# Patient Record
Sex: FEMALE | Race: WHITE | NOT HISPANIC OR LATINO | Employment: UNEMPLOYED | URBAN - METROPOLITAN AREA
[De-identification: names, ages, dates, MRNs, and addresses within clinical notes are randomized per-mention and may not be internally consistent; named-entity substitution may affect disease eponyms.]

---

## 2017-12-04 ENCOUNTER — HISTORICAL (OUTPATIENT)
Dept: LAB | Facility: HOSPITAL | Age: 57
End: 2017-12-04

## 2017-12-04 LAB
ABS NEUT (OLG): 3.54
ALBUMIN SERPL-MCNC: 4.3 GM/DL (ref 3.4–5)
ALBUMIN/GLOB SERPL: 1.2 RATIO (ref 1.1–2)
ALP SERPL-CCNC: 79 UNIT/L (ref 46–116)
ALT SERPL-CCNC: 231 UNIT/L (ref 12–78)
AST SERPL-CCNC: 152 UNIT/L (ref 10–37)
BASOPHILS # BLD AUTO: 0.03 X10(3)/MCL
BASOPHILS NFR BLD AUTO: 0.5 %
BILIRUB SERPL-MCNC: 1.3 MG/DL (ref 0.2–1)
BILIRUBIN DIRECT+TOT PNL SERPL-MCNC: 0.38 MG/DL (ref 0–0.2)
BILIRUBIN DIRECT+TOT PNL SERPL-MCNC: 0.9 MG/DL
BUN SERPL-MCNC: 5 MG/DL (ref 7–18)
CALCIUM SERPL-MCNC: 9.5 MG/DL (ref 8.5–10.1)
CHLORIDE SERPL-SCNC: 94 MMOL/L (ref 98–107)
CHOLEST SERPL-MCNC: 226 MG/DL (ref 50–200)
CHOLEST/HDLC SERPL: 1 {RATIO} (ref 0–5)
CO2 SERPL-SCNC: 30.1 MMOL/L (ref 21–32)
CREAT SERPL-MCNC: 0.62 MG/DL (ref 0.55–1.02)
EOSINOPHIL # BLD AUTO: 0.11 X10(3)/MCL
EOSINOPHIL NFR BLD AUTO: 1.8 %
ERYTHROCYTE [DISTWIDTH] IN BLOOD BY AUTOMATED COUNT: 12 %
GLOBULIN SER-MCNC: 3.5 GM/DL (ref 2.4–3.5)
GLUCOSE SERPL-MCNC: 91 MG/DL (ref 74–106)
HCT VFR BLD AUTO: 42.7 % (ref 34–46)
HDLC SERPL-MCNC: 174 MG/DL (ref 35–60)
HGB BLD-MCNC: 15 GM/DL (ref 11.3–15.4)
IMM GRANULOCYTES # BLD AUTO: 0.01 10*3/UL (ref 0–0.1)
IMM GRANULOCYTES NFR BLD AUTO: 0.2 % (ref 0–1)
LDLC SERPL CALC-MCNC: 41.8 MG/DL (ref 50–140)
LYMPHOCYTES # BLD AUTO: 1.59 X10(3)/MCL
LYMPHOCYTES NFR BLD AUTO: 26.1 %
MCH RBC QN AUTO: 32.1 PG (ref 27–33)
MCHC RBC AUTO-ENTMCNC: 35.1 GM/DL (ref 32–35)
MCV RBC AUTO: 91.4 FL (ref 81–97)
MONOCYTES # BLD AUTO: 0.82 X10(3)/MCL
MONOCYTES NFR BLD AUTO: 13.4 %
NEUTROPHILS # BLD AUTO: 3.54 X10(3)/MCL
NEUTROPHILS NFR BLD AUTO: 58 %
PLATELET # BLD AUTO: 252 X10(3)/MCL (ref 151–368)
PMV BLD AUTO: 8 FL
POTASSIUM SERPL-SCNC: 3 MMOL/L (ref 3.5–5.1)
PROT SERPL-MCNC: 7.8 GM/DL (ref 6.4–8.2)
RBC # BLD AUTO: 4.67 X10(6)/MCL (ref 3.9–5)
SODIUM SERPL-SCNC: 134 MMOL/L (ref 136–145)
TRIGL SERPL-MCNC: 51 MG/DL (ref 30–150)
VLDLC SERPL CALC-MCNC: 10 MG/DL
WBC # SPEC AUTO: 6.1 X10(3)/MCL (ref 3.4–9.2)

## 2017-12-18 ENCOUNTER — HISTORICAL (OUTPATIENT)
Dept: LAB | Facility: HOSPITAL | Age: 57
End: 2017-12-18

## 2017-12-18 LAB
ALBUMIN SERPL-MCNC: 4.1 GM/DL (ref 3.4–5)
ALP SERPL-CCNC: 66 UNIT/L (ref 46–116)
ALT SERPL-CCNC: 91 UNIT/L (ref 12–78)
AST SERPL-CCNC: 59 UNIT/L (ref 10–37)
BILIRUB SERPL-MCNC: 0.8 MG/DL (ref 0.2–1)
BILIRUBIN DIRECT+TOT PNL SERPL-MCNC: 0.25 MG/DL (ref 0–0.2)
BILIRUBIN DIRECT+TOT PNL SERPL-MCNC: 0.56 MG/DL
POTASSIUM SERPL-SCNC: 3.3 MMOL/L (ref 3.5–5.1)
PROT SERPL-MCNC: 7.4 GM/DL (ref 6.4–8.2)
SODIUM SERPL-SCNC: 136 MMOL/L (ref 136–145)

## 2017-12-20 ENCOUNTER — HISTORICAL (OUTPATIENT)
Dept: RADIOLOGY | Facility: HOSPITAL | Age: 57
End: 2017-12-20

## 2018-01-29 ENCOUNTER — HISTORICAL (OUTPATIENT)
Dept: RADIOLOGY | Facility: HOSPITAL | Age: 58
End: 2018-01-29

## 2018-08-20 ENCOUNTER — HISTORICAL (OUTPATIENT)
Dept: RESPIRATORY THERAPY | Facility: HOSPITAL | Age: 58
End: 2018-08-20

## 2018-08-24 ENCOUNTER — HISTORICAL (OUTPATIENT)
Dept: RADIOLOGY | Facility: HOSPITAL | Age: 58
End: 2018-08-24

## 2018-09-28 ENCOUNTER — HISTORICAL (OUTPATIENT)
Dept: RADIOLOGY | Facility: HOSPITAL | Age: 58
End: 2018-09-28

## 2018-09-28 LAB
BUN SERPL-MCNC: 6 MG/DL (ref 7–18)
CALCIUM SERPL-MCNC: 9.2 MG/DL (ref 8.5–10.1)
CHLORIDE SERPL-SCNC: 93 MMOL/L (ref 98–107)
CO2 SERPL-SCNC: 27.8 MMOL/L (ref 21–32)
CREAT SERPL-MCNC: 0.64 MG/DL (ref 0.55–1.02)
CREAT/UREA NIT SERPL: 9
GLUCOSE SERPL-MCNC: 93 MG/DL (ref 74–106)
POTASSIUM SERPL-SCNC: 3.8 MMOL/L (ref 3.5–5.1)
SODIUM SERPL-SCNC: 130 MMOL/L (ref 136–145)

## 2018-10-05 ENCOUNTER — HISTORICAL (OUTPATIENT)
Dept: RADIOLOGY | Facility: HOSPITAL | Age: 58
End: 2018-10-05

## 2019-10-02 ENCOUNTER — HISTORICAL (OUTPATIENT)
Dept: RADIOLOGY | Facility: HOSPITAL | Age: 59
End: 2019-10-02

## 2019-11-06 ENCOUNTER — HISTORICAL (OUTPATIENT)
Dept: RADIOLOGY | Facility: HOSPITAL | Age: 59
End: 2019-11-06

## 2019-11-07 ENCOUNTER — HISTORICAL (OUTPATIENT)
Dept: LAB | Facility: HOSPITAL | Age: 59
End: 2019-11-07

## 2019-11-07 LAB
ABS NEUT (OLG): 2.29
ALBUMIN SERPL-MCNC: 4 GM/DL (ref 3.4–5)
ALBUMIN/GLOB SERPL: 1.4 RATIO (ref 1.1–2)
ALP SERPL-CCNC: 65 UNIT/L (ref 46–116)
ALT SERPL-CCNC: 110 UNIT/L (ref 12–78)
AST SERPL-CCNC: 77 UNIT/L (ref 10–37)
BASOPHILS # BLD AUTO: 0.04 X10(3)/MCL
BASOPHILS NFR BLD AUTO: 0.8 %
BILIRUB SERPL-MCNC: 0.8 MG/DL (ref 0.2–1)
BILIRUBIN DIRECT+TOT PNL SERPL-MCNC: 0.27 MG/DL (ref 0–0.2)
BILIRUBIN DIRECT+TOT PNL SERPL-MCNC: 0.53 MG/DL
BUN SERPL-MCNC: 5 MG/DL (ref 7–18)
CALCIUM SERPL-MCNC: 9.4 MG/DL (ref 8.5–10.1)
CHLORIDE SERPL-SCNC: 95 MMOL/L (ref 98–107)
CHOLEST SERPL-MCNC: 193 MG/DL (ref 50–200)
CHOLEST/HDLC SERPL: 1 {RATIO} (ref 0–5)
CO2 SERPL-SCNC: 31.9 MMOL/L (ref 21–32)
CREAT SERPL-MCNC: 0.54 MG/DL (ref 0.55–1.02)
EOSINOPHIL # BLD AUTO: 0.26 X10(3)/MCL
EOSINOPHIL NFR BLD AUTO: 4.9 %
ERYTHROCYTE [DISTWIDTH] IN BLOOD BY AUTOMATED COUNT: 12 %
GLOBULIN SER-MCNC: 2.9 GM/DL (ref 2.4–3.5)
GLUCOSE SERPL-MCNC: 88 MG/DL (ref 74–106)
HCT VFR BLD AUTO: 41.7 % (ref 34–46)
HDLC SERPL-MCNC: 139 MG/DL (ref 35–60)
HGB BLD-MCNC: 14.4 GM/DL (ref 11.3–15.4)
IMM GRANULOCYTES # BLD AUTO: 0.01 10*3/UL (ref 0–0.1)
IMM GRANULOCYTES NFR BLD AUTO: 0.2 % (ref 0–1)
LDLC SERPL CALC-MCNC: 46 MG/DL (ref 50–140)
LYMPHOCYTES # BLD AUTO: 2.12 X10(3)/MCL
LYMPHOCYTES NFR BLD AUTO: 39.8 %
MCH RBC QN AUTO: 32 PG (ref 27–33)
MCHC RBC AUTO-ENTMCNC: 34.5 GM/DL (ref 32–35)
MCV RBC AUTO: 92.7 FL (ref 81–97)
MONOCYTES # BLD AUTO: 0.6 X10(3)/MCL
MONOCYTES NFR BLD AUTO: 11.3 %
NEUTROPHILS # BLD AUTO: 2.29 X10(3)/MCL
NEUTROPHILS NFR BLD AUTO: 43 %
PLATELET # BLD AUTO: 221 X10(3)/MCL (ref 140–450)
PMV BLD AUTO: 9 FL
POTASSIUM SERPL-SCNC: 3.3 MMOL/L (ref 3.5–5.1)
PROT SERPL-MCNC: 6.9 GM/DL (ref 6.4–8.2)
RBC # BLD AUTO: 4.5 X10(6)/MCL (ref 3.9–5)
SODIUM SERPL-SCNC: 135 MMOL/L (ref 136–145)
TRIGL SERPL-MCNC: 38 MG/DL (ref 30–150)
TSH SERPL-ACNC: 1.94 MIU/ML (ref 0.35–3.75)
VLDLC SERPL CALC-MCNC: 8 MG/DL
WBC # SPEC AUTO: 5.32 X10(3)/MCL (ref 3.4–9.2)

## 2020-02-12 ENCOUNTER — HISTORICAL (OUTPATIENT)
Dept: RADIOLOGY | Facility: HOSPITAL | Age: 60
End: 2020-02-12

## 2020-11-02 ENCOUNTER — HISTORICAL (OUTPATIENT)
Dept: RADIOLOGY | Facility: HOSPITAL | Age: 60
End: 2020-11-02

## 2020-11-04 ENCOUNTER — HISTORICAL (OUTPATIENT)
Dept: RADIOLOGY | Facility: HOSPITAL | Age: 60
End: 2020-11-04

## 2021-10-01 ENCOUNTER — HISTORICAL (OUTPATIENT)
Dept: LAB | Facility: HOSPITAL | Age: 61
End: 2021-10-01

## 2021-10-01 LAB
BILIRUB SERPL-MCNC: NEGATIVE MG/DL
BLOOD URINE, POC: NORMAL
CLARITY, POC UA: CLEAR
COLOR, POC UA: NORMAL
DIGOXIN SERPL-MCNC: <0.19 NG/ML (ref 0.8–2)
GLUCOSE UR QL STRIP: NEGATIVE
KETONES UR QL STRIP: NEGATIVE
LEUKOCYTE EST, POC UA: NORMAL
NITRITE, POC UA: NEGATIVE
PH, POC UA: 6
PROTEIN, POC: NEGATIVE
SPECIFIC GRAVITY, POC UA: 1.01
UROBILINOGEN, POC UA: NORMAL

## 2021-10-03 LAB — FINAL CULTURE: NO GROWTH

## 2021-10-06 ENCOUNTER — HISTORICAL (OUTPATIENT)
Dept: RADIOLOGY | Facility: HOSPITAL | Age: 61
End: 2021-10-06

## 2022-04-10 ENCOUNTER — HISTORICAL (OUTPATIENT)
Dept: ADMINISTRATIVE | Facility: HOSPITAL | Age: 62
End: 2022-04-10

## 2022-04-30 VITALS
BODY MASS INDEX: 19.54 KG/M2 | DIASTOLIC BLOOD PRESSURE: 78 MMHG | WEIGHT: 110.25 LBS | SYSTOLIC BLOOD PRESSURE: 118 MMHG | HEIGHT: 63 IN

## 2022-05-13 ENCOUNTER — LAB VISIT (OUTPATIENT)
Dept: LAB | Facility: HOSPITAL | Age: 62
End: 2022-05-13
Attending: FAMILY MEDICINE
Payer: MEDICAID

## 2022-05-13 DIAGNOSIS — Z00.00 WELLNESS EXAMINATION: ICD-10-CM

## 2022-05-13 DIAGNOSIS — Z13.6 SCREENING FOR CARDIOVASCULAR CONDITION: ICD-10-CM

## 2022-05-13 DIAGNOSIS — Z13.6 SCREENING FOR CARDIOVASCULAR CONDITION: Primary | ICD-10-CM

## 2022-05-13 LAB
ALBUMIN SERPL-MCNC: 4.2 GM/DL (ref 3.4–4.8)
ALBUMIN/GLOB SERPL: 1.5 RATIO (ref 1.1–2)
ALP SERPL-CCNC: 81 UNIT/L (ref 40–150)
ALT SERPL-CCNC: 36 UNIT/L (ref 0–55)
AST SERPL-CCNC: 45 UNIT/L (ref 5–34)
BASOPHILS # BLD AUTO: 0.04 X10(3)/MCL (ref 0–0.2)
BASOPHILS NFR BLD AUTO: 0.8 %
BILIRUBIN DIRECT+TOT PNL SERPL-MCNC: 0.4 MG/DL
BUN SERPL-MCNC: 6 MG/DL (ref 9.8–20.1)
CALCIUM SERPL-MCNC: 9.2 MG/DL (ref 8.4–10.2)
CHLORIDE SERPL-SCNC: 100 MMOL/L (ref 98–107)
CHOLEST SERPL-MCNC: 170 MG/DL
CHOLEST/HDLC SERPL: 2 {RATIO} (ref 0–5)
CO2 SERPL-SCNC: 30 MMOL/L (ref 23–31)
CREAT SERPL-MCNC: 0.62 MG/DL (ref 0.55–1.02)
EOSINOPHIL # BLD AUTO: 0.2 X10(3)/MCL (ref 0–0.9)
EOSINOPHIL NFR BLD AUTO: 3.9 %
ERYTHROCYTE [DISTWIDTH] IN BLOOD BY AUTOMATED COUNT: 12.4 % (ref 11.5–17)
GLOBULIN SER-MCNC: 2.8 GM/DL (ref 2.4–3.5)
GLUCOSE SERPL-MCNC: 94 MG/DL (ref 82–115)
HCT VFR BLD AUTO: 41.9 % (ref 37–47)
HDLC SERPL-MCNC: 104 MG/DL (ref 35–60)
HGB BLD-MCNC: 13.4 GM/DL (ref 12–16)
IMM GRANULOCYTES # BLD AUTO: 0 X10(3)/MCL (ref 0–0.02)
IMM GRANULOCYTES NFR BLD AUTO: 0 % (ref 0–0.43)
LDLC SERPL CALC-MCNC: 56 MG/DL (ref 50–140)
LYMPHOCYTES # BLD AUTO: 1.75 X10(3)/MCL (ref 0.6–4.6)
LYMPHOCYTES NFR BLD AUTO: 34.3 %
MCH RBC QN AUTO: 29.9 PG (ref 27–31)
MCHC RBC AUTO-ENTMCNC: 32 MG/DL (ref 33–36)
MCV RBC AUTO: 93.5 FL (ref 80–94)
MONOCYTES # BLD AUTO: 0.64 X10(3)/MCL (ref 0.1–1.3)
MONOCYTES NFR BLD AUTO: 12.5 %
NEUTROPHILS # BLD AUTO: 2.5 X10(3)/MCL (ref 2.1–9.2)
NEUTROPHILS NFR BLD AUTO: 48.5 %
NRBC BLD AUTO-RTO: 0 %
PLATELET # BLD AUTO: 244 X10(3)/MCL (ref 130–400)
PMV BLD AUTO: 9.3 FL (ref 9.4–12.4)
POTASSIUM SERPL-SCNC: 4.2 MMOL/L (ref 3.5–5.1)
PROT SERPL-MCNC: 7 GM/DL (ref 5.8–7.6)
RBC # BLD AUTO: 4.48 X10(6)/MCL (ref 4.2–5.4)
SODIUM SERPL-SCNC: 139 MMOL/L (ref 136–145)
TRIGL SERPL-MCNC: 51 MG/DL (ref 37–140)
VLDLC SERPL CALC-MCNC: 10 MG/DL
WBC # SPEC AUTO: 5.1 X10(3)/MCL (ref 4.5–11.5)

## 2022-05-13 PROCEDURE — 85025 COMPLETE CBC W/AUTO DIFF WBC: CPT

## 2022-05-13 PROCEDURE — 80061 LIPID PANEL: CPT

## 2022-05-13 PROCEDURE — 80053 COMPREHEN METABOLIC PANEL: CPT

## 2022-05-13 PROCEDURE — 36415 COLL VENOUS BLD VENIPUNCTURE: CPT

## 2022-05-17 ENCOUNTER — OFFICE VISIT (OUTPATIENT)
Dept: FAMILY MEDICINE | Facility: CLINIC | Age: 62
End: 2022-05-17
Payer: MEDICAID

## 2022-05-17 VITALS
BODY MASS INDEX: 19.54 KG/M2 | HEART RATE: 78 BPM | HEIGHT: 63 IN | OXYGEN SATURATION: 98 % | DIASTOLIC BLOOD PRESSURE: 78 MMHG | WEIGHT: 110.25 LBS | TEMPERATURE: 97 F | RESPIRATION RATE: 18 BRPM | SYSTOLIC BLOOD PRESSURE: 128 MMHG

## 2022-05-17 DIAGNOSIS — Z72.0 TOBACCO USER: ICD-10-CM

## 2022-05-17 DIAGNOSIS — Z00.00 WELLNESS EXAMINATION: Primary | ICD-10-CM

## 2022-05-17 PROBLEM — J44.9 CHRONIC OBSTRUCTIVE PULMONARY DISEASE: Status: ACTIVE | Noted: 2022-05-17

## 2022-05-17 PROBLEM — M81.0 OSTEOPOROSIS: Status: ACTIVE | Noted: 2022-05-17

## 2022-05-17 PROBLEM — I10 HYPERTENSION: Status: ACTIVE | Noted: 2022-05-17

## 2022-05-17 PROBLEM — K21.9 GASTROESOPHAGEAL REFLUX DISEASE: Status: ACTIVE | Noted: 2022-05-17

## 2022-05-17 PROBLEM — F41.9 ANXIETY: Status: ACTIVE | Noted: 2022-05-17

## 2022-05-17 PROBLEM — E78.5 HYPERLIPIDEMIA: Status: ACTIVE | Noted: 2022-05-17

## 2022-05-17 PROCEDURE — 1160F RVW MEDS BY RX/DR IN RCRD: CPT | Mod: CPTII,,, | Performed by: FAMILY MEDICINE

## 2022-05-17 PROCEDURE — 1159F MED LIST DOCD IN RCRD: CPT | Mod: CPTII,,, | Performed by: FAMILY MEDICINE

## 2022-05-17 PROCEDURE — 3008F BODY MASS INDEX DOCD: CPT | Mod: CPTII,,, | Performed by: FAMILY MEDICINE

## 2022-05-17 PROCEDURE — 3074F SYST BP LT 130 MM HG: CPT | Mod: CPTII,,, | Performed by: FAMILY MEDICINE

## 2022-05-17 PROCEDURE — 3078F DIAST BP <80 MM HG: CPT | Mod: CPTII,,, | Performed by: FAMILY MEDICINE

## 2022-05-17 PROCEDURE — 99396 PR PREVENTIVE VISIT,EST,40-64: ICD-10-PCS | Mod: ,,, | Performed by: FAMILY MEDICINE

## 2022-05-17 PROCEDURE — 1159F PR MEDICATION LIST DOCUMENTED IN MEDICAL RECORD: ICD-10-PCS | Mod: CPTII,,, | Performed by: FAMILY MEDICINE

## 2022-05-17 PROCEDURE — 4010F ACE/ARB THERAPY RXD/TAKEN: CPT | Mod: CPTII,,, | Performed by: FAMILY MEDICINE

## 2022-05-17 PROCEDURE — 3008F PR BODY MASS INDEX (BMI) DOCUMENTED: ICD-10-PCS | Mod: CPTII,,, | Performed by: FAMILY MEDICINE

## 2022-05-17 PROCEDURE — 99396 PREV VISIT EST AGE 40-64: CPT | Mod: ,,, | Performed by: FAMILY MEDICINE

## 2022-05-17 PROCEDURE — 4010F PR ACE/ARB THEARPY RXD/TAKEN: ICD-10-PCS | Mod: CPTII,,, | Performed by: FAMILY MEDICINE

## 2022-05-17 PROCEDURE — 3074F PR MOST RECENT SYSTOLIC BLOOD PRESSURE < 130 MM HG: ICD-10-PCS | Mod: CPTII,,, | Performed by: FAMILY MEDICINE

## 2022-05-17 PROCEDURE — 3078F PR MOST RECENT DIASTOLIC BLOOD PRESSURE < 80 MM HG: ICD-10-PCS | Mod: CPTII,,, | Performed by: FAMILY MEDICINE

## 2022-05-17 PROCEDURE — 1160F PR REVIEW ALL MEDS BY PRESCRIBER/CLIN PHARMACIST DOCUMENTED: ICD-10-PCS | Mod: CPTII,,, | Performed by: FAMILY MEDICINE

## 2022-05-17 RX ORDER — TRAZODONE HYDROCHLORIDE 50 MG/1
TABLET ORAL
COMMUNITY
Start: 2022-01-10 | End: 2022-09-06 | Stop reason: ALTCHOICE

## 2022-05-17 RX ORDER — ASPIRIN 81 MG/1
81 TABLET ORAL EVERY MORNING
Status: ON HOLD | COMMUNITY
End: 2024-01-25 | Stop reason: HOSPADM

## 2022-05-17 RX ORDER — ENALAPRIL MALEATE 5 MG/1
2.5 TABLET ORAL
COMMUNITY
Start: 2021-09-22 | End: 2022-07-28

## 2022-05-17 RX ORDER — HYDROCHLOROTHIAZIDE 25 MG/1
25 TABLET ORAL
Status: ON HOLD | COMMUNITY
End: 2023-07-16 | Stop reason: HOSPADM

## 2022-05-17 RX ORDER — FLUTICASONE PROPIONATE AND SALMETEROL 250; 50 UG/1; UG/1
1 POWDER RESPIRATORY (INHALATION)
Status: ON HOLD | COMMUNITY
End: 2023-07-14

## 2022-05-17 RX ORDER — DIGOXIN 125 MCG
TABLET ORAL
COMMUNITY
Start: 2021-12-29 | End: 2023-06-08

## 2022-05-17 RX ORDER — SIMVASTATIN 20 MG/1
TABLET, FILM COATED ORAL
COMMUNITY
Start: 2021-10-21 | End: 2022-07-13

## 2022-05-17 RX ORDER — IPRATROPIUM BROMIDE AND ALBUTEROL SULFATE 2.5; .5 MG/3ML; MG/3ML
3 SOLUTION RESPIRATORY (INHALATION) EVERY 6 HOURS PRN
COMMUNITY
Start: 2021-11-15 | End: 2023-08-31 | Stop reason: SDUPTHER

## 2022-05-17 RX ORDER — POTASSIUM CHLORIDE 750 MG/1
TABLET, EXTENDED RELEASE ORAL
COMMUNITY
Start: 2021-12-10 | End: 2022-08-12

## 2022-05-17 NOTE — PROGRESS NOTES
"Subjective:       Patient ID: Antonina Jaimes is a 61 y.o. female.    Chief Complaint: wellness      Wellness    Hypertension  - tolerating medication  - no headaches  - patient without any complaints    Hyperlipidemia  - tolerating medication  - no myalgia  - watching diet    Review of Systems   Constitutional: Negative.    HENT: Negative.    Respiratory: Negative.    Cardiovascular: Negative.    Gastrointestinal: Negative.    Genitourinary: Negative.    Musculoskeletal: Negative.    Integumentary:  Negative.   Neurological: Negative.    Hematological: Negative.    Psychiatric/Behavioral: Negative.            Objective:      /78 (BP Location: Left arm, Patient Position: Sitting, BP Method: Large (Manual))   Pulse 78   Temp 97.2 °F (36.2 °C)   Resp 18   Ht 5' 3" (1.6 m)   Wt 50 kg (110 lb 3.7 oz)   SpO2 98%   BMI 19.53 kg/m²    Physical Exam  Constitutional:       General: She is not in acute distress.     Appearance: Normal appearance.   Cardiovascular:      Rate and Rhythm: Normal rate and regular rhythm.   Pulmonary:      Effort: Pulmonary effort is normal.      Breath sounds: Normal breath sounds.   Abdominal:      General: Abdomen is flat. Bowel sounds are normal.      Palpations: Abdomen is soft.   Musculoskeletal:         General: Normal range of motion.   Neurological:      General: No focal deficit present.      Mental Status: She is alert and oriented to person, place, and time.   Psychiatric:         Mood and Affect: Mood normal.         Behavior: Behavior normal.         Thought Content: Thought content normal.         Judgment: Judgment normal.           Recent Results (from the past 504 hour(s))   Comprehensive Metabolic Panel    Collection Time: 05/13/22  9:58 AM   Result Value Ref Range    Sodium Level 139 136 - 145 mmol/L    Potassium Level 4.2 3.5 - 5.1 mmol/L    Chloride 100 98 - 107 mmol/L    Carbon Dioxide 30 23 - 31 mmol/L    Glucose Level 94 82 - 115 mg/dL    Blood Urea Nitrogen " 6.0 (L) 9.8 - 20.1 mg/dL    Creatinine 0.62 0.55 - 1.02 mg/dL    Calcium Level Total 9.2 8.4 - 10.2 mg/dL    Protein Total 7.0 5.8 - 7.6 gm/dL    Albumin Level 4.2 3.4 - 4.8 gm/dL    Globulin 2.8 2.4 - 3.5 gm/dL    Albumin/Globulin Ratio 1.5 1.1 - 2.0 ratio    Bilirubin Total 0.4 <=1.5 mg/dL    Alkaline Phosphatase 81 40 - 150 unit/L    Alanine Aminotransferase 36 0 - 55 unit/L    Aspartate Aminotransferase 45 (H) 5 - 34 unit/L   Lipid Panel    Collection Time: 05/13/22  9:58 AM   Result Value Ref Range    Cholesterol Total 170 <=200 mg/dL    HDL Cholesterol 104 (H) 35 - 60 mg/dL    Triglyceride 51 37 - 140 mg/dL    Cholesterol/HDL Ratio 2 0 - 5    Very Low Density Lipoprotein 10     LDL Cholesterol 56.00 50.00 - 140.00 mg/dL   CBC with Differential    Collection Time: 05/13/22  9:58 AM   Result Value Ref Range    WBC 5.1 4.5 - 11.5 x10(3)/mcL    RBC 4.48 4.20 - 5.40 x10(6)/mcL    Hgb 13.4 12.0 - 16.0 gm/dL    Hct 41.9 37.0 - 47.0 %    MCV 93.5 80.0 - 94.0 fL    MCH 29.9 27.0 - 31.0 pg    MCHC 32.0 (L) 33.0 - 36.0 mg/dL    RDW 12.4 11.5 - 17.0 %    Platelet 244 130 - 400 x10(3)/mcL    MPV 9.3 (L) 9.4 - 12.4 fL    Neut % 48.5 %    Lymph % 34.3 %    Mono Auto 12.5 %    Eos Auto 3.9 %    Basophil Auto 0.8 %    Lymph # 1.75 0.6 - 4.6 x10(3)/mcL    Neut # 2.5 2.1 - 9.2 x10(3)/mcL    Abs Mono 0.64 0.1 - 1.3 x10(3)/mcL    Abs Eos 0.20 0 - 0.9 x10(3)/mcL    Abs Baso 0.04 0 - 0.2 x10(3)/mcL    IG# 0.00 0 - 0.0155 x10(3)/mcL    IG% 0.0 0 - 0.43 %    NRBC% 0.0 %       Assessment:       Problem List Items Addressed This Visit        Other    Tobacco user    Relevant Orders    Ambulatory referral/consult to Smoking Cessation Program      Other Visit Diagnoses     Wellness examination    -  Primary           Plan:     1. Wellness  Lab work discussed with patient  Continue current medication  Continue diet/exercise  Patient defers mammogram/colon cancer screening  Refer for tobacco cessation program  Return to clinic with any  concerns

## 2022-09-06 ENCOUNTER — HOSPITAL ENCOUNTER (EMERGENCY)
Facility: HOSPITAL | Age: 62
Discharge: HOME | End: 2022-09-06
Attending: EMERGENCY MEDICINE
Payer: MEDICAID

## 2022-09-06 VITALS
DIASTOLIC BLOOD PRESSURE: 91 MMHG | BODY MASS INDEX: 19.84 KG/M2 | HEART RATE: 92 BPM | HEIGHT: 63 IN | WEIGHT: 112 LBS | OXYGEN SATURATION: 97 % | RESPIRATION RATE: 16 BRPM | SYSTOLIC BLOOD PRESSURE: 169 MMHG | TEMPERATURE: 99 F

## 2022-09-06 DIAGNOSIS — M54.9 BACK PAIN: ICD-10-CM

## 2022-09-06 DIAGNOSIS — R07.9 CHEST PAIN: Primary | ICD-10-CM

## 2022-09-06 DIAGNOSIS — S22.000A COMPRESSION FRACTURE OF THORACIC VERTEBRA, UNSPECIFIED THORACIC VERTEBRAL LEVEL, INITIAL ENCOUNTER: ICD-10-CM

## 2022-09-06 DIAGNOSIS — G89.29 CHRONIC RIGHT-SIDED THORACIC BACK PAIN: ICD-10-CM

## 2022-09-06 DIAGNOSIS — M54.6 CHRONIC RIGHT-SIDED THORACIC BACK PAIN: ICD-10-CM

## 2022-09-06 DIAGNOSIS — R07.89 RIGHT-SIDED CHEST WALL PAIN: ICD-10-CM

## 2022-09-06 PROCEDURE — 99284 EMERGENCY DEPT VISIT MOD MDM: CPT | Mod: 25

## 2022-09-06 PROCEDURE — 96372 THER/PROPH/DIAG INJ SC/IM: CPT | Performed by: EMERGENCY MEDICINE

## 2022-09-06 PROCEDURE — 63600175 PHARM REV CODE 636 W HCPCS: Performed by: EMERGENCY MEDICINE

## 2022-09-06 RX ORDER — HYDROCODONE BITARTRATE AND ACETAMINOPHEN 5; 325 MG/1; MG/1
1 TABLET ORAL EVERY 6 HOURS PRN
Qty: 15 TABLET | Refills: 0 | Status: SHIPPED | OUTPATIENT
Start: 2022-09-06 | End: 2022-09-19

## 2022-09-06 RX ORDER — CYCLOBENZAPRINE HCL 10 MG
10 TABLET ORAL 3 TIMES DAILY PRN
Qty: 15 TABLET | Refills: 0 | Status: SHIPPED | OUTPATIENT
Start: 2022-09-06 | End: 2022-09-11

## 2022-09-06 RX ORDER — KETOROLAC TROMETHAMINE 30 MG/ML
30 INJECTION, SOLUTION INTRAMUSCULAR; INTRAVENOUS
Status: COMPLETED | OUTPATIENT
Start: 2022-09-06 | End: 2022-09-06

## 2022-09-06 RX ADMIN — KETOROLAC TROMETHAMINE 30 MG: 30 INJECTION, SOLUTION INTRAMUSCULAR; INTRAVENOUS at 04:09

## 2022-09-06 NOTE — ED PROVIDER NOTES
Encounter Date: 9/6/2022       History     Chief Complaint   Patient presents with    Back Pain    Rib Injury     Rib injury on left weeks ago with pain increasing and moving around middle back and bilateral lower ribs     Patient is a 61-year-old female presenting with complain chest wall pain to the right lateral chest area and to the thoracic back area on the right.  Patient denies any trauma or falls.  Patient does states she has a chronic cough from smoking.  Patient denies fever or chills.  No purulent sputum.  Patient denies shortness of breath.  She complains of increased pain with movement of her torso.  Patient states she had something similar a couple months ago on the the left lateral chest wall.  Patient also states she has had episodes of thoracic back pain in the past.  Patient denies any focal weakness of the extremities.  No paresthesias.    Review of patient's allergies indicates:  No Known Allergies  Past Medical History:   Diagnosis Date    COPD (chronic obstructive pulmonary disease)     GERD (gastroesophageal reflux disease)     Hyperlipidemia     Hypertension     Osteoporosis      Past Surgical History:   Procedure Laterality Date    ADENOIDECTOMY      HYSTERECTOMY      TONSILLECTOMY       History reviewed. No pertinent family history.  Social History     Tobacco Use    Smoking status: Every Day     Packs/day: 1.00     Types: Cigarettes    Smokeless tobacco: Never   Substance Use Topics    Alcohol use: Yes     Alcohol/week: 2.0 standard drinks     Types: 2 Cans of beer per week     Review of Systems   Constitutional: Negative.    Respiratory:  Positive for cough. Negative for shortness of breath and wheezing.    Cardiovascular:  Positive for chest pain. Negative for palpitations and leg swelling.   Gastrointestinal: Negative.    Genitourinary: Negative.    Musculoskeletal:  Positive for back pain and myalgias. Negative for gait problem.   Neurological: Negative.    Psychiatric/Behavioral:  Negative.       Physical Exam     Initial Vitals [09/06/22 1557]   BP Pulse Resp Temp SpO2   (!) 173/100 96 20 98.6 °F (37 °C) 97 %      MAP       --         Physical Exam    Nursing note and vitals reviewed.  Constitutional: She appears well-developed and well-nourished.   HENT:   Head: Normocephalic and atraumatic.   Neck: Neck supple.   Normal range of motion.  Cardiovascular:  Normal rate, regular rhythm and normal heart sounds.           Pulmonary/Chest: Breath sounds normal. No respiratory distress. She has no wheezes. She has no rhonchi. She has no rales. She exhibits tenderness.   Patient has reproducible chest tenderness to palpation to the right lateral chest wall and to the right thoracic back area.  No rash seen.  Patient has good bilateral breath sounds.   Abdominal: Abdomen is soft. There is no abdominal tenderness. There is no guarding.   Musculoskeletal:         General: Normal range of motion.      Cervical back: Normal range of motion and neck supple.      Comments: No tenderness to palpation over the thoracic spine.  No deformities or step-offs.  No rashes.     Neurological: She is alert and oriented to person, place, and time. She has normal strength.   Psychiatric: She has a normal mood and affect. Thought content normal.       ED Course   Procedures  Labs Reviewed - No data to display       Imaging Results              X-Ray Thoracic Spine AP And Lateral (Final result)  Result time 09/06/22 16:47:50      Final result by Sabrina Cuevas MD (09/06/22 16:47:50)                   Impression:      1. Multiple age-indeterminate compression deformities with mild loss of height.  2. Multilevel degenerative changes of the thoracic spine.      Electronically signed by: Sabrina Cuevas  Date:    09/06/2022  Time:    16:47               Narrative:    EXAMINATION:  XR THORACIC SPINE AP LATERAL    CLINICAL HISTORY:  Dorsalgia, unspecified    COMPARISON:  Chest x-ray dated  12/19/2019    FINDINGS:  Thoracic alignment is preserved.  There are multiple age indeterminate compression deformities in the thoracic spine with mild loss of height.  There are mild multilevel degenerative change with disc height loss and marginal osteophyte formation.  The soft tissues are unremarkable.                                       X-Ray Chest PA And Lateral (Final result)  Result time 09/06/22 16:47:46      Final result by Tyree Santillan MD (09/06/22 16:47:46)                   Impression:      No acute findings.      Electronically signed by: Tyree Santillan  Date:    09/06/2022  Time:    16:47               Narrative:    EXAMINATION:  XR CHEST PA AND LATERAL    CLINICAL HISTORY:  Chest pain, unspecified    COMPARISON:  6 November 2019    FINDINGS:  PA and lateral views of the chest were obtained. Heart is not significantly enlarged.  There is a 9 mm nodule medial right lung base which appears stable going back to 2019.  No new focal consolidation.  No pneumothorax.                                       Medications   ketorolac injection 30 mg (30 mg Intramuscular Given 9/6/22 1625)                          Clinical Impression:   Final diagnoses:  [R07.9] Chest pain (Primary)  [M54.9] Back pain  [R07.89] Right-sided chest wall pain  [M54.6, G89.29] Chronic right-sided thoracic back pain  [S22.000A] Compression fracture of thoracic vertebra, unspecified thoracic vertebral level, initial encounter      ED Disposition Condition    Discharge Stable          ED Prescriptions       Medication Sig Dispense Start Date End Date Auth. Provider    HYDROcodone-acetaminophen (NORCO) 5-325 mg per tablet Take 1 tablet by mouth every 6 (six) hours as needed for Pain. 15 tablet 9/6/2022 -- Williams Díaz MD    cyclobenzaprine (FLEXERIL) 10 MG tablet Take 1 tablet (10 mg total) by mouth 3 (three) times daily as needed for Muscle spasms. 15 tablet 9/6/2022 9/11/2022 Williams Díaz MD          Follow-up Information     None          Williams Díaz MD  09/06/22 9160

## 2022-09-19 ENCOUNTER — OFFICE VISIT (OUTPATIENT)
Dept: FAMILY MEDICINE | Facility: CLINIC | Age: 62
End: 2022-09-19
Payer: MEDICAID

## 2022-09-19 VITALS
DIASTOLIC BLOOD PRESSURE: 74 MMHG | HEART RATE: 97 BPM | OXYGEN SATURATION: 97 % | BODY MASS INDEX: 19.67 KG/M2 | HEIGHT: 63 IN | TEMPERATURE: 97 F | RESPIRATION RATE: 18 BRPM | SYSTOLIC BLOOD PRESSURE: 110 MMHG | WEIGHT: 111 LBS

## 2022-09-19 DIAGNOSIS — S22.000A COMPRESSION FRACTURE OF THORACIC VERTEBRA, UNSPECIFIED THORACIC VERTEBRAL LEVEL, INITIAL ENCOUNTER: Primary | ICD-10-CM

## 2022-09-19 PROCEDURE — 4010F PR ACE/ARB THEARPY RXD/TAKEN: ICD-10-PCS | Mod: CPTII,,, | Performed by: FAMILY MEDICINE

## 2022-09-19 PROCEDURE — 3008F PR BODY MASS INDEX (BMI) DOCUMENTED: ICD-10-PCS | Mod: CPTII,,, | Performed by: FAMILY MEDICINE

## 2022-09-19 PROCEDURE — 99214 PR OFFICE/OUTPT VISIT, EST, LEVL IV, 30-39 MIN: ICD-10-PCS | Mod: ,,, | Performed by: FAMILY MEDICINE

## 2022-09-19 PROCEDURE — 3074F SYST BP LT 130 MM HG: CPT | Mod: CPTII,,, | Performed by: FAMILY MEDICINE

## 2022-09-19 PROCEDURE — 1160F PR REVIEW ALL MEDS BY PRESCRIBER/CLIN PHARMACIST DOCUMENTED: ICD-10-PCS | Mod: CPTII,,, | Performed by: FAMILY MEDICINE

## 2022-09-19 PROCEDURE — 3074F PR MOST RECENT SYSTOLIC BLOOD PRESSURE < 130 MM HG: ICD-10-PCS | Mod: CPTII,,, | Performed by: FAMILY MEDICINE

## 2022-09-19 PROCEDURE — 99214 OFFICE O/P EST MOD 30 MIN: CPT | Mod: ,,, | Performed by: FAMILY MEDICINE

## 2022-09-19 PROCEDURE — 1160F RVW MEDS BY RX/DR IN RCRD: CPT | Mod: CPTII,,, | Performed by: FAMILY MEDICINE

## 2022-09-19 PROCEDURE — 3078F PR MOST RECENT DIASTOLIC BLOOD PRESSURE < 80 MM HG: ICD-10-PCS | Mod: CPTII,,, | Performed by: FAMILY MEDICINE

## 2022-09-19 PROCEDURE — 3008F BODY MASS INDEX DOCD: CPT | Mod: CPTII,,, | Performed by: FAMILY MEDICINE

## 2022-09-19 PROCEDURE — 3078F DIAST BP <80 MM HG: CPT | Mod: CPTII,,, | Performed by: FAMILY MEDICINE

## 2022-09-19 PROCEDURE — 1159F PR MEDICATION LIST DOCUMENTED IN MEDICAL RECORD: ICD-10-PCS | Mod: CPTII,,, | Performed by: FAMILY MEDICINE

## 2022-09-19 PROCEDURE — 4010F ACE/ARB THERAPY RXD/TAKEN: CPT | Mod: CPTII,,, | Performed by: FAMILY MEDICINE

## 2022-09-19 PROCEDURE — 1159F MED LIST DOCD IN RCRD: CPT | Mod: CPTII,,, | Performed by: FAMILY MEDICINE

## 2022-09-19 RX ORDER — NAPROXEN 500 MG/1
500 TABLET ORAL 2 TIMES DAILY
Qty: 30 TABLET | Refills: 0 | Status: SHIPPED | OUTPATIENT
Start: 2022-09-19 | End: 2022-10-06

## 2022-09-19 RX ORDER — CYCLOBENZAPRINE HCL 5 MG
5 TABLET ORAL 3 TIMES DAILY PRN
Qty: 30 TABLET | Refills: 0 | Status: SHIPPED | OUTPATIENT
Start: 2022-09-19 | End: 2022-09-29

## 2022-09-19 NOTE — PROGRESS NOTES
"Subjective:       Patient ID: Antonina Jaimes is a 61 y.o. female.    Chief Complaint: 4 month follow up      Back pain      Review of Systems   Constitutional: Negative.    Respiratory: Negative.     Cardiovascular: Negative.    Musculoskeletal:         Thoracic back pain: seen in ER on 9/8/2022, no recent trauma, unable to lay flat due to pain         Objective:      /74 (BP Location: Left arm, Patient Position: Sitting, BP Method: Large (Manual))   Pulse 97   Temp 97.4 °F (36.3 °C)   Resp 18   Ht 5' 3" (1.6 m)   Wt 50.3 kg (111 lb)   SpO2 97%   BMI 19.66 kg/m²    Physical Exam  Constitutional:       Appearance: Normal appearance.   Cardiovascular:      Rate and Rhythm: Normal rate and regular rhythm.      Heart sounds: Normal heart sounds.   Pulmonary:      Effort: Pulmonary effort is normal.      Breath sounds: Normal breath sounds.   Musculoskeletal:      Comments: TTP on midline thoracic spine   Neurological:      Mental Status: She is alert.   Psychiatric:         Mood and Affect: Mood normal.         Behavior: Behavior normal.         Thought Content: Thought content normal.         Judgment: Judgment normal.           Assessment:       Problem List Items Addressed This Visit    None  Visit Diagnoses       Compression fracture of thoracic vertebra, unspecified thoracic vertebral level, initial encounter    -  Primary    Relevant Medications    cyclobenzaprine (FLEXERIL) 5 MG tablet    naproxen (NAPROSYN) 500 MG tablet    Other Relevant Orders    Ambulatory referral/consult to Neurosurgery               Plan:   1. Compression fracture of thoracic vertebra, unspecified thoracic vertebral level, initial encounter  -     Ambulatory referral/consult to Neurosurgery  -     cyclobenzaprine (FLEXERIL) 5 MG tablet  -     naproxen (NAPROSYN) 500 MG tablet  X-ray results reviewed patient  Use above medication   Monitor  Return to clinic with any concerns      "

## 2022-09-21 ENCOUNTER — HISTORICAL (OUTPATIENT)
Dept: ADMINISTRATIVE | Facility: HOSPITAL | Age: 62
End: 2022-09-21
Payer: MEDICAID

## 2023-02-27 ENCOUNTER — HOSPITAL ENCOUNTER (EMERGENCY)
Facility: HOSPITAL | Age: 63
Discharge: HOME | End: 2023-02-27
Attending: STUDENT IN AN ORGANIZED HEALTH CARE EDUCATION/TRAINING PROGRAM
Payer: MEDICAID

## 2023-02-27 VITALS
TEMPERATURE: 98 F | DIASTOLIC BLOOD PRESSURE: 79 MMHG | RESPIRATION RATE: 18 BRPM | OXYGEN SATURATION: 99 % | HEART RATE: 78 BPM | SYSTOLIC BLOOD PRESSURE: 140 MMHG

## 2023-02-27 DIAGNOSIS — S93.402A SPRAIN OF LEFT ANKLE, UNSPECIFIED LIGAMENT, INITIAL ENCOUNTER: Primary | ICD-10-CM
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
## 2023-02-27 DIAGNOSIS — S83.92XA SPRAIN OF LEFT KNEE, UNSPECIFIED LIGAMENT, INITIAL ENCOUNTER: ICD-10-CM

## 2023-02-27 PROCEDURE — 99284 EMERGENCY DEPT VISIT MOD MDM: CPT | Mod: 25

## 2023-02-27 PROCEDURE — 96372 THER/PROPH/DIAG INJ SC/IM: CPT | Performed by: STUDENT IN AN ORGANIZED HEALTH CARE EDUCATION/TRAINING PROGRAM

## 2023-02-27 PROCEDURE — 63600175 PHARM REV CODE 636 W HCPCS: Performed by: STUDENT IN AN ORGANIZED HEALTH CARE EDUCATION/TRAINING PROGRAM

## 2023-02-27 RX ORDER — KETOROLAC TROMETHAMINE 30 MG/ML
30 INJECTION, SOLUTION INTRAMUSCULAR; INTRAVENOUS
Status: COMPLETED | OUTPATIENT
Start: 2023-02-27 | End: 2023-02-27

## 2023-02-27 RX ADMIN — KETOROLAC TROMETHAMINE 30 MG: 30 INJECTION, SOLUTION INTRAMUSCULAR; INTRAVENOUS at 09:02

## 2023-02-28 NOTE — ED NOTES
Wrapped left ankle and left knee, +cms before and after. Checked by Dr Sorto. Fit for crutches, crutch teaching and return demo. Pt high risk for falls. Explained to daughter and she agrees. Will assist pt at home.

## 2023-02-28 NOTE — ED PROVIDER NOTES
Encounter Date: 2/27/2023       History     Chief Complaint   Patient presents with    Fall    Ankle Pain    Knee Injury     Slipped and fell on floor ;twisted left ankle and fell on right knee. Right ankle swollen with abrasion, right knee not swllen; has small abrasion . No bleeding     63yo WF past medical history COPD, Hypertension, hyperlipidemia presented to ED after slip and fall. Patient states slipped on wet floor today around 2 pm. States twisted her left ankle and fell to the ground hitting her knee. She denies hitting her head, loss of consciousness.    Review of patient's allergies indicates:  Not on File  Past Medical History:   Diagnosis Date    COPD (chronic obstructive pulmonary disease)     GERD (gastroesophageal reflux disease)     Hyperlipidemia     Hypertension     Osteoporosis      Past Surgical History:   Procedure Laterality Date    ADENOIDECTOMY      HYSTERECTOMY      TONSILLECTOMY       No family history on file.  Social History     Tobacco Use    Smoking status: Every Day     Packs/day: 1.00     Types: Cigarettes    Smokeless tobacco: Never   Substance Use Topics    Alcohol use: Yes     Alcohol/week: 2.0 standard drinks     Types: 2 Cans of beer per week     Review of Systems   Constitutional:  Negative for chills and fever.   HENT:  Negative for congestion.    Eyes: Negative.    Respiratory:  Negative for shortness of breath.    Cardiovascular:  Negative for chest pain.   Gastrointestinal:  Negative for abdominal pain.   Genitourinary:  Negative for dysuria.   Musculoskeletal:         Left ankle and knee pain   Skin:  Positive for wound.   Neurological:  Negative for weakness and headaches.     Physical Exam     Initial Vitals [02/27/23 2025]   BP Pulse Resp Temp SpO2   (!) 165/89 89 16 98 °F (36.7 °C) --      MAP       --         Physical Exam    Vitals reviewed.  Constitutional: She appears well-developed and well-nourished.   HENT:   Head: Normocephalic and atraumatic.   Nose: Nose  normal.   Eyes: Conjunctivae are normal.   Neck: Neck supple.   Cardiovascular:  Normal rate and regular rhythm.           Pulmonary/Chest: Breath sounds normal. No respiratory distress.   Abdominal: Abdomen is soft. Bowel sounds are normal.   Musculoskeletal:      Cervical back: Neck supple.      Right knee: Normal.      Left knee: No swelling, deformity, erythema or crepitus. Tenderness present over the medial joint line. No ACL laxity or PCL laxity.Normal pulse.      Instability Tests: Anterior drawer test negative. Posterior drawer test negative. Anterior Lachman test negative.      Right ankle: Normal.      Left ankle: Swelling present. No deformity. Tenderness present. Decreased range of motion. Anterior drawer test negative.     Neurological: She is alert and oriented to person, place, and time.   Skin: Skin is warm and dry. Capillary refill takes less than 2 seconds.   Abrasion left dorsal foot, left shin   Psychiatric: She has a normal mood and affect. Her behavior is normal. Judgment and thought content normal.       ED Course   Procedures  Labs Reviewed - No data to display       Imaging Results              X-Ray Knee 3 View Left (Final result)  Result time 02/27/23 21:02:49      Final result by Jomar Sanabria MD (02/27/23 21:02:49)                   Impression:      No displaced fracture appreciated.      Electronically signed by: Jomar Sanabria  Date:    02/27/2023  Time:    21:02               Narrative:    EXAMINATION:  XR KNEE 3 VIEW LEFT    CLINICAL HISTORY:  fall;    TECHNIQUE:  AP, lateral, and Merchant views of the left knee were performed.    COMPARISON:  None    FINDINGS:  No displaced fracture.  No gross soft tissue abnormality.                                       X-Ray Ankle Complete Left (Final result)  Result time 02/27/23 21:03:14      Final result by Jomar Sanabria MD (02/27/23 21:03:14)                   Impression:      No acute osseous abnormality, fracture, or  dislocation.    Mild to moderate degenerative changes.      Electronically signed by: Jomar Sanabria  Date:    02/27/2023  Time:    21:03               Narrative:    EXAMINATION:  XR ANKLE COMPLETE 3 VIEW LEFT    CLINICAL HISTORY:  Unspecified fall, initial encounter    TECHNIQUE:  Radiographs of the left ankle with AP, lateral and oblique  views.    COMPARISON:  No prior imaging available for comparison    FINDINGS:  There is no acute fracture, subluxation or dislocation.    Degenerative changes with malleolar osteophytes.    Osseous structures show normal bone mineral density.    Soft tissues are unremarkable.    There are no radiopaque foreign bodies.                                       Medications   ketorolac injection 30 mg (30 mg Intramuscular Given 2/27/23 2126)     Medical Decision Making:   Initial Assessment:   61yo with slip and fall  Differential Diagnosis:   Ankle sprain, fracture, knee sprain  Clinical Tests:   Radiological Study: Ordered and Reviewed  ED Management:  Toradol given.  Discussed imaging results  Ace wrap placed.  Advised RICE therapy.                          Clinical Impression:   Final diagnoses:  [W19.XXXA] Fall  [S93.402A] Sprain of left ankle, unspecified ligament, initial encounter (Primary)  [S83.92XA] Sprain of left knee, unspecified ligament, initial encounter        ED Disposition Condition    Discharge Stable          ED Prescriptions    None       Follow-up Information       Follow up With Specialties Details Why Contact Info    [facility] Emergency Medicine  If symptoms worsen, As needed [address] [place] [id number] [phone]    PCP  Schedule an appointment as soon as possible for a visit in 1 week               Carlotta Sorto DO  02/27/23 5610

## 2023-05-17 DIAGNOSIS — Z00.00 WELLNESS EXAMINATION: Primary | ICD-10-CM

## 2023-05-17 DIAGNOSIS — Z11.4 ENCOUNTER FOR SCREENING FOR HIV: ICD-10-CM

## 2023-05-17 DIAGNOSIS — Z11.59 NEED FOR HEPATITIS C SCREENING TEST: ICD-10-CM

## 2023-05-17 DIAGNOSIS — Z13.6 SCREENING FOR ISCHEMIC HEART DISEASE: ICD-10-CM

## 2023-05-18 ENCOUNTER — HOSPITAL ENCOUNTER (EMERGENCY)
Facility: HOSPITAL | Age: 63
Discharge: HOME | End: 2023-05-18
Attending: STUDENT IN AN ORGANIZED HEALTH CARE EDUCATION/TRAINING PROGRAM
Payer: MEDICAID

## 2023-05-18 VITALS
OXYGEN SATURATION: 98 % | HEIGHT: 63 IN | BODY MASS INDEX: 23.74 KG/M2 | SYSTOLIC BLOOD PRESSURE: 136 MMHG | HEART RATE: 80 BPM | TEMPERATURE: 98 F | WEIGHT: 134 LBS | DIASTOLIC BLOOD PRESSURE: 70 MMHG | RESPIRATION RATE: 20 BRPM

## 2023-05-18 DIAGNOSIS — S00.03XA CONTUSION OF SCALP, INITIAL ENCOUNTER: Primary | ICD-10-CM
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
## 2023-05-18 DIAGNOSIS — F10.920 ALCOHOLIC INTOXICATION WITHOUT COMPLICATION: ICD-10-CM

## 2023-05-18 LAB
ALBUMIN SERPL-MCNC: 4.2 G/DL (ref 3.4–4.8)
ALBUMIN/GLOB SERPL: 1.5 RATIO (ref 1.1–2)
ALP SERPL-CCNC: 75 UNIT/L (ref 40–150)
ALT SERPL-CCNC: 17 UNIT/L (ref 0–55)
AMPHET UR QL SCN: NEGATIVE
APPEARANCE UR: CLEAR
AST SERPL-CCNC: 24 UNIT/L (ref 5–34)
BACTERIA #/AREA URNS AUTO: NORMAL /HPF
BARBITURATE SCN PRESENT UR: NEGATIVE
BASOPHILS # BLD AUTO: 0.05 X10(3)/MCL
BASOPHILS NFR BLD AUTO: 0.7 %
BENZODIAZ UR QL SCN: NEGATIVE
BILIRUB UR QL STRIP.AUTO: NEGATIVE MG/DL
BILIRUBIN DIRECT+TOT PNL SERPL-MCNC: 0.3 MG/DL
BUN SERPL-MCNC: 4 MG/DL (ref 9.8–20.1)
CALCIUM SERPL-MCNC: 9.3 MG/DL (ref 8.4–10.2)
CANNABINOIDS UR QL SCN: NEGATIVE
CHLORIDE SERPL-SCNC: 95 MMOL/L (ref 98–107)
CO2 SERPL-SCNC: 30 MMOL/L (ref 23–31)
COCAINE UR QL SCN: NEGATIVE
COLOR UR: YELLOW
CREAT SERPL-MCNC: 0.64 MG/DL (ref 0.55–1.02)
EOSINOPHIL # BLD AUTO: 0.23 X10(3)/MCL (ref 0–0.9)
EOSINOPHIL NFR BLD AUTO: 3.1 %
ERYTHROCYTE [DISTWIDTH] IN BLOOD BY AUTOMATED COUNT: 11.8 % (ref 11.5–17)
ETHANOL SERPL-MCNC: 319.4 MG/DL
FENTANYL UR QL SCN: NEGATIVE
GFR SERPLBLD CREATININE-BSD FMLA CKD-EPI: >60 MLS/MIN/1.73/M2
GLOBULIN SER-MCNC: 2.8 GM/DL (ref 2.4–3.5)
GLUCOSE SERPL-MCNC: 93 MG/DL (ref 82–115)
GLUCOSE UR QL STRIP.AUTO: NEGATIVE MG/DL
HCT VFR BLD AUTO: 43.1 % (ref 37–47)
HGB BLD-MCNC: 14.6 G/DL (ref 12–16)
IMM GRANULOCYTES # BLD AUTO: 0.01 X10(3)/MCL (ref 0–0.04)
IMM GRANULOCYTES NFR BLD AUTO: 0.1 %
KETONES UR QL STRIP.AUTO: NEGATIVE MG/DL
LEUKOCYTE ESTERASE UR QL STRIP.AUTO: NEGATIVE UNIT/L
LYMPHOCYTES # BLD AUTO: 3.09 X10(3)/MCL (ref 0.6–4.6)
LYMPHOCYTES NFR BLD AUTO: 41 %
MCH RBC QN AUTO: 30.4 PG (ref 27–31)
MCHC RBC AUTO-ENTMCNC: 33.9 G/DL (ref 33–36)
MCV RBC AUTO: 89.8 FL (ref 80–94)
MDMA UR QL SCN: NEGATIVE
MONOCYTES # BLD AUTO: 0.48 X10(3)/MCL (ref 0.1–1.3)
MONOCYTES NFR BLD AUTO: 6.4 %
NEUTROPHILS # BLD AUTO: 3.68 X10(3)/MCL (ref 2.1–9.2)
NEUTROPHILS NFR BLD AUTO: 48.7 %
NITRITE UR QL STRIP.AUTO: NEGATIVE
NRBC BLD AUTO-RTO: 0 %
OPIATES UR QL SCN: NEGATIVE
PCP UR QL: NEGATIVE
PH UR STRIP.AUTO: 6 [PH]
PH UR: 6 [PH] (ref 3–11)
PLATELET # BLD AUTO: 278 X10(3)/MCL (ref 130–400)
PMV BLD AUTO: 8.9 FL (ref 7.4–10.4)
POTASSIUM SERPL-SCNC: 3.7 MMOL/L (ref 3.5–5.1)
PROT SERPL-MCNC: 7 GM/DL (ref 5.8–7.6)
PROT UR QL STRIP.AUTO: NEGATIVE MG/DL
RBC # BLD AUTO: 4.8 X10(6)/MCL (ref 4.2–5.4)
RBC #/AREA URNS AUTO: NORMAL /HPF
RBC UR QL AUTO: ABNORMAL UNIT/L
SODIUM SERPL-SCNC: 134 MMOL/L (ref 136–145)
SP GR UR STRIP.AUTO: <=1.005
SQUAMOUS #/AREA URNS AUTO: NORMAL /HPF
TROPONIN I SERPL-MCNC: <0.01 NG/ML (ref 0–0.04)
UROBILINOGEN UR STRIP-ACNC: 0.2 MG/DL
WBC # SPEC AUTO: 7.54 X10(3)/MCL (ref 4.5–11.5)
WBC #/AREA URNS AUTO: NORMAL /HPF

## 2023-05-18 PROCEDURE — 82077 ASSAY SPEC XCP UR&BREATH IA: CPT | Performed by: STUDENT IN AN ORGANIZED HEALTH CARE EDUCATION/TRAINING PROGRAM

## 2023-05-18 PROCEDURE — 25000003 PHARM REV CODE 250: Performed by: STUDENT IN AN ORGANIZED HEALTH CARE EDUCATION/TRAINING PROGRAM

## 2023-05-18 PROCEDURE — 99285 EMERGENCY DEPT VISIT HI MDM: CPT | Mod: 25

## 2023-05-18 PROCEDURE — 84484 ASSAY OF TROPONIN QUANT: CPT | Performed by: STUDENT IN AN ORGANIZED HEALTH CARE EDUCATION/TRAINING PROGRAM

## 2023-05-18 PROCEDURE — 81001 URINALYSIS AUTO W/SCOPE: CPT | Mod: 59 | Performed by: STUDENT IN AN ORGANIZED HEALTH CARE EDUCATION/TRAINING PROGRAM

## 2023-05-18 PROCEDURE — 80307 DRUG TEST PRSMV CHEM ANLYZR: CPT | Performed by: STUDENT IN AN ORGANIZED HEALTH CARE EDUCATION/TRAINING PROGRAM

## 2023-05-18 PROCEDURE — 93005 ELECTROCARDIOGRAM TRACING: CPT

## 2023-05-18 PROCEDURE — 80053 COMPREHEN METABOLIC PANEL: CPT | Performed by: STUDENT IN AN ORGANIZED HEALTH CARE EDUCATION/TRAINING PROGRAM

## 2023-05-18 PROCEDURE — 85025 COMPLETE CBC W/AUTO DIFF WBC: CPT | Performed by: STUDENT IN AN ORGANIZED HEALTH CARE EDUCATION/TRAINING PROGRAM

## 2023-05-18 PROCEDURE — 96360 HYDRATION IV INFUSION INIT: CPT

## 2023-05-18 PROCEDURE — 63600175 PHARM REV CODE 636 W HCPCS: Performed by: STUDENT IN AN ORGANIZED HEALTH CARE EDUCATION/TRAINING PROGRAM

## 2023-05-18 RX ORDER — DIPHENHYDRAMINE HYDROCHLORIDE 50 MG/ML
12.5 INJECTION INTRAMUSCULAR; INTRAVENOUS
Status: DISCONTINUED | OUTPATIENT
Start: 2023-05-18 | End: 2023-05-18

## 2023-05-18 RX ORDER — METOCLOPRAMIDE HYDROCHLORIDE 5 MG/ML
10 INJECTION INTRAMUSCULAR; INTRAVENOUS
Status: DISCONTINUED | OUTPATIENT
Start: 2023-05-18 | End: 2023-05-18

## 2023-05-18 RX ADMIN — THIAMINE HYDROCHLORIDE: 100 INJECTION, SOLUTION INTRAMUSCULAR; INTRAVENOUS at 05:05

## 2023-05-18 NOTE — ED PROVIDER NOTES
Encounter Date: 5/18/2023       History     Chief Complaint   Patient presents with    Fall     Fall at home unsure what she hit her head on or if there was LOC.Pt lives by herself +ETOH of unknown amount slurring of words GCS 14     63yo female past medical history hypertension, COPD, GERD, osteoporosis brought in by EMS for fall. EMS reports she could not tell them about the fall, where she fell or how long she was down. Daughter had called ED earlier in night and said she had been drinking and fell, requested nursing to go check on her at home. Patient oriented to self, place, situation. States knows she was at home and got up and fell but unsure how, and unsure if she lost consciousness. States had a lot of beers last night. States her head hurts.     Review of patient's allergies indicates:  No Known Allergies  Past Medical History:   Diagnosis Date    COPD (chronic obstructive pulmonary disease)     GERD (gastroesophageal reflux disease)     Hyperlipidemia     Hypertension     Osteoporosis      Past Surgical History:   Procedure Laterality Date    ADENOIDECTOMY      HYSTERECTOMY      TONSILLECTOMY       No family history on file.  Social History     Tobacco Use    Smoking status: Every Day     Packs/day: 1.00     Types: Cigarettes    Smokeless tobacco: Never   Substance Use Topics    Alcohol use: Yes     Alcohol/week: 2.0 standard drinks     Types: 2 Cans of beer per week     Review of Systems   Constitutional:  Negative for fever.   HENT:  Negative for sore throat.    Respiratory:  Negative for shortness of breath.    Cardiovascular:  Negative for chest pain.   Gastrointestinal:  Negative for nausea.   Genitourinary:  Negative for dysuria.   Musculoskeletal:  Negative for back pain.        Fall   Skin:  Negative for rash.   Neurological:  Positive for headaches. Negative for weakness.   Hematological:  Does not bruise/bleed easily.     Physical Exam     Initial Vitals [05/18/23 0437]   BP Pulse Resp Temp SpO2    (!) 143/82 89 20 98.1 °F (36.7 °C) 99 %      MAP       --         Physical Exam    Vitals reviewed.  Constitutional: She appears well-developed and well-nourished.   HENT:   Head: Normocephalic.   Right Ear: External ear normal.   Left Ear: External ear normal.   Nose: Nose normal.   Mouth/Throat: Oropharynx is clear and moist.   Hematoma left frontal scalp   Eyes: Conjunctivae and EOM are normal. Pupils are equal, round, and reactive to light.   Neck: Neck supple.   Normal range of motion.  Cardiovascular:  Normal rate and regular rhythm.           Pulmonary/Chest: Breath sounds normal. No respiratory distress.   Abdominal: Abdomen is soft. Bowel sounds are normal. There is no abdominal tenderness.   Musculoskeletal:         General: Normal range of motion.      Cervical back: Normal range of motion and neck supple.     Neurological: She is alert and oriented to person, place, and time.   Slurred speech     Skin: Skin is warm and dry. Capillary refill takes less than 2 seconds.       ED Course   Procedures  Labs Reviewed   ALCOHOL,MEDICAL (ETHANOL) - Abnormal; Notable for the following components:       Result Value    Ethanol Level 319.4 (*)     All other components within normal limits   COMPREHENSIVE METABOLIC PANEL - Abnormal; Notable for the following components:    Sodium Level 134 (*)     Chloride 95 (*)     Blood Urea Nitrogen 4.0 (*)     All other components within normal limits   URINALYSIS, REFLEX TO URINE CULTURE - Abnormal; Notable for the following components:    Blood, UA Trace (*)     All other components within normal limits   TROPONIN I - Normal   CBC W/ AUTO DIFFERENTIAL    Narrative:     The following orders were created for panel order CBC auto differential.  Procedure                               Abnormality         Status                     ---------                               -----------         ------                     CBC with Differential[[id number]]                             Final result                 Please view results for these tests on the individual orders.   CBC WITH DIFFERENTIAL   DRUG SCREEN, URINE (BEAKER)   URINALYSIS, MICROSCOPIC     EKG Readings: (Independently Interpreted)   Initial Reading: No STEMI. Rhythm: Normal Sinus Rhythm. Heart Rate: 78. Ectopy: No Ectopy. Conduction: Normal. ST Segments: Normal ST Segments. T Waves: Normal. Clinical Impression: Normal Sinus Rhythm   ECG Results              EKG 12-lead (In process)  Result time 05/18/23 05:02:45      In process by Interface, Lab In [facility] (05/18/23 05:02:45)                   Narrative:    Test Reason : W19.XXXA,    Vent. Rate : 078 BPM     Atrial Rate : 078 BPM     P-R Int : 186 ms          QRS Dur : 078 ms      QT Int : 368 ms       P-R-T Axes : 067 031 076 degrees     QTc Int : 419 ms    Normal sinus rhythm  Septal infarct ,age undetermined  Abnormal ECG  No previous ECGs available    Referred By: [email or web address]   SELF           Confirmed By:                                   Imaging Results              CT Head Without Contrast (Preliminary result)  Result time 05/18/23 04:43:33      Preliminary result by Misbah Escalona Jr., MD (05/18/23 04:43:33)                   Narrative:    START OF REPORT:  TECHNIQUE: CT OF THE HEAD WAS PERFORMED WITHOUT INTRAVENOUS CONTRAST WITH AXIAL AS WELL AS CORONAL AND SAGITTAL IMAGES.    COMPARISON: NONE.    DOSAGE INFORMATION: AUTOMATED EXPOSURE CONTROL WAS UTILIZED.    CLINICAL HISTORY: FALL AT HOME UNSURE WHAT SHE HIT HER HEAD ON OR IF THERE WAS LOC.PT LIVES BY HERSELF +ETOH OF UNKNOWN AMOUNT SLURRING OF WORDS.    Findings:  Hemorrhage: No acute intracranial hemorrhage is seen.  CSF spaces: The ventricles, sulci and basal cisterns all appear mildly prominent consistent with global cerebral atrophy.  Brain parenchyma: There is preservation of the grey white junction throughout. Mild microvascular change is seen in portions of the periventricular and deep white matter  tracts.  Cerebellum: Unremarkable.  Sella and skull base: The sella appears to be within normal limits for age.  Herniation: None.  Intracranial calcifications: Incidental note is made of bilateral choroid plexus calcification. Incidental note is made of some pineal region calcification.  Calvarium: No acute linear or depressed skull fracture is seen.  Scalp: There is a small left frontal scalp hematoma without underlying calvarial fracture.    Maxillofacial Structures:  Paranasal sinuses: The visualized paranasal sinuses appear clear with no mucoperiosteal thickening or air fluid levels identified.  Orbits: The orbits appear unremarkable.  Zygomatic arches: The zygomatic arches are intact and unremarkable.  Temporal bones and mastoids: The temporal bones and mastoids appear unremarkable.  TMJ: The mandibular condyles appear normally placed with respect to the mandibular fossa.      Impression:  1. There is a small left frontal scalp hematoma without underlying calvarial fracture.  2. No acute intracranial traumatic injury identified. Details and other findings as noted above.                                         Medications   sodium chloride 0.9% 1,000 mL with mvi, (ADULT) no.4 with vit K 3,300 unit- 150 mcg/10 mL 10 mL, thiamine 100 mg, folic acid 1 mg infusion ( Intravenous New Bag 5/18/23 0545)     Medical Decision Making:   Initial Assessment:   63yo with fall  Differential Diagnosis:   Fall, hematoma, alcohol intoxication  Clinical Tests:   Lab Tests: Ordered and Reviewed  Radiological Study: Ordered and Reviewed  Medical Tests: Ordered and Reviewed  ED Management:  Banana bag given.  CT head with hematoma, ice pack placed                          Clinical Impression:   Final diagnoses:  [W19.XXXA] Fall  [S00.03XA] Contusion of scalp, initial encounter (Primary)  [F10.920] Alcoholic intoxication without complication        ED Disposition Condition    Discharge Stable          ED Prescriptions    None        Follow-up Information       Follow up With Specialties Details Why Contact Info    Donis Mcneil MD Family Medicine Schedule an appointment as soon as possible for a visit   [address]  [place] [address]  [phone]      [facility] Emergency Medicine  If symptoms worsen, As needed [address] [place] [id number]  [phone]             Carlotta Sorto,   05/18/23 0558

## 2023-05-18 NOTE — ED NOTES
Comfort measures provided, UA brought to lab. IV infusing well with no reddness ,swelling or discomfort

## 2023-05-18 NOTE — ED NOTES
Her daughter is leaving. She ask that we call her aunt to return patient home. Aron Jaimes [phone]

## 2023-05-18 NOTE — ED NOTES
Patient assisted  with bed side commode, she is able to stand from stretcher to BSC without losing her balance. IV continuous at this time. She voided 1000 cc clear urine, collected sample to lab.

## 2023-05-18 NOTE — ED NOTES
Pt awake and asked to go home.  Notified Dr. Hartman.  Stated ok to go home if she wants to. Called Mrs. Jaimes [phone] for ride.

## 2023-05-30 ENCOUNTER — PATIENT MESSAGE (OUTPATIENT)
Dept: ADMINISTRATIVE | Facility: HOSPITAL | Age: 63
End: 2023-05-30
Payer: MEDICAID

## 2023-05-30 DIAGNOSIS — R07.9 CHEST PAIN, UNSPECIFIED TYPE: ICD-10-CM

## 2023-05-30 RX ORDER — DIGOXIN 125 MCG
TABLET ORAL
Qty: 30 TABLET | Refills: 3 | Status: SHIPPED | OUTPATIENT
Start: 2023-05-30 | End: 2023-10-10

## 2023-06-08 ENCOUNTER — OFFICE VISIT (OUTPATIENT)
Dept: FAMILY MEDICINE | Facility: CLINIC | Age: 63
End: 2023-06-08
Payer: MEDICAID

## 2023-06-08 VITALS
OXYGEN SATURATION: 96 % | BODY MASS INDEX: 20.13 KG/M2 | TEMPERATURE: 97 F | HEIGHT: 63 IN | WEIGHT: 113.63 LBS | RESPIRATION RATE: 20 BRPM | SYSTOLIC BLOOD PRESSURE: 132 MMHG | HEART RATE: 78 BPM | DIASTOLIC BLOOD PRESSURE: 82 MMHG

## 2023-06-08 DIAGNOSIS — Z12.31 SCREENING MAMMOGRAM, ENCOUNTER FOR: ICD-10-CM

## 2023-06-08 DIAGNOSIS — Z12.11 COLON CANCER SCREENING: ICD-10-CM

## 2023-06-08 DIAGNOSIS — Z00.00 WELLNESS EXAMINATION: Primary | ICD-10-CM

## 2023-06-08 PROCEDURE — 3079F PR MOST RECENT DIASTOLIC BLOOD PRESSURE 80-89 MM HG: ICD-10-PCS | Mod: CPTII,,, | Performed by: FAMILY MEDICINE

## 2023-06-08 PROCEDURE — 1160F PR REVIEW ALL MEDS BY PRESCRIBER/CLIN PHARMACIST DOCUMENTED: ICD-10-PCS | Mod: CPTII,,, | Performed by: FAMILY MEDICINE

## 2023-06-08 PROCEDURE — 3079F DIAST BP 80-89 MM HG: CPT | Mod: CPTII,,, | Performed by: FAMILY MEDICINE

## 2023-06-08 PROCEDURE — 99396 PR PREVENTIVE VISIT,EST,40-64: ICD-10-PCS | Mod: ,,, | Performed by: FAMILY MEDICINE

## 2023-06-08 PROCEDURE — 4010F ACE/ARB THERAPY RXD/TAKEN: CPT | Mod: CPTII,,, | Performed by: FAMILY MEDICINE

## 2023-06-08 PROCEDURE — 3008F BODY MASS INDEX DOCD: CPT | Mod: CPTII,,, | Performed by: FAMILY MEDICINE

## 2023-06-08 PROCEDURE — 1159F MED LIST DOCD IN RCRD: CPT | Mod: CPTII,,, | Performed by: FAMILY MEDICINE

## 2023-06-08 PROCEDURE — 3075F PR MOST RECENT SYSTOLIC BLOOD PRESS GE 130-139MM HG: ICD-10-PCS | Mod: CPTII,,, | Performed by: FAMILY MEDICINE

## 2023-06-08 PROCEDURE — 1159F PR MEDICATION LIST DOCUMENTED IN MEDICAL RECORD: ICD-10-PCS | Mod: CPTII,,, | Performed by: FAMILY MEDICINE

## 2023-06-08 PROCEDURE — 3008F PR BODY MASS INDEX (BMI) DOCUMENTED: ICD-10-PCS | Mod: CPTII,,, | Performed by: FAMILY MEDICINE

## 2023-06-08 PROCEDURE — 99396 PREV VISIT EST AGE 40-64: CPT | Mod: ,,, | Performed by: FAMILY MEDICINE

## 2023-06-08 PROCEDURE — 3075F SYST BP GE 130 - 139MM HG: CPT | Mod: CPTII,,, | Performed by: FAMILY MEDICINE

## 2023-06-08 PROCEDURE — 1160F RVW MEDS BY RX/DR IN RCRD: CPT | Mod: CPTII,,, | Performed by: FAMILY MEDICINE

## 2023-06-08 PROCEDURE — 4010F PR ACE/ARB THEARPY RXD/TAKEN: ICD-10-PCS | Mod: CPTII,,, | Performed by: FAMILY MEDICINE

## 2023-06-08 NOTE — PROGRESS NOTES
"Subjective:       Patient ID: Antonina Jaimes is a 62 y.o. female.    Chief Complaint: wellness      Wellness      Review of Systems   Constitutional: Negative.    Respiratory: Negative.     Cardiovascular: Negative.    Gastrointestinal: Negative.    Psychiatric/Behavioral: Negative.           Objective:      /82 (BP Location: Left arm, Patient Position: Sitting, BP Method: Large (Manual))   Pulse 78   Temp 97.2 °F (36.2 °C)   Resp 20   Ht 5' 3" (1.6 m)   Wt 51.5 kg (113 lb 9.6 oz)   SpO2 96%   BMI 20.12 kg/m²    Physical Exam  Constitutional:       Appearance: Normal appearance.   Cardiovascular:      Rate and Rhythm: Normal rate and regular rhythm.   Pulmonary:      Effort: Pulmonary effort is normal.      Breath sounds: Normal breath sounds.   Abdominal:      General: Abdomen is flat. Bowel sounds are normal.      Palpations: Abdomen is soft.   Neurological:      Mental Status: She is alert.   Psychiatric:         Mood and Affect: Mood normal.         Behavior: Behavior normal.         Thought Content: Thought content normal.         Judgment: Judgment normal.             Assessment:       Problem List Items Addressed This Visit    None  Visit Diagnoses       Wellness examination    -  Primary    Relevant Orders    Mammo Digital Screening Bilat w/ Florentino    Colon cancer screening        Relevant Orders    Cologuard Screening (Multitarget Stool DNA)    Screening mammogram, encounter for        Relevant Orders    Mammo Digital Screening Bilat w/ Florentino               Plan:   1. Wellness examination  -     Mammo Digital Screening Bilat w/ Florentino; Future; Expected date: 06/08/2023  Lab work scheduled  Continue current medication  Continue diet/exercise  Return to clinic with any concerns    2. Colon cancer screening  -     Cologuard Screening (Multitarget Stool DNA); Future; Expected date: 06/08/2023  Schedule cologuard    3. Screening mammogram, encounter for  -     Mammo Digital Screening Bilat w/ Florentino; " Future; Expected date: 06/08/2023  Schedule mammogram

## 2023-07-02 LAB — NONINV COLON CA DNA+OCC BLD SCRN STL QL: POSITIVE

## 2023-07-03 NOTE — PROGRESS NOTES
POSITIVE cologuard. Will need referral for a diagnostic colonoscopy.  Please refer patient to Dr. Hurd

## 2023-07-05 ENCOUNTER — TELEPHONE (OUTPATIENT)
Dept: FAMILY MEDICINE | Facility: CLINIC | Age: 63
End: 2023-07-05
Payer: MEDICAID

## 2023-07-05 NOTE — TELEPHONE ENCOUNTER
----- Message from Donis Mcneil MD sent at 7/3/2023 10:47 AM CDT -----  POSITIVE cologuard. Will need referral for a diagnostic colonoscopy.  Please refer patient to Dr. Hurd

## 2023-07-06 ENCOUNTER — LAB VISIT (OUTPATIENT)
Dept: LAB | Facility: HOSPITAL | Age: 63
End: 2023-07-06
Attending: FAMILY MEDICINE
Payer: MEDICAID

## 2023-07-06 DIAGNOSIS — Z11.4 ENCOUNTER FOR SCREENING FOR HIV: ICD-10-CM

## 2023-07-06 DIAGNOSIS — Z11.59 NEED FOR HEPATITIS C SCREENING TEST: ICD-10-CM

## 2023-07-06 DIAGNOSIS — Z13.6 SCREENING FOR ISCHEMIC HEART DISEASE: ICD-10-CM

## 2023-07-06 DIAGNOSIS — Z00.00 WELLNESS EXAMINATION: ICD-10-CM

## 2023-07-06 LAB
ALBUMIN SERPL-MCNC: 4.4 G/DL (ref 3.4–4.8)
ALBUMIN/GLOB SERPL: 1.6 RATIO (ref 1.1–2)
ALP SERPL-CCNC: 60 UNIT/L (ref 40–150)
ALT SERPL-CCNC: 21 UNIT/L (ref 0–55)
AST SERPL-CCNC: 29 UNIT/L (ref 5–34)
BASOPHILS # BLD AUTO: 0.04 X10(3)/MCL
BASOPHILS NFR BLD AUTO: 0.5 %
BILIRUBIN DIRECT+TOT PNL SERPL-MCNC: 1.6 MG/DL
BUN SERPL-MCNC: 7 MG/DL (ref 9.8–20.1)
CALCIUM SERPL-MCNC: 10.2 MG/DL (ref 8.4–10.2)
CHLORIDE SERPL-SCNC: 97 MMOL/L (ref 98–107)
CHOLEST SERPL-MCNC: 186 MG/DL
CHOLEST/HDLC SERPL: 2 {RATIO} (ref 0–5)
CO2 SERPL-SCNC: 30 MMOL/L (ref 23–31)
CREAT SERPL-MCNC: 0.71 MG/DL (ref 0.55–1.02)
EOSINOPHIL # BLD AUTO: 0.27 X10(3)/MCL (ref 0–0.9)
EOSINOPHIL NFR BLD AUTO: 3.6 %
ERYTHROCYTE [DISTWIDTH] IN BLOOD BY AUTOMATED COUNT: 11.8 % (ref 11.5–17)
GFR SERPLBLD CREATININE-BSD FMLA CKD-EPI: >60 MLS/MIN/1.73/M2
GLOBULIN SER-MCNC: 2.8 GM/DL (ref 2.4–3.5)
GLUCOSE SERPL-MCNC: 86 MG/DL (ref 82–115)
HCT VFR BLD AUTO: 42.3 % (ref 37–47)
HCV AB SERPL QL IA: NONREACTIVE
HDLC SERPL-MCNC: 99 MG/DL (ref 35–60)
HGB BLD-MCNC: 14.6 G/DL (ref 12–16)
HIV 1+2 AB+HIV1 P24 AG SERPL QL IA: NONREACTIVE
IMM GRANULOCYTES # BLD AUTO: 0.02 X10(3)/MCL (ref 0–0.04)
IMM GRANULOCYTES NFR BLD AUTO: 0.3 %
LDLC SERPL CALC-MCNC: 72 MG/DL (ref 50–140)
LYMPHOCYTES # BLD AUTO: 2.86 X10(3)/MCL (ref 0.6–4.6)
LYMPHOCYTES NFR BLD AUTO: 38.3 %
MCH RBC QN AUTO: 30.5 PG (ref 27–31)
MCHC RBC AUTO-ENTMCNC: 34.5 G/DL (ref 33–36)
MCV RBC AUTO: 88.3 FL (ref 80–94)
MONOCYTES # BLD AUTO: 0.7 X10(3)/MCL (ref 0.1–1.3)
MONOCYTES NFR BLD AUTO: 9.4 %
NEUTROPHILS # BLD AUTO: 3.58 X10(3)/MCL (ref 2.1–9.2)
NEUTROPHILS NFR BLD AUTO: 47.9 %
NRBC BLD AUTO-RTO: 0 %
PLATELET # BLD AUTO: 263 X10(3)/MCL (ref 130–400)
PMV BLD AUTO: 8.8 FL (ref 7.4–10.4)
POTASSIUM SERPL-SCNC: 3.9 MMOL/L (ref 3.5–5.1)
PROT SERPL-MCNC: 7.2 GM/DL (ref 5.8–7.6)
RBC # BLD AUTO: 4.79 X10(6)/MCL (ref 4.2–5.4)
SODIUM SERPL-SCNC: 137 MMOL/L (ref 136–145)
TRIGL SERPL-MCNC: 73 MG/DL (ref 37–140)
VLDLC SERPL CALC-MCNC: 15 MG/DL
WBC # SPEC AUTO: 7.47 X10(3)/MCL (ref 4.5–11.5)

## 2023-07-06 PROCEDURE — 85025 COMPLETE CBC W/AUTO DIFF WBC: CPT

## 2023-07-06 PROCEDURE — 36415 COLL VENOUS BLD VENIPUNCTURE: CPT

## 2023-07-06 PROCEDURE — 80053 COMPREHEN METABOLIC PANEL: CPT

## 2023-07-06 PROCEDURE — 86803 HEPATITIS C AB TEST: CPT

## 2023-07-06 PROCEDURE — 87389 HIV-1 AG W/HIV-1&-2 AB AG IA: CPT

## 2023-07-06 PROCEDURE — 80061 LIPID PANEL: CPT

## 2023-07-10 ENCOUNTER — TELEPHONE (OUTPATIENT)
Dept: FAMILY MEDICINE | Facility: CLINIC | Age: 63
End: 2023-07-10
Payer: MEDICAID

## 2023-07-13 ENCOUNTER — TELEPHONE (OUTPATIENT)
Dept: FAMILY MEDICINE | Facility: CLINIC | Age: 63
End: 2023-07-13
Payer: MEDICAID

## 2023-07-13 DIAGNOSIS — R19.5 POSITIVE COLORECTAL CANCER SCREENING USING COLOGUARD TEST: Primary | ICD-10-CM

## 2023-07-13 NOTE — TELEPHONE ENCOUNTER
----- Message from Donis Mcneil MD sent at 7/6/2023 12:57 PM CDT -----  Please inform patient of lab results, which are all within acceptable ranges.

## 2023-07-13 NOTE — TELEPHONE ENCOUNTER
Patient given results. Has had black stools in the last two day. Instructed her to go to [facility] now.

## 2023-07-14 ENCOUNTER — HOSPITAL ENCOUNTER (OUTPATIENT)
Facility: HOSPITAL | Age: 63
Discharge: HOME | End: 2023-07-16
Attending: EMERGENCY MEDICINE | Admitting: INTERNAL MEDICINE
Payer: MEDICAID

## 2023-07-14 DIAGNOSIS — K92.2 GASTROINTESTINAL HEMORRHAGE, UNSPECIFIED GASTROINTESTINAL HEMORRHAGE TYPE: Primary | ICD-10-CM

## 2023-07-14 DIAGNOSIS — R07.9 CHEST PAIN: ICD-10-CM

## 2023-07-14 DIAGNOSIS — K92.2 GI BLEEDING: ICD-10-CM

## 2023-07-14 DIAGNOSIS — R10.9 ABDOMINAL PAIN, UNSPECIFIED ABDOMINAL LOCATION: ICD-10-CM

## 2023-07-14 LAB
ABORH RETYPE: NORMAL
ALBUMIN SERPL-MCNC: 4.2 G/DL (ref 3.4–4.8)
ALBUMIN/GLOB SERPL: 1.7 RATIO (ref 1.1–2)
ALP SERPL-CCNC: 51 UNIT/L (ref 40–150)
ALT SERPL-CCNC: 14 UNIT/L (ref 0–55)
APPEARANCE UR: CLEAR
APTT PPP: 28.8 SECONDS (ref 23.2–33.7)
AST SERPL-CCNC: 21 UNIT/L (ref 5–34)
BACTERIA #/AREA URNS AUTO: NORMAL /HPF
BASOPHILS # BLD AUTO: 0.04 X10(3)/MCL
BASOPHILS NFR BLD AUTO: 0.6 %
BILIRUB UR QL STRIP.AUTO: NEGATIVE
BILIRUBIN DIRECT+TOT PNL SERPL-MCNC: 0.3 MG/DL
BUN SERPL-MCNC: 9.2 MG/DL (ref 9.8–20.1)
CALCIUM SERPL-MCNC: 9.2 MG/DL (ref 8.4–10.2)
CHLORIDE SERPL-SCNC: 103 MMOL/L (ref 98–107)
CO2 SERPL-SCNC: 27 MMOL/L (ref 23–31)
COLOR UR: YELLOW
CREAT SERPL-MCNC: 0.7 MG/DL (ref 0.55–1.02)
EOSINOPHIL # BLD AUTO: 0.14 X10(3)/MCL (ref 0–0.9)
EOSINOPHIL NFR BLD AUTO: 1.9 %
ERYTHROCYTE [DISTWIDTH] IN BLOOD BY AUTOMATED COUNT: 12.6 % (ref 11.5–17)
FERRITIN SERPL-MCNC: 46.35 NG/ML (ref 4.63–204)
GFR SERPLBLD CREATININE-BSD FMLA CKD-EPI: >60 MLS/MIN/1.73/M2
GLOBULIN SER-MCNC: 2.5 GM/DL (ref 2.4–3.5)
GLUCOSE SERPL-MCNC: 86 MG/DL (ref 82–115)
GLUCOSE UR QL STRIP.AUTO: NEGATIVE
GROUP & RH: NORMAL
HCT VFR BLD AUTO: 26.5 % (ref 37–47)
HGB BLD-MCNC: 9 G/DL (ref 12–16)
IMM GRANULOCYTES # BLD AUTO: 0.02 X10(3)/MCL (ref 0–0.04)
IMM GRANULOCYTES NFR BLD AUTO: 0.3 %
INDIRECT COOMBS GEL: NORMAL
INR BLD: 0.93 (ref 0–1.3)
IRON SATN MFR SERPL: 21 % (ref 20–50)
IRON SERPL-MCNC: 59 UG/DL (ref 50–170)
KETONES UR QL STRIP.AUTO: NEGATIVE
LEUKOCYTE ESTERASE UR QL STRIP.AUTO: NEGATIVE
LIPASE SERPL-CCNC: 34 U/L
LYMPHOCYTES # BLD AUTO: 1.85 X10(3)/MCL (ref 0.6–4.6)
LYMPHOCYTES NFR BLD AUTO: 25.6 %
MAGNESIUM SERPL-MCNC: 1.9 MG/DL (ref 1.6–2.6)
MCH RBC QN AUTO: 30.8 PG (ref 27–31)
MCHC RBC AUTO-ENTMCNC: 34 G/DL (ref 33–36)
MCV RBC AUTO: 90.8 FL (ref 80–94)
MONOCYTES # BLD AUTO: 0.5 X10(3)/MCL (ref 0.1–1.3)
MONOCYTES NFR BLD AUTO: 6.9 %
NEUTROPHILS # BLD AUTO: 4.68 X10(3)/MCL (ref 2.1–9.2)
NEUTROPHILS NFR BLD AUTO: 64.7 %
NITRITE UR QL STRIP.AUTO: NEGATIVE
NRBC BLD AUTO-RTO: 0 %
PH UR STRIP.AUTO: 6 [PH]
PLATELET # BLD AUTO: 248 X10(3)/MCL (ref 130–400)
PMV BLD AUTO: 9.3 FL (ref 7.4–10.4)
POTASSIUM SERPL-SCNC: 4.1 MMOL/L (ref 3.5–5.1)
PROT SERPL-MCNC: 6.7 GM/DL (ref 5.8–7.6)
PROT UR QL STRIP.AUTO: NEGATIVE
PROTHROMBIN TIME: 12.4 SECONDS (ref 12.5–14.5)
RBC # BLD AUTO: 2.92 X10(6)/MCL (ref 4.2–5.4)
RBC #/AREA URNS AUTO: <5 /HPF
RBC UR QL AUTO: NEGATIVE
SODIUM SERPL-SCNC: 136 MMOL/L (ref 136–145)
SP GR UR STRIP.AUTO: 1.02 (ref 1–1.03)
SPECIMEN OUTDATE: NORMAL
SQUAMOUS #/AREA URNS AUTO: <5 /HPF
TIBC SERPL-MCNC: 224 UG/DL (ref 70–310)
TIBC SERPL-MCNC: 283 UG/DL (ref 250–450)
TRANSFERRIN SERPL-MCNC: 258 MG/DL (ref 173–360)
UROBILINOGEN UR STRIP-ACNC: 1
VIT B12 SERPL-MCNC: 456 PG/ML (ref 213–816)
WBC # SPEC AUTO: 7.23 X10(3)/MCL (ref 4.5–11.5)
WBC #/AREA URNS AUTO: <5 /HPF

## 2023-07-14 PROCEDURE — G0378 HOSPITAL OBSERVATION PER HR: HCPCS

## 2023-07-14 PROCEDURE — 25000003 PHARM REV CODE 250: Performed by: PHYSICIAN ASSISTANT

## 2023-07-14 PROCEDURE — 99285 EMERGENCY DEPT VISIT HI MDM: CPT | Mod: 25

## 2023-07-14 PROCEDURE — S4991 NICOTINE PATCH NONLEGEND: HCPCS | Performed by: PHYSICIAN ASSISTANT

## 2023-07-14 PROCEDURE — 63600175 PHARM REV CODE 636 W HCPCS: Performed by: INTERNAL MEDICINE

## 2023-07-14 PROCEDURE — 63600175 PHARM REV CODE 636 W HCPCS: Performed by: EMERGENCY MEDICINE

## 2023-07-14 PROCEDURE — 85610 PROTHROMBIN TIME: CPT | Performed by: NURSE PRACTITIONER

## 2023-07-14 PROCEDURE — 25000003 PHARM REV CODE 250: Performed by: EMERGENCY MEDICINE

## 2023-07-14 PROCEDURE — 83690 ASSAY OF LIPASE: CPT | Performed by: NURSE PRACTITIONER

## 2023-07-14 PROCEDURE — 83550 IRON BINDING TEST: CPT | Performed by: INTERNAL MEDICINE

## 2023-07-14 PROCEDURE — 11000001 HC ACUTE MED/SURG PRIVATE ROOM

## 2023-07-14 PROCEDURE — 82728 ASSAY OF FERRITIN: CPT | Performed by: INTERNAL MEDICINE

## 2023-07-14 PROCEDURE — C9113 INJ PANTOPRAZOLE SODIUM, VIA: HCPCS | Performed by: EMERGENCY MEDICINE

## 2023-07-14 PROCEDURE — 25500020 PHARM REV CODE 255: Performed by: PHYSICIAN ASSISTANT

## 2023-07-14 PROCEDURE — 96375 TX/PRO/DX INJ NEW DRUG ADDON: CPT

## 2023-07-14 PROCEDURE — 80053 COMPREHEN METABOLIC PANEL: CPT | Performed by: NURSE PRACTITIONER

## 2023-07-14 PROCEDURE — 85025 COMPLETE CBC W/AUTO DIFF WBC: CPT | Performed by: NURSE PRACTITIONER

## 2023-07-14 PROCEDURE — 81001 URINALYSIS AUTO W/SCOPE: CPT | Performed by: NURSE PRACTITIONER

## 2023-07-14 PROCEDURE — 82607 VITAMIN B-12: CPT | Performed by: INTERNAL MEDICINE

## 2023-07-14 PROCEDURE — 96365 THER/PROPH/DIAG IV INF INIT: CPT | Mod: 59

## 2023-07-14 PROCEDURE — 96366 THER/PROPH/DIAG IV INF ADDON: CPT

## 2023-07-14 PROCEDURE — 85730 THROMBOPLASTIN TIME PARTIAL: CPT | Performed by: NURSE PRACTITIONER

## 2023-07-14 PROCEDURE — 83735 ASSAY OF MAGNESIUM: CPT | Performed by: NURSE PRACTITIONER

## 2023-07-14 PROCEDURE — 86900 BLOOD TYPING SEROLOGIC ABO: CPT | Performed by: NURSE PRACTITIONER

## 2023-07-14 RX ORDER — ATORVASTATIN CALCIUM 10 MG/1
10 TABLET, FILM COATED ORAL DAILY
Status: DISCONTINUED | OUTPATIENT
Start: 2023-07-15 | End: 2023-07-16 | Stop reason: HOSPADM

## 2023-07-14 RX ORDER — PROCHLORPERAZINE EDISYLATE 5 MG/ML
5 INJECTION INTRAMUSCULAR; INTRAVENOUS EVERY 6 HOURS PRN
Status: DISCONTINUED | OUTPATIENT
Start: 2023-07-14 | End: 2023-07-16 | Stop reason: HOSPADM

## 2023-07-14 RX ORDER — PANTOPRAZOLE SODIUM 40 MG/10ML
40 INJECTION, POWDER, LYOPHILIZED, FOR SOLUTION INTRAVENOUS EVERY 12 HOURS
Status: DISCONTINUED | OUTPATIENT
Start: 2023-07-15 | End: 2023-07-16 | Stop reason: HOSPADM

## 2023-07-14 RX ORDER — HYDRALAZINE HYDROCHLORIDE 20 MG/ML
10 INJECTION INTRAMUSCULAR; INTRAVENOUS EVERY 4 HOURS PRN
Status: DISCONTINUED | OUTPATIENT
Start: 2023-07-14 | End: 2023-07-16 | Stop reason: HOSPADM

## 2023-07-14 RX ORDER — IBUPROFEN 200 MG
1 TABLET ORAL DAILY
Status: DISCONTINUED | OUTPATIENT
Start: 2023-07-15 | End: 2023-07-16 | Stop reason: HOSPADM

## 2023-07-14 RX ORDER — ONDANSETRON 2 MG/ML
4 INJECTION INTRAMUSCULAR; INTRAVENOUS EVERY 4 HOURS PRN
Status: DISCONTINUED | OUTPATIENT
Start: 2023-07-14 | End: 2023-07-16 | Stop reason: HOSPADM

## 2023-07-14 RX ORDER — AMOXICILLIN 250 MG
2 CAPSULE ORAL 2 TIMES DAILY PRN
Status: DISCONTINUED | OUTPATIENT
Start: 2023-07-14 | End: 2023-07-16 | Stop reason: HOSPADM

## 2023-07-14 RX ORDER — SODIUM CHLORIDE 0.9 % (FLUSH) 0.9 %
10 SYRINGE (ML) INJECTION
Status: DISCONTINUED | OUTPATIENT
Start: 2023-07-14 | End: 2023-07-16 | Stop reason: HOSPADM

## 2023-07-14 RX ORDER — PANTOPRAZOLE SODIUM 40 MG/10ML
80 INJECTION, POWDER, LYOPHILIZED, FOR SOLUTION INTRAVENOUS
Status: COMPLETED | OUTPATIENT
Start: 2023-07-14 | End: 2023-07-14

## 2023-07-14 RX ORDER — MAG HYDROX/ALUMINUM HYD/SIMETH 200-200-20
30 SUSPENSION, ORAL (FINAL DOSE FORM) ORAL 4 TIMES DAILY PRN
Status: DISCONTINUED | OUTPATIENT
Start: 2023-07-14 | End: 2023-07-16 | Stop reason: HOSPADM

## 2023-07-14 RX ORDER — ACETAMINOPHEN 500 MG
1000 TABLET ORAL EVERY 6 HOURS PRN
Status: DISCONTINUED | OUTPATIENT
Start: 2023-07-14 | End: 2023-07-16 | Stop reason: HOSPADM

## 2023-07-14 RX ORDER — ASPIRIN 81 MG/1
81 TABLET ORAL DAILY
Status: DISCONTINUED | OUTPATIENT
Start: 2023-07-15 | End: 2023-07-14

## 2023-07-14 RX ORDER — IPRATROPIUM BROMIDE AND ALBUTEROL SULFATE 2.5; .5 MG/3ML; MG/3ML
3 SOLUTION RESPIRATORY (INHALATION) EVERY 4 HOURS PRN
Status: DISCONTINUED | OUTPATIENT
Start: 2023-07-14 | End: 2023-07-16 | Stop reason: HOSPADM

## 2023-07-14 RX ORDER — TRAZODONE HYDROCHLORIDE 50 MG/1
50 TABLET ORAL NIGHTLY
Status: DISCONTINUED | OUTPATIENT
Start: 2023-07-14 | End: 2023-07-16 | Stop reason: HOSPADM

## 2023-07-14 RX ORDER — DIGOXIN 125 MCG
0.12 TABLET ORAL DAILY
Status: DISCONTINUED | OUTPATIENT
Start: 2023-07-15 | End: 2023-07-16 | Stop reason: HOSPADM

## 2023-07-14 RX ORDER — ACETAMINOPHEN 325 MG/1
650 TABLET ORAL EVERY 4 HOURS PRN
Status: DISCONTINUED | OUTPATIENT
Start: 2023-07-14 | End: 2023-07-16 | Stop reason: HOSPADM

## 2023-07-14 RX ORDER — POLYETHYLENE GLYCOL 3350 17 G/17G
17 POWDER, FOR SOLUTION ORAL 2 TIMES DAILY PRN
Status: DISCONTINUED | OUTPATIENT
Start: 2023-07-14 | End: 2023-07-16 | Stop reason: HOSPADM

## 2023-07-14 RX ORDER — TALC
6 POWDER (GRAM) TOPICAL NIGHTLY PRN
Status: DISCONTINUED | OUTPATIENT
Start: 2023-07-14 | End: 2023-07-16 | Stop reason: HOSPADM

## 2023-07-14 RX ORDER — IBUPROFEN 200 MG
1 TABLET ORAL
Status: COMPLETED | OUTPATIENT
Start: 2023-07-14 | End: 2023-07-15

## 2023-07-14 RX ORDER — FLUTICASONE FUROATE AND VILANTEROL 100; 25 UG/1; UG/1
1 POWDER RESPIRATORY (INHALATION) DAILY
Status: DISCONTINUED | OUTPATIENT
Start: 2023-07-15 | End: 2023-07-16 | Stop reason: HOSPADM

## 2023-07-14 RX ADMIN — NICOTINE 1 PATCH: 21 PATCH, EXTENDED RELEASE TRANSDERMAL at 06:07

## 2023-07-14 RX ADMIN — HYDRALAZINE HYDROCHLORIDE 10 MG: 20 INJECTION INTRAMUSCULAR; INTRAVENOUS at 11:07

## 2023-07-14 RX ADMIN — IOPAMIDOL 100 ML: 755 INJECTION, SOLUTION INTRAVENOUS at 05:07

## 2023-07-14 RX ADMIN — PANTOPRAZOLE SODIUM 80 MG: 40 INJECTION, POWDER, FOR SOLUTION INTRAVENOUS at 06:07

## 2023-07-14 RX ADMIN — PANTOPRAZOLE SODIUM 8 MG/HR: 40 INJECTION, POWDER, FOR SOLUTION INTRAVENOUS at 06:07

## 2023-07-14 NOTE — ED PROVIDER NOTES
Encounter Date: 7/14/2023       History     Chief Complaint   Patient presents with    Melena     C/o black stools x1.5 weeks with diarrhea for past 3 days and abd pain onset today. Pt performed cologuard per PCP, which was positive. States vomited once 2 days ago.      62-year-old female complains of abdominal pain and dark stool.  It has been greater than a week that she has noticed her stools to be dark and feeling tired, and fatigued.  She did vomited twice today.  Her PCP perform a Cologuard and it was positive.        Abdominal Pain  The current episode started just prior to arrival. The problem has been gradually worsening. The abdominal pain is located in the LLQ and epigastric region. The abdominal pain does not radiate. The abdominal pain is relieved by nothing. The other symptoms of the illness include fatigue, nausea and diarrhea. The other symptoms of the illness do not include fever, jaundice, shortness of breath, dysuria or hematemesis.   The diarrhea began 2 days ago. The diarrhea is black and tarry.   Nausea began today.   The patient states that she believes she is currently not pregnant. The patient has had a change in bowel habit. Symptoms associated with the illness do not include back pain.   Review of patient's allergies indicates:  No Known Allergies  Past Medical History:   Diagnosis Date    COPD (chronic obstructive pulmonary disease)     GERD (gastroesophageal reflux disease)     Hyperlipidemia     Hypertension     Hypokalemia     Insomnia     Nicotine dependence     Osteoporosis      Past Surgical History:   Procedure Laterality Date    ADENOIDECTOMY      HYSTERECTOMY      TONSILLECTOMY       No family history on file.  Social History     Tobacco Use    Smoking status: Every Day     Packs/day: 1.00     Types: Cigarettes    Smokeless tobacco: Never   Substance Use Topics    Alcohol use: Yes     Alcohol/week: 2.0 standard drinks     Types: 2 Cans of beer per week    Drug use: Never      Review of Systems   Constitutional:  Positive for fatigue. Negative for fever.   HENT:  Negative for sore throat.    Respiratory:  Negative for shortness of breath.    Cardiovascular:  Negative for chest pain.   Gastrointestinal:  Positive for abdominal pain, diarrhea and nausea. Negative for hematemesis and jaundice.   Genitourinary:  Negative for dysuria.   Musculoskeletal:  Negative for back pain.   Skin:  Negative for rash.   Neurological:  Negative for weakness.   Hematological:  Does not bruise/bleed easily.     Physical Exam     Initial Vitals [07/14/23 1340]   BP Pulse Resp Temp SpO2   124/71 96 18 98.3 °F (36.8 °C) 95 %      MAP       --         Physical Exam    Vitals reviewed.  Constitutional: She appears well-developed and well-nourished.   Cardiovascular:  Normal rate.           Pulmonary/Chest: Breath sounds normal.   Abdominal: Abdomen is soft. Bowel sounds are normal. There is abdominal tenderness in the epigastric area and left lower quadrant. There is no guarding.     Neurological: She is alert and oriented to person, place, and time. She has normal strength.   Skin: Skin is warm and dry.   Psychiatric: Her behavior is normal. Judgment and thought content normal.       ED Course   Procedures  Labs Reviewed   CBC WITH DIFFERENTIAL - Abnormal; Notable for the following components:       Result Value    RBC 2.92 (*)     Hgb 9.0 (*)     Hct 26.5 (*)     All other components within normal limits   COMPREHENSIVE METABOLIC PANEL - Abnormal; Notable for the following components:    Blood Urea Nitrogen 9.2 (*)     All other components within normal limits   PROTIME-INR - Abnormal; Notable for the following components:    PT 12.4 (*)     All other components within normal limits   APTT - Normal   LIPASE - Normal   URINALYSIS, REFLEX TO URINE CULTURE - Normal   MAGNESIUM - Normal   URINALYSIS, MICROSCOPIC - Normal   TYPE & SCREEN   ABORH RETYPE          Imaging Results              CT Abdomen Pelvis W Wo  Contrast (Final result)  Result time 07/14/23 17:56:24   Procedure changed from CT Abdomen Pelvis With Contrast     Final result by Lily Whyte MD (07/14/23 17:56:24)                   Impression:      No evidence of active GI hemorrhage    Lung nodule in the right lower lobe which is stable since 2020    Multiple compression fractures in the lower thoracic and lumbar spine.  The chronicity of the L2 and T12 fracture is unknown.  The other fractures are old.      Electronically signed by: Lily Whyte  Date:    07/14/2023  Time:    17:56               Narrative:    EXAMINATION:  CT ABDOMEN PELVIS W WO CONTRAST    CLINICAL HISTORY:  GI bleed;    TECHNIQUE:  Low dose axial images, sagittal and coronal reformations were obtained from the lung bases to the pubic symphysis following the IV administration of  contrast.  Delayed imaging and pre contrasted imaging was performed as well. Automatic exposure control is utilized to reduce patient radiation exposure.    COMPARISON:  09/28/2018 and CT scan the chest from 02/12/2020    FINDINGS:  The lung bases are clear.  There is a lung nodule in the right lower lobe that measures 1 cm x 8 mm.  This was seen on the 2020 examination as well and appears unchanged.  The    The liver appears normal.  No liver mass or lesion is seen.  Portal and hepatic veins appear normal.    The gallbladder appears grossly unremarkable.    The pancreas appears normal.  No pancreatic mass or lesion is seen.    The spleen appears normal.  No splenic mass or lesion is seen.    The adrenal glands appear normal.  No adrenal nodule is seen.    The kidneys are normal in size.  No hydronephrosis is seen.  No hydroureter is seen.  No nephrolithiasis or ureteral stone is seen.  Cortical nephrogram phase of the examination shows no cortical renal abnormality and delayed pyelogram phase of the examination shows no filling defects within the renal pelvis or calices or the visualized portions of  the ureters.    Urinary bladder appears normal.    No colitis is seen.  No diverticulitis is seen.  No colonic mass or lesion or inflammatory process is seen.  No pooling of contrast is seen to suggest active GI hemorrhage.    No free air is seen.  No free fluid is seen.    Osteoporotic and degenerative changes seen in the bones.  There are compression fracture seen at the level of T12-L1 L2-L3 and L4.  The L1-L3 and L4 fractures were seen in 2008.  The chronicity of the L2 fracture and the T12 compression fracture is unknown.                                       Medications   pantoprazole (PROTONIX) 40 mg in sodium chloride 0.9 % 100 mL IVPB (MB+) (8 mg/hr Intravenous New Bag 7/14/23 1855)   nicotine 21 mg/24 hr 1 patch (1 patch Transdermal Patch Applied 7/14/23 1856)   iopamidoL (ISOVUE-370) injection 100 mL (100 mLs Intravenous Given 7/14/23 1741)   pantoprazole injection 80 mg (80 mg Intravenous Given 7/14/23 1855)     Medical Decision Making:   Initial Assessment:   62-year-old female complains of abdominal pain and dark stool.  It has been greater than a week that she has noticed her stools to be dark and feeling tired, and fatigued.  She did vomited twice today.  Her PCP perform a Cologuard and it was positive.  Differential Diagnosis:   Judging by the patient's chief complaint and pertinent history, the patient has the following possible differential diagnoses, including but not limited to the following.  Some of these are deemed to be lower likelihood and some more likely based on my physical exam and history combined with possible lab work and/or imaging studies.   Please see the pertinent studies, and refer to the HPI.  Some of these diagnoses will take further evaluation to fully rule out, perhaps as an outpatient and the patient was encouraged to follow up when discharged for more comprehensive evaluation.    appendicitis, biliary disease, diverticulitis,  AAA, ACS, mesenteric ischemia, intraabdominal  abcess, retroperitoneal abcess, gastritis, gastroenteritis, hepatitis, hernia, pancreatitis, inflammatory bowel disease, PUD, SBP, nephrolithiasis, DKA, sickle cell crisis, consitpation, GERD, IBS   Clinical Tests:   Lab Tests: Reviewed       <> Summary of Lab: Hemoglobin 07/06/23 14.6  Hemoglobin 9    Radiological Study: Reviewed  ED Management:  Reviewed lab work with patient patient's daughter patient voiced understanding, plan is to have patient admitted for higher level of care.  Other:   I discussed test(s) with the performing physician.       <> Summary of the Findings: Discussed with Dr.brooke Parker at ED. plan is to admit patient    I have discussed this case with another health care provider.       <> Summary of the Discussion: Discussed with hospitalist NP Luzmaria, Patient will be admitted and assigned to Dr. Alejandra Cervantes .   I spoke with Dr. Tracie GOTTI  on call, recommendations Protonix b.i.d., NPO at Midnight.           Attending Attestation:     Physician Attestation Statement for NP/PA:   I have directed and reviewed the workup performed by the PA/NP.  I performed the substantive portion of the medical decision making.     Other NP/PA Attestation Additions:    History of Present Illness: See ed course             ED Course as of 07/14/23 1944 Fri Jul 14, 2023 1837 Dr. Parker supervisory attestation  I performed substantive portion of MDM. I performed a history, physical exam, reviewed pertinent available previous records and discussed plan of care with NP I had face to face time evaluation of the patient    HPI:  62-year-old female presents to the ED for evaluation of several days of melena.  She has had frequent black stools for about 1 week with increased generalized weakness.  She denies any over-the-counter NSAID use but does take a baby aspirin daily.  She denies any spicy or acidic food or any abdominal discomfort.  Her PCP noemi labs early on in the course where hemoglobin  was normal and it has acutely dropped  PE:  Normocephalic, atraumatic  Regular rate, lungs clear to auscultation bilaterally  Abdomen is soft and nontender  Rectal exam performed by a nurse practitioner  MDM:  Upper GI bleed with a drop in her H&H.  Protonix bolus and infusion ordered and patient will be admitted to hospitalist   [BS]      ED Course User Index  [BS] Kelly Parker MD                 Clinical Impression:   Final diagnoses:  [K92.2] Gastrointestinal hemorrhage, unspecified gastrointestinal hemorrhage type (Primary)  [R10.9] Abdominal pain, unspecified abdominal location  [K92.2] GI bleeding        ED Disposition Condition    Admit Stable                FOREIGN Barajas  07/14/23 1910       Kelly Parker MD  07/14/23 6894

## 2023-07-14 NOTE — Clinical Note
Diagnosis: GI bleeding [694805]   Admitting Provider:: FREDY NOONAN [[id number]]   Future Attending Provider: FREDY NOONAN [[id number]]   Reason for IP Medical Treatment  (Clinical interventions that can only be accomplished in the IP setting? ) :: higher level of care   I certify that Inpatient services for greater than or equal to 2 midnights are medically necessary:: Yes   Plans for Post-Acute care--if anticipated (pick the single best option):: A. No post acute care anticipated at this time   Special Needs:: No Special Needs [1]

## 2023-07-14 NOTE — FIRST PROVIDER EVALUATION
"Medical screening examination initiated.  I have conducted a focused provider triage encounter, findings are as follows:    Brief history of present illness:  61 y/o female presents with dark stools intermittent since may then over 1.5 weeks blood noted and now diarrhea and abdominal pain.     Vitals:    07/14/23 1340   BP: 124/71   BP Location: Left arm   Patient Position: Sitting   Pulse: 96   Resp: 18   Temp: 98.3 °F (36.8 °C)   TempSrc: Oral   SpO2: 95%   Weight: 51.7 kg (114 lb)   Height: 5' 3" (1.6 m)       Pertinent physical exam:  alert, nonlabored, ambulatory     Brief workup plan:  labs, urine    Preliminary workup initiated; this workup will be continued and followed by the physician or advanced practice provider that is assigned to the patient when roomed.  "

## 2023-07-15 ENCOUNTER — ANESTHESIA EVENT (OUTPATIENT)
Dept: SURGERY | Facility: HOSPITAL | Age: 63
End: 2023-07-15
Payer: MEDICAID

## 2023-07-15 ENCOUNTER — ANESTHESIA (OUTPATIENT)
Dept: SURGERY | Facility: HOSPITAL | Age: 63
End: 2023-07-15
Payer: MEDICAID

## 2023-07-15 PROBLEM — R10.9 ABDOMINAL PAIN: Status: ACTIVE | Noted: 2023-07-15

## 2023-07-15 LAB
ANION GAP SERPL CALC-SCNC: 6 MEQ/L
BUN SERPL-MCNC: 6.5 MG/DL (ref 9.8–20.1)
CALCIUM SERPL-MCNC: 9.1 MG/DL (ref 8.4–10.2)
CHLORIDE SERPL-SCNC: 104 MMOL/L (ref 98–107)
CO2 SERPL-SCNC: 27 MMOL/L (ref 23–31)
CREAT SERPL-MCNC: 0.55 MG/DL (ref 0.55–1.02)
CREAT/UREA NIT SERPL: 12
ERYTHROCYTE [DISTWIDTH] IN BLOOD BY AUTOMATED COUNT: 12.7 % (ref 11.5–17)
FOLATE SERPL-MCNC: 51.6 NG/ML (ref 7–31.4)
GFR SERPLBLD CREATININE-BSD FMLA CKD-EPI: >60 MLS/MIN/1.73/M2
GLUCOSE SERPL-MCNC: 90 MG/DL (ref 82–115)
HCT VFR BLD AUTO: 26.3 % (ref 37–47)
HCT VFR BLD AUTO: 28.6 % (ref 37–47)
HGB BLD-MCNC: 8.9 G/DL (ref 12–16)
HGB BLD-MCNC: 9.7 G/DL (ref 12–16)
MCH RBC QN AUTO: 30.5 PG (ref 27–31)
MCHC RBC AUTO-ENTMCNC: 33.8 G/DL (ref 33–36)
MCV RBC AUTO: 90.1 FL (ref 80–94)
NRBC BLD AUTO-RTO: 0 %
PLATELET # BLD AUTO: 236 X10(3)/MCL (ref 130–400)
PMV BLD AUTO: 9.3 FL (ref 7.4–10.4)
POTASSIUM SERPL-SCNC: 3.9 MMOL/L (ref 3.5–5.1)
RBC # BLD AUTO: 2.92 X10(6)/MCL (ref 4.2–5.4)
SODIUM SERPL-SCNC: 137 MMOL/L (ref 136–145)
WBC # SPEC AUTO: 7.43 X10(3)/MCL (ref 4.5–11.5)

## 2023-07-15 PROCEDURE — 85014 HEMATOCRIT: CPT | Performed by: INTERNAL MEDICINE

## 2023-07-15 PROCEDURE — 88312 SPECIAL STAINS GROUP 1: CPT

## 2023-07-15 PROCEDURE — S4991 NICOTINE PATCH NONLEGEND: HCPCS | Performed by: INTERNAL MEDICINE

## 2023-07-15 PROCEDURE — 96375 TX/PRO/DX INJ NEW DRUG ADDON: CPT | Mod: 59

## 2023-07-15 PROCEDURE — D9220A PRA ANESTHESIA: ICD-10-PCS | Mod: ANES,,, | Performed by: ANESTHESIOLOGY

## 2023-07-15 PROCEDURE — 85027 COMPLETE CBC AUTOMATED: CPT | Performed by: INTERNAL MEDICINE

## 2023-07-15 PROCEDURE — 00731 ANES UPR GI NDSC PX NOS: CPT | Performed by: INTERNAL MEDICINE

## 2023-07-15 PROCEDURE — 11000001 HC ACUTE MED/SURG PRIVATE ROOM

## 2023-07-15 PROCEDURE — D9220A PRA ANESTHESIA: Mod: ANES,,, | Performed by: ANESTHESIOLOGY

## 2023-07-15 PROCEDURE — 43270 EGD LESION ABLATION: CPT | Mod: 59 | Performed by: INTERNAL MEDICINE

## 2023-07-15 PROCEDURE — 25000003 PHARM REV CODE 250: Performed by: INTERNAL MEDICINE

## 2023-07-15 PROCEDURE — C9113 INJ PANTOPRAZOLE SODIUM, VIA: HCPCS | Performed by: INTERNAL MEDICINE

## 2023-07-15 PROCEDURE — 80048 BASIC METABOLIC PNL TOTAL CA: CPT | Performed by: INTERNAL MEDICINE

## 2023-07-15 PROCEDURE — D9220A PRA ANESTHESIA: Mod: CRNA,,, | Performed by: NURSE ANESTHETIST, CERTIFIED REGISTERED

## 2023-07-15 PROCEDURE — 87338 HPYLORI STOOL AG IA: CPT | Performed by: INTERNAL MEDICINE

## 2023-07-15 PROCEDURE — 88305 TISSUE EXAM BY PATHOLOGIST: CPT | Performed by: INTERNAL MEDICINE

## 2023-07-15 PROCEDURE — G0378 HOSPITAL OBSERVATION PER HR: HCPCS

## 2023-07-15 PROCEDURE — 43239 EGD BIOPSY SINGLE/MULTIPLE: CPT | Mod: 59 | Performed by: INTERNAL MEDICINE

## 2023-07-15 PROCEDURE — 82746 ASSAY OF FOLIC ACID SERUM: CPT | Performed by: INTERNAL MEDICINE

## 2023-07-15 PROCEDURE — 63600175 PHARM REV CODE 636 W HCPCS: Performed by: NURSE ANESTHETIST, CERTIFIED REGISTERED

## 2023-07-15 PROCEDURE — 25000003 PHARM REV CODE 250: Performed by: NURSE ANESTHETIST, CERTIFIED REGISTERED

## 2023-07-15 PROCEDURE — 63600175 PHARM REV CODE 636 W HCPCS: Performed by: INTERNAL MEDICINE

## 2023-07-15 PROCEDURE — D9220A PRA ANESTHESIA: ICD-10-PCS | Mod: CRNA,,, | Performed by: NURSE ANESTHETIST, CERTIFIED REGISTERED

## 2023-07-15 PROCEDURE — 96374 THER/PROPH/DIAG INJ IV PUSH: CPT | Mod: 59

## 2023-07-15 PROCEDURE — 96367 TX/PROPH/DG ADDL SEQ IV INF: CPT | Mod: 59

## 2023-07-15 PROCEDURE — 27201423 OPTIME MED/SURG SUP & DEVICES STERILE SUPPLY: Performed by: INTERNAL MEDICINE

## 2023-07-15 PROCEDURE — 37000008 HC ANESTHESIA 1ST 15 MINUTES: Performed by: INTERNAL MEDICINE

## 2023-07-15 PROCEDURE — 88313 SPECIAL STAINS GROUP 2: CPT

## 2023-07-15 PROCEDURE — 37000009 HC ANESTHESIA EA ADD 15 MINS: Performed by: INTERNAL MEDICINE

## 2023-07-15 RX ORDER — SODIUM CHLORIDE, SODIUM GLUCONATE, SODIUM ACETATE, POTASSIUM CHLORIDE AND MAGNESIUM CHLORIDE 30; 37; 368; 526; 502 MG/100ML; MG/100ML; MG/100ML; MG/100ML; MG/100ML
INJECTION, SOLUTION INTRAVENOUS CONTINUOUS
Status: DISCONTINUED | OUTPATIENT
Start: 2023-07-15 | End: 2023-07-16 | Stop reason: HOSPADM

## 2023-07-15 RX ORDER — PROPOFOL 10 MG/ML
VIAL (ML) INTRAVENOUS
Status: DISCONTINUED | OUTPATIENT
Start: 2023-07-15 | End: 2023-07-15

## 2023-07-15 RX ORDER — LISINOPRIL 5 MG/1
5 TABLET ORAL DAILY
Status: DISCONTINUED | OUTPATIENT
Start: 2023-07-15 | End: 2023-07-16 | Stop reason: HOSPADM

## 2023-07-15 RX ORDER — LIDOCAINE HYDROCHLORIDE 20 MG/ML
INJECTION, SOLUTION EPIDURAL; INFILTRATION; INTRACAUDAL; PERINEURAL
Status: DISCONTINUED | OUTPATIENT
Start: 2023-07-15 | End: 2023-07-15

## 2023-07-15 RX ORDER — LIDOCAINE HYDROCHLORIDE 10 MG/ML
1 INJECTION, SOLUTION EPIDURAL; INFILTRATION; INTRACAUDAL; PERINEURAL ONCE
Status: DISCONTINUED | OUTPATIENT
Start: 2023-07-15 | End: 2023-07-15

## 2023-07-15 RX ORDER — SODIUM CHLORIDE 9 MG/ML
INJECTION, SOLUTION INTRAVENOUS CONTINUOUS
Status: DISCONTINUED | OUTPATIENT
Start: 2023-07-15 | End: 2023-07-16 | Stop reason: HOSPADM

## 2023-07-15 RX ADMIN — DIGOXIN 0.12 MG: 125 TABLET ORAL at 09:07

## 2023-07-15 RX ADMIN — SODIUM CHLORIDE, SODIUM GLUCONATE, SODIUM ACETATE, POTASSIUM CHLORIDE AND MAGNESIUM CHLORIDE: 526; 502; 368; 37; 30 INJECTION, SOLUTION INTRAVENOUS at 01:07

## 2023-07-15 RX ADMIN — PANTOPRAZOLE SODIUM 40 MG: 40 INJECTION, POWDER, FOR SOLUTION INTRAVENOUS at 08:07

## 2023-07-15 RX ADMIN — PROPOFOL 120 MG: 10 INJECTION, EMULSION INTRAVENOUS at 01:07

## 2023-07-15 RX ADMIN — ATORVASTATIN CALCIUM 10 MG: 10 TABLET, FILM COATED ORAL at 09:07

## 2023-07-15 RX ADMIN — PROPOFOL 80 MG: 10 INJECTION, EMULSION INTRAVENOUS at 01:07

## 2023-07-15 RX ADMIN — NICOTINE 1 PATCH: 21 PATCH, EXTENDED RELEASE TRANSDERMAL at 09:07

## 2023-07-15 RX ADMIN — LISINOPRIL 5 MG: 5 TABLET ORAL at 09:07

## 2023-07-15 RX ADMIN — PANTOPRAZOLE SODIUM 40 MG: 40 INJECTION, POWDER, FOR SOLUTION INTRAVENOUS at 09:07

## 2023-07-15 RX ADMIN — SODIUM CHLORIDE 125 MG: 9 INJECTION, SOLUTION INTRAVENOUS at 10:07

## 2023-07-15 RX ADMIN — LIDOCAINE HYDROCHLORIDE 60 MG: 20 INJECTION, SOLUTION EPIDURAL; INFILTRATION; INTRACAUDAL; PERINEURAL at 01:07

## 2023-07-15 RX ADMIN — TRAZODONE HYDROCHLORIDE 50 MG: 50 TABLET ORAL at 08:07

## 2023-07-15 NOTE — OP NOTE
EGD Report    Referring Physician: Lawrence    Date of procedure: 07/15/2023     Surgeon: Tyree Hodges    ASA: 2    Medications: Per anesthesia    Indication: melena    Procedure: EGD biopsy, cautery    Description of the Procedure: The patient was brought back to the endoscopy suite where the risks, benefits, and alternatives of the procedure were described in detail. The patient was given the opportunity to ask questions and then signed informed consent. Patient was positioned in the left lateral decubitus position, continuous monitoring was initiated, and supplemental oxygen was provided via nasal cannula. Bite block was placed. Adequate sedation was achieved with the above mentioned medications as documented in chart and then titrated during the entire procedure. Under direct visualization the gastroscope was introduced through the oropharynx into the esophagus. The scope was advanced into the stomach and to the second portion of the duodenum. Scope was withdrawn and the mucosa was carefully examined. The entire gastric mucosa was examined, including the fundus with retroflexion. Air was evacuated from the stomach and the scope was withdrawn into the esophagus. The entire esophageal mucosa was examined. The procedure was completed. The patient tolerated the procedure well and was transferred to the recovery area in stable condition.     Estimated Blood Loss: minimal    Complications: none    Findings:  Multiple antral ulcers, one with dark red spot, visible vessel?biopsy adjacent, gold probe cautery. Duodenitis    Impression and Recommendations:   Gastric ulcers, status post cautery and biopsy for NURIAyleliel Hodges

## 2023-07-15 NOTE — NURSING
Nurses Note -- 4 Eyes      7/15/2023   12:11 AM      Skin assessed during: Admit      [x] No Altered Skin Integrity Present    [x]Prevention Measures Documented      [] Yes- Altered Skin Integrity Present or Discovered   [] LDA Added if Not in Epic (Describe Wound)   [] New Altered Skin Integrity was Present on Admit and Documented in LDA   [] Wound Image Taken    Wound Care Consulted? No    Attending Nurse:  Magda Gresham RN     Second RN/Staff Member:  Armando Hernandez RN

## 2023-07-15 NOTE — ANESTHESIA POSTPROCEDURE EVALUATION
Anesthesia Post Evaluation    Patient: Antonina Jaimes    Procedure(s) Performed: Procedure(s) (LRB):  EGD (ESOPHAGOGASTRODUODENOSCOPY) (N/A)  EGD, WITH CLOSED BIOPSY  EGD,WITH HEMORRHAGE CONTROL    Final Anesthesia Type: general      Patient location during evaluation: PACU  Patient participation: Yes- Able to Participate  Level of consciousness: awake and alert and oriented  Post-procedure vital signs: reviewed and stable  Pain management: adequate  Airway patency: patent    PONV status at discharge: No PONV  Anesthetic complications: no      Cardiovascular status: hemodynamically stable  Respiratory status: unassisted  Hydration status: euvolemic  Follow-up not needed.          Vitals Value Taken Time   /72 07/15/23 1404   Temp 36.3 °C (97.3 °F) 07/15/23 1349   Pulse 94 07/15/23 1404   Resp 17 07/15/23 1404   SpO2 100 % 07/15/23 1404         No case tracking events are documented in the log.      Pain/Priscila Score: Priscila Score: 8 (7/15/2023  1:49 PM)

## 2023-07-15 NOTE — H&P
[facility] Hospital Medicine - H&P Note    Patient Name: Antonina Jaimes  : 1960  MRN: [id number]  PCP: Donis Mcneil MD  Admitting Physician: Gerber Bravo MD  Admission Class: IP- Inpatient   Length of Stay: 0  Face-to-Face encounter date: 2023  Code status: Full    Chief Complaint   Melena    History of Present Illness   This is a 62-year-old female medical history of chronic active tobacco smoker, COPD not on home oxygen, HTN, HLD, persistent sinus tachycardia, present to the ED on 2023 with chief complaint of black stool melena for about 10 days.    Report initially she noted black stool 2 weeks ago, was formed stool about 1-2 bowel movements per day until earlier this week where she started having watery black stool more than 5-6 bowel movements per day for straight 3 days until today which slowed down and only had 1 bowel movement.  Report upper abdominal pain and nausea and 1 episode of nonbloody vomiting.  Denies fever or chills.  Recently followed up with her PCP and had a routine screening with positive Cologuard on 2023.  Report intermittent episode of black stool over the years.  She is not currently taking NSAID though she has used it earlier this year in January for compression vertebral fractures.  She takes aspirin 81 mg daily, no anticoagulants.  Does not recall having prior EGD.  Last colonoscopy in 2016 with Dr. Peter and report polyps removed.    On arrival to ED she was afebrile and hemodynamically stable.  Labs notable for hemoglobin 9.0 (was 14.6 on 2023), BUN 9.2.  She was started on IV Protonix, GI consulted and referred to hospital medicine service for further evaluation and management.    Upon my evaluation she is resting comfortably, denies any further bowel movements with last bowel movement being this morning.      Repeat hemoglobin 9.7.    ROS   Except as documented, all other systems reviewed and negative      Past Medical History   COPD  HTN  HLD  Osteoporosis  Insomnia    Past Surgical History   Colonoscopy 2016 with Dr. Peter  Appendectomy  Hysterectomy  Tonsillectomy    Social History     Social History     Tobacco Use    Smoking status: Every Day     Packs/day: 1.00     Types: Cigarettes    Smokeless tobacco: Never   Substance Use Topics    Alcohol use: Yes     Alcohol/week: 2.0 standard drinks     Types: 2 Cans of beer per week        Family History   Reviewed and negative    Allergies   Patient has no known allergies.    Home Medications     Prior to Admission medications    Medication Sig Start Date End Date Taking? Authorizing Provider   albuterol-ipratropium (DUO-NEB) 2.5 mg-0.5 mg/3 mL nebulizer solution Inhale into the lungs. 11/15/21   Historical Provider   aspirin (ECOTRIN) 81 MG EC tablet Take 81 mg by mouth.    Historical Provider   digoxin (LANOXIN) 125 mcg tablet TAKE 1 TABLET BY MOUTH EVERY DAY 5/30/23   Donis Mcneil MD   enalapril (VASOTEC) 5 MG tablet TAKE 1/2 TABLET BY MOUTH EVERY DAY 3/28/23   Donis Mcneil MD   fluticasone-salmeterol diskus inhaler 250-50 mcg Inhale 1 puff into the lungs.    Historical Provider   hydroCHLOROthiazide (HYDRODIURIL) 25 MG tablet Take 25 mg by mouth.    Historical Provider   KLOR-CON 10 10 mEq TbSR TAKE 1 TABLET BY MOUTH EVERY DAY 8/12/22   Donis Mcneil MD   simvastatin (ZOCOR) 20 MG tablet TAKE 1 TABLET BY MOUTH EVERYDAY AT BEDTIME 3/15/23   Donis Mcneil MD   traZODone (DESYREL) 50 MG tablet TAKIE 1/2 TABLET BY MOUTH AT BEDTIME 9/12/22   Donis Mcneil MD        Physical Exam   Vital Signs  Temp:  [98.1 °F (36.7 °C)-98.3 °F (36.8 °C)]   Pulse:  [79-96]   Resp:  [16-20]   BP: (124-163)/(71-94)   SpO2:  [95 %-100 %]    General: Appears comfortable  HEENT: NC/AT  Neck:  No JVD  Chest: CTABL  CVS: Regular rhythm. Normal S1/S2.  Abdomen: nondistended, normoactive BS, soft and non-tender.  MSK: No obvious deformity or joint swelling  Skin: Warm  and dry  Neuro: AAOx3, no focal neurological deficit  Psych: Cooperative    Labs     Recent Labs     07/14/23  1347   WBC 7.23   RBC 2.92*   HGB 9.0*   HCT 26.5*   MCV 90.8   MCH 30.8   MCHC 34.0   RDW 12.6        Recent Labs     07/14/23  1347   PROTIME 12.4*   INR 0.93   PTT 28.8      Recent Labs     07/14/23  1347      K 4.1   CHLORIDE 103   CO2 27   BUN 9.2*   CREATININE 0.70   EGFRNORACEVR >60   GLUCOSE 86   CALCIUM 9.2   MG 1.90   ALBUMIN 4.2   GLOBULIN 2.5   ALKPHOS 51   ALT 14   AST 21   BILITOT 0.3   LIPASE 34        Microbiology Results (last 7 days)       ** No results found for the last 168 hours. **           Imaging     CT Abdomen Pelvis W Wo Contrast   Final Result      No evidence of active GI hemorrhage      Lung nodule in the right lower lobe which is stable since 2020      Multiple compression fractures in the lower thoracic and lumbar spine.  The chronicity of the L2 and T12 fracture is unknown.  The other fractures are old.         Electronically signed by: Lily Whyte   Date:    07/14/2023   Time:    17:56        Assessment & Plan   Acute GI bleed-melena  Acute blood loss anemia    HX COPD not on home oxygen, chronic tobacco smoker, HTN, HLD, insomnia    Plan:    Clear liquid diet, NPO after midnight except medications  Monitor H&H transfuse if less than 8 or hemodynamically unstable  Protonix 40 mg IV q12h  Ferrlecit 125 mg daily for 3 doses  Check H pylori stool antigen  GI consulted  Hold aspirin and HCTZ  Resume ACE-I, digoxin, statin, trazodone and LABA/ICS INH  VTE Prophylaxis: SCDs     Critical care time:  35 minutes  Critical care diagnosis:  Acute GI bleed with blood-loss anemia    Gerber Bravo MD  Internal Medicine

## 2023-07-15 NOTE — PROGRESS NOTES
[facility] Hospital Medicine Progress Note        Chief Complaint: Inpatient Follow-up for     HPI: 62-year-old female medical history of chronic active tobacco smoker, COPD not on home oxygen, HTN, HLD, persistent sinus tachycardia, present to the ED on 07/14/2023 with chief complaint of black stool melena for about 10 days.     Report initially she noted black stool 2 weeks ago, was formed stool about 1-2 bowel movements per day until earlier this week where she started having watery black stool more than 5-6 bowel movements per day for straight 3 days until today which slowed down and only had 1 bowel movement.  Report upper abdominal pain and nausea and 1 episode of nonbloody vomiting.  Denies fever or chills.  Recently followed up with her PCP and had a routine screening with positive Cologuard on 06/20/2023.  Report intermittent episode of black stool over the years.  She is not currently taking NSAID though she has used it earlier this year in January for compression vertebral fractures.  She takes aspirin 81 mg daily, no anticoagulants.  Does not recall having prior EGD.  Last colonoscopy in 2016 with Dr. Peter and report polyps removed.     On arrival to ED she was afebrile and hemodynamically stable.  Labs notable for hemoglobin 9.0 (was 14.6 on 07/06/2023), BUN 9.2.  She was started on IV Protonix, GI consulted and referred to hospital medicine service for further evaluation and management.     Upon my evaluation she is resting comfortably, denies any further bowel movements with last bowel movement being this morning.     Interval Hx:     Patient seen and examined this morning NPO denies any complaints  Case was discussed with patient's nurse and  on the floor.    Objective/physical exam:  General: In no acute distress, afebrile  Chest: Clear to auscultation bilaterally  Heart: RRR, +S1, S2, no appreciable murmur  Abdomen: Soft, nontender, BS +  MSK: Warm, no  lower extremity edema, no clubbing or cyanosis  Neurologic: Alert and oriented x4,   VITAL SIGNS: 24 HRS MIN & MAX LAST   Temp  Min: 97.6 °F (36.4 °C)  Max: 98.3 °F (36.8 °C) 97.6 °F (36.4 °C)   BP  Min: 121/77  Max: 163/80 (!) 144/75   Pulse  Min: 77  Max: 96  77   Resp  Min: 16  Max: 20 18   SpO2  Min: 95 %  Max: 100 % 99 %     I have reviewed the following labs:    Recent Labs   Lab 07/14/23  1347 07/15/23  0006 07/15/23  0520   WBC 7.23  --  7.43   RBC 2.92*  --  2.92*   HGB 9.0* 9.7* 8.9*   HCT 26.5* 28.6* 26.3*   MCV 90.8  --  90.1   MCH 30.8  --  30.5   MCHC 34.0  --  33.8   RDW 12.6  --  12.7     --  236   MPV 9.3  --  9.3       Recent Labs   Lab 07/14/23  1347 07/15/23  0520    137   K 4.1 3.9   CO2 27 27   BUN 9.2* 6.5*   CREATININE 0.70 0.55   CALCIUM 9.2 9.1   MG 1.90  --    ALBUMIN 4.2  --    ALKPHOS 51  --    ALT 14  --    AST 21  --    BILITOT 0.3  --           Microbiology Results (last 7 days)       ** No results found for the last 168 hours. **             See below for Radiology    Scheduled Med:   atorvastatin  10 mg Oral Daily    digoxin  0.125 mg Oral Daily    ferric gluconate (FERRLECIT) IVPB  125 mg Intravenous Daily    fluticasone furoate-vilanteroL  1 puff Inhalation Daily    lisinopriL  5 mg Oral Daily    nicotine  1 patch Transdermal ED 1 Time    nicotine  1 patch Transdermal Daily    pantoprazole  40 mg Intravenous Q12H    traZODone  50 mg Oral QHS        Continuous Infusions:       PRN Meds:  acetaminophen, acetaminophen, albuterol-ipratropium, aluminum-magnesium hydroxide-simethicone, hydrALAZINE, melatonin, ondansetron, polyethylene glycol, prochlorperazine, senna-docusate 8.6-50 mg, sodium chloride 0.9%       Assessment/Plan:  Melena   GI bleed  Acute blood loss anemia  History of COPD not on oxygen  Chronic tobacco use   Essential hypertension  Hyperlipidemia  Insomnia    Patient is NPO continue with Protonix b.i.d. GI consulted possible scope today?  Hemoglobin is 8.9  will keep a close watch  Other home medications have been resumed    Prophylaxis:  SCD    VTE prophylaxis:     Patient condition:  Stable/Fair/Guarded/ Serious/ Critical    Anticipated discharge and Disposition:         All diagnosis and differential diagnosis have been reviewed; assessment and plan has been documented; I have personally reviewed the labs and test results that are presently available; I have reviewed the patients medication list; I have reviewed the consulting providers response and recommendations. I have reviewed or attempted to review medical records based upon their availability    All of the patient's questions have been  addressed and answered. Patient's is agreeable to the above stated plan. I will continue to monitor closely and make adjustments to medical management as needed.  _____________________________________________________________________    Nutrition Status:    Radiology:  I have personally reviewed the following imaging and agree with the radiologist.     CT Abdomen Pelvis W Wo Contrast  Narrative: EXAMINATION:  CT ABDOMEN PELVIS W WO CONTRAST    CLINICAL HISTORY:  GI bleed;    TECHNIQUE:  Low dose axial images, sagittal and coronal reformations were obtained from the lung bases to the pubic symphysis following the IV administration of  contrast.  Delayed imaging and pre contrasted imaging was performed as well. Automatic exposure control is utilized to reduce patient radiation exposure.    COMPARISON:  09/28/2018 and CT scan the chest from 02/12/2020    FINDINGS:  The lung bases are clear.  There is a lung nodule in the right lower lobe that measures 1 cm x 8 mm.  This was seen on the 2020 examination as well and appears unchanged.  The    The liver appears normal.  No liver mass or lesion is seen.  Portal and hepatic veins appear normal.    The gallbladder appears grossly unremarkable.    The pancreas appears normal.  No pancreatic mass or lesion is seen.    The spleen appears  normal.  No splenic mass or lesion is seen.    The adrenal glands appear normal.  No adrenal nodule is seen.    The kidneys are normal in size.  No hydronephrosis is seen.  No hydroureter is seen.  No nephrolithiasis or ureteral stone is seen.  Cortical nephrogram phase of the examination shows no cortical renal abnormality and delayed pyelogram phase of the examination shows no filling defects within the renal pelvis or calices or the visualized portions of the ureters.    Urinary bladder appears normal.    No colitis is seen.  No diverticulitis is seen.  No colonic mass or lesion or inflammatory process is seen.  No pooling of contrast is seen to suggest active GI hemorrhage.    No free air is seen.  No free fluid is seen.    Osteoporotic and degenerative changes seen in the bones.  There are compression fracture seen at the level of T12-L1 L2-L3 and L4.  The L1-L3 and L4 fractures were seen in 2008.  The chronicity of the L2 fracture and the T12 compression fracture is unknown.  Impression: No evidence of active GI hemorrhage    Lung nodule in the right lower lobe which is stable since 2020    Multiple compression fractures in the lower thoracic and lumbar spine.  The chronicity of the L2 and T12 fracture is unknown.  The other fractures are old.    Electronically signed by: Lily Whyte  Date:    07/14/2023  Time:    17:56      Soco Bhat MD   07/15/2023

## 2023-07-15 NOTE — TRANSFER OF CARE
"Anesthesia Transfer of Care Note    Patient: Antonina Jaimes    Procedure(s) Performed: Procedure(s) (LRB):  EGD (ESOPHAGOGASTRODUODENOSCOPY) (N/A)  EGD, WITH CLOSED BIOPSY    Patient location: PACU    Anesthesia Type: general and MAC    Transport from OR: Transported from OR on room air with adequate spontaneous ventilation    Post pain: adequate analgesia    Post assessment: no apparent anesthetic complications    Post vital signs: stable    Level of consciousness: awake and alert    Nausea/Vomiting: no nausea/vomiting    Complications: none    Transfer of care protocol was followed      Last vitals:   Visit Vitals  BP (!) 90/54 (BP Location: Right arm, Patient Position: Lying)   Pulse 83   Temp 36.8 °C (98.2 °F) (Skin)   Resp 12   Ht 5' 3" (1.6 m)   Wt 51.7 kg (114 lb)   SpO2 99%   Breastfeeding No   BMI 20.19 kg/m²     "

## 2023-07-15 NOTE — CONSULTS
Gastroenterology Consultation Note    Reason for Consult:  The primary encounter diagnosis was Gastrointestinal hemorrhage, unspecified gastrointestinal hemorrhage type. Diagnoses of Abdominal pain, unspecified abdominal location, GI bleeding, and Chest pain were also pertinent to this visit.    PCP:   Donis Mcneil MD    Referring MD:  Aaareferral Self  No address on file    Hospital Day: 1     Initial History of Present Illness (HPI):  This is a 62 y.o. female who typically sees Dr. Donis Mcneil in [place].  She has had colonoscopies in the past, some 10 years ago with Mikhail Hoffman in [place], and then in 2016 with Dr. Peter.  She believes she had polyps.  Despite that, it sounds as though she just recently had a Cologuard performed, positive.      She presents to the hospital now with melena of several days duration.  She takes an 81 mg aspirin as a routine for cardiovascular concerns.  She does have a history of hypertension and congestive heart failure and sees Dr. Brooks Hannon.      She has had pyrosis of late, and has tried baking soda.  She does not take nonsteroidals for the most part, and she is not routinely on acid suppression.    She is had a bit of nausea without vomiting.  She denies significant weight loss.  She denies dysphagia.      She does have underlying COPD and continues to smoke.  She is not on home oxygen.      Her past medical history is remarkable for a hysterectomy.  She has never had an upper endoscopy.      She does drink a 6 pack at a time, but only once or twice a week.      She is  with healthy children.    ROS:  Review of Systems   Constitutional:  Negative for chills, fever, malaise/fatigue and weight loss.   Respiratory:  Negative for cough and shortness of breath.    Cardiovascular:  Negative for chest pain and PND.   Gastrointestinal:  Positive for diarrhea, heartburn, melena and nausea. Negative for abdominal pain and vomiting.   Genitourinary:  Negative  "for hematuria.   Psychiatric/Behavioral:  Negative for depression.      Medical History:   Past Medical History:   Diagnosis Date    COPD (chronic obstructive pulmonary disease)     GERD (gastroesophageal reflux disease)     Hyperlipidemia     Hypertension     Hypokalemia     Insomnia     Nicotine dependence     Osteoporosis        Surgical History:   Past Surgical History:   Procedure Laterality Date    ADENOIDECTOMY      HYSTERECTOMY      TONSILLECTOMY         Family History:   No family history on file..     Social History:   Social History     Tobacco Use    Smoking status: Every Day     Packs/day: 1.00     Types: Cigarettes    Smokeless tobacco: Never   Substance Use Topics    Alcohol use: Yes     Alcohol/week: 2.0 standard drinks     Types: 2 Cans of beer per week       Allergies:  Review of patient's allergies indicates:  No Known Allergies    Medications Prior to Admission   Medication Sig Dispense Refill Last Dose    albuterol-ipratropium (DUO-NEB) 2.5 mg-0.5 mg/3 mL nebulizer solution Inhale into the lungs.   7/14/2023    aspirin (ECOTRIN) 81 MG EC tablet Take 81 mg by mouth.   7/14/2023    digoxin (LANOXIN) 125 mcg tablet TAKE 1 TABLET BY MOUTH EVERY DAY 30 tablet 3 7/14/2023    enalapril (VASOTEC) 5 MG tablet TAKE 1/2 TABLET BY MOUTH EVERY DAY 15 tablet 3 7/14/2023    hydroCHLOROthiazide (HYDRODIURIL) 25 MG tablet Take 25 mg by mouth.   7/14/2023    KLOR-CON 10 10 mEq TbSR TAKE 1 TABLET BY MOUTH EVERY DAY 30 tablet 3 7/14/2023    simvastatin (ZOCOR) 20 MG tablet TAKE 1 TABLET BY MOUTH EVERYDAY AT BEDTIME 30 tablet 3 7/14/2023    traZODone (DESYREL) 50 MG tablet TAKIE 1/2 TABLET BY MOUTH AT BEDTIME 15 tablet 3 Past Week    [DISCONTINUED] fluticasone-salmeterol diskus inhaler 250-50 mcg Inhale 1 puff into the lungs.            Objective Findings:    Vital Signs:  BP (!) 144/75   Pulse 77   Temp 97.6 °F (36.4 °C) (Oral)   Resp 18   Ht 5' 3" (1.6 m)   Wt 51.7 kg (114 lb)   SpO2 99%   Breastfeeding No  "  BMI 20.19 kg/m²   Body mass index is 20.19 kg/m².    Physical Exam:  Physical Exam  Constitutional:       Appearance: Normal appearance.   HENT:      Head: Normocephalic and atraumatic.      Nose: Nose normal.      Mouth/Throat:      Mouth: Mucous membranes are moist.   Eyes:      Extraocular Movements: Extraocular movements intact.      Pupils: Pupils are equal, round, and reactive to light.   Cardiovascular:      Pulses: Normal pulses.      Heart sounds: Normal heart sounds.   Pulmonary:      Effort: Pulmonary effort is normal.      Breath sounds: Normal breath sounds.   Abdominal:      General: Abdomen is flat.      Palpations: Abdomen is soft.      Tenderness: There is no abdominal tenderness.   Musculoskeletal:         General: Normal range of motion.      Cervical back: Neck supple.      Right lower leg: No edema.      Left lower leg: No edema.   Neurological:      General: No focal deficit present.      Mental Status: She is alert and oriented to person, place, and time.   Psychiatric:         Mood and Affect: Mood normal.         Behavior: Behavior normal.       Labs:  Recent Results (from the past 48 hour(s))   CBC with Differential    Collection Time: 07/14/23  1:47 PM   Result Value Ref Range    WBC 7.23 4.50 - 11.50 x10(3)/mcL    RBC 2.92 (L) 4.20 - 5.40 x10(6)/mcL    Hgb 9.0 (L) 12.0 - 16.0 g/dL    Hct 26.5 (L) 37.0 - 47.0 %    MCV 90.8 80.0 - 94.0 fL    MCH 30.8 27.0 - 31.0 pg    MCHC 34.0 33.0 - 36.0 g/dL    RDW 12.6 11.5 - 17.0 %    Platelet 248 130 - 400 x10(3)/mcL    MPV 9.3 7.4 - 10.4 fL    Neut % 64.7 %    Lymph % 25.6 %    Mono % 6.9 %    Eos % 1.9 %    Basophil % 0.6 %    Lymph # 1.85 0.6 - 4.6 x10(3)/mcL    Neut # 4.68 2.1 - 9.2 x10(3)/mcL    Mono # 0.50 0.1 - 1.3 x10(3)/mcL    Eos # 0.14 0 - 0.9 x10(3)/mcL    Baso # 0.04 <=0.2 x10(3)/mcL    IG# 0.02 0 - 0.04 x10(3)/mcL    IG% 0.3 %    NRBC% 0.0 %   Comprehensive Metabolic Panel    Collection Time: 07/14/23  1:47 PM   Result Value Ref Range     Sodium Level 136 136 - 145 mmol/L    Potassium Level 4.1 3.5 - 5.1 mmol/L    Chloride 103 98 - 107 mmol/L    Carbon Dioxide 27 23 - 31 mmol/L    Glucose Level 86 82 - 115 mg/dL    Blood Urea Nitrogen 9.2 (L) 9.8 - 20.1 mg/dL    Creatinine 0.70 0.55 - 1.02 mg/dL    Calcium Level Total 9.2 8.4 - 10.2 mg/dL    Protein Total 6.7 5.8 - 7.6 gm/dL    Albumin Level 4.2 3.4 - 4.8 g/dL    Globulin 2.5 2.4 - 3.5 gm/dL    Albumin/Globulin Ratio 1.7 1.1 - 2.0 ratio    Bilirubin Total 0.3 <=1.5 mg/dL    Alkaline Phosphatase 51 40 - 150 unit/L    Alanine Aminotransferase 14 0 - 55 unit/L    Aspartate Aminotransferase 21 5 - 34 unit/L    eGFR >60 mls/min/1.73/m2   Protime-INR    Collection Time: 07/14/23  1:47 PM   Result Value Ref Range    PT 12.4 (L) 12.5 - 14.5 seconds    INR 0.93 0.00 - 1.30   APTT    Collection Time: 07/14/23  1:47 PM   Result Value Ref Range    PTT 28.8 23.2 - 33.7 seconds   Lipase    Collection Time: 07/14/23  1:47 PM   Result Value Ref Range    Lipase Level 34 <=60 U/L   Magnesium    Collection Time: 07/14/23  1:47 PM   Result Value Ref Range    Magnesium Level 1.90 1.60 - 2.60 mg/dL   Iron and TIBC    Collection Time: 07/14/23  1:47 PM   Result Value Ref Range    Iron Binding Capacity Unsaturated 224 70 - 310 ug/dL    Iron Level 59 50 - 170 ug/dL    Transferrin 258 173 - 360 mg/dL    Iron Binding Capacity Total 283 250 - 450 ug/dL    Iron Saturation 21 20 - 50 %   Ferritin    Collection Time: 07/14/23  1:47 PM   Result Value Ref Range    Ferritin Level 46.35 4.63 - 204.00 ng/mL   Vitamin B12    Collection Time: 07/14/23  1:47 PM   Result Value Ref Range    Vitamin B12 Level 456 213 - 816 pg/mL   Type & Screen    Collection Time: 07/14/23  2:15 PM   Result Value Ref Range    Group & Rh O POS     Indirect Shannon GEL NEG     Specimen Outdate 07/17/2023 23:59    Urinalysis, Reflex to Urine Culture    Collection Time: 07/14/23  2:15 PM    Specimen: Urine   Result Value Ref Range    Color, UA Yellow Yellow,  Light-Yellow, Dark Yellow, Faustina, Straw    Appearance, UA Clear Clear    Specific Gravity, UA 1.016 1.005 - 1.030    pH, UA 6.0 5.0 - 8.5    Protein, UA Negative Negative    Glucose, UA Negative Negative, Normal    Ketones, UA Negative Negative    Blood, UA Negative Negative    Bilirubin, UA Negative Negative    Urobilinogen, UA 1.0 0.2, 1.0, Normal    Nitrites, UA Negative Negative    Leukocyte Esterase, UA Negative Negative   Urinalysis, Microscopic    Collection Time: 07/14/23  2:15 PM   Result Value Ref Range    RBC, UA <5 <=5 /HPF    WBC, UA <5 <=5 /HPF    Squamous Epithelial Cells, UA <5 <=5 /HPF    Bacteria, UA None Seen None Seen, Rare, Occasional /HPF   ABORH RETYPE    Collection Time: 07/14/23  3:24 PM   Result Value Ref Range    ABORH Retype O POS    Hemoglobin and Hematocrit    Collection Time: 07/15/23 12:06 AM   Result Value Ref Range    Hgb 9.7 (L) 12.0 - 16.0 g/dL    Hct 28.6 (L) 37.0 - 47.0 %   Folate    Collection Time: 07/15/23  5:20 AM   Result Value Ref Range    Folate Level 51.6 (H) 7.0 - 31.4 ng/mL   CBC Without Differential    Collection Time: 07/15/23  5:20 AM   Result Value Ref Range    WBC 7.43 4.50 - 11.50 x10(3)/mcL    RBC 2.92 (L) 4.20 - 5.40 x10(6)/mcL    Hgb 8.9 (L) 12.0 - 16.0 g/dL    Hct 26.3 (L) 37.0 - 47.0 %    MCV 90.1 80.0 - 94.0 fL    MCH 30.5 27.0 - 31.0 pg    MCHC 33.8 33.0 - 36.0 g/dL    RDW 12.7 11.5 - 17.0 %    Platelet 236 130 - 400 x10(3)/mcL    MPV 9.3 7.4 - 10.4 fL    NRBC% 0.0 %   Basic Metabolic Panel    Collection Time: 07/15/23  5:20 AM   Result Value Ref Range    Sodium Level 137 136 - 145 mmol/L    Potassium Level 3.9 3.5 - 5.1 mmol/L    Chloride 104 98 - 107 mmol/L    Carbon Dioxide 27 23 - 31 mmol/L    Glucose Level 90 82 - 115 mg/dL    Blood Urea Nitrogen 6.5 (L) 9.8 - 20.1 mg/dL    Creatinine 0.55 0.55 - 1.02 mg/dL    BUN/Creatinine Ratio 12     Calcium Level Total 9.1 8.4 - 10.2 mg/dL    Anion Gap 6.0 mEq/L    eGFR >60 mls/min/1.73/m2       CT Abdomen Pelvis  W Wo Contrast   Final Result      No evidence of active GI hemorrhage      Lung nodule in the right lower lobe which is stable since 2020      Multiple compression fractures in the lower thoracic and lumbar spine.  The chronicity of the L2 and T12 fracture is unknown.  The other fractures are old.         Electronically signed by: Lily Whyte   Date:    07/14/2023   Time:    17:56      The patient's hemoglobin is low but stable at 8.9 with a normal white count MCV and platelet count.  Iron stores seem reasonable robust folate, normal B12.  Her electrolytes reasonable her liver tests normal renal parameters reassuring    Imaging:  The patient did have a CT scan of the abdomen and pelvis with no evidence of active GI hemorrhage.  A lung nodule in the right lower lobe is stable since 2020 and she has multiple chronic compression fractures  Endoscopy:        Assessment/Plan:  62-year-old with COPD takes a daily aspirin and presents with melena and anemia.  Apparently a history of polyps and a recent positive Cologuard.  Upper endoscopy seems a prudent endeavor.  She will need her colon evaluated down the line given this Cologuard.  Of course, she is not a Cologuard candidate    Thank you for allowing us to participate in the care of Antonina Jaimes.    Tyree Hodges MD

## 2023-07-15 NOTE — NURSING
Patient received to room # 950 via transport chair. Placed in bed and positioned for comfort. Oriented patient to room and placed on tele monitor.

## 2023-07-15 NOTE — ANESTHESIA PREPROCEDURE EVALUATION
07/15/2023  Antonina Jaimes is a 62 y.o., female.    Pre-op Diagnosis: Melena    Procedure(s): EGD (ESOPHAGOGASTRODUODENOSCOPY)   HPI: 62 y.o. female ...presents to the hospital now with melena of several days duration.  She takes an 81 mg aspirin as a routine for cardiovascular concerns.  She does have a history of hypertension and congestive heart failure and sees Dr. Brooks Hannon.       She has had pyrosis of late, and has tried baking soda.  She does not take nonsteroidals for the most part, and she is not routinely on acid suppression.     She is had a bit of nausea without vomiting.  She denies significant weight loss.  She denies dysphagia.       She does have underlying COPD and continues to smoke.  She is not on home oxygen.       Her past medical history is remarkable for a hysterectomy.  She has never had an upper endoscopy.       She does drink a 6 pack at a time, but only once or twice a week.      Review of patient's allergies indicates:  No Known Allergies    Current Outpatient Medications   Medication Instructions    albuterol-ipratropium (DUO-NEB) 2.5 mg-0.5 mg/3 mL nebulizer solution Inhalation    aspirin (ECOTRIN) 81 mg, Oral    digoxin (LANOXIN) 125 mcg tablet TAKE 1 TABLET BY MOUTH EVERY DAY    enalapril (VASOTEC) 5 MG tablet TAKE 1/2 TABLET BY MOUTH EVERY DAY    hydroCHLOROthiazide (HYDRODIURIL) 25 mg, Oral    KLOR-CON 10 10 mEq TbSR TAKE 1 TABLET BY MOUTH EVERY DAY    simvastatin (ZOCOR) 20 MG tablet TAKE 1 TABLET BY MOUTH EVERYDAY AT BEDTIME    traZODone (DESYREL) 50 MG tablet TAKIE 1/2 TABLET BY MOUTH AT BEDTIME       Past Medical History:   Diagnosis Date    COPD (chronic obstructive pulmonary disease)     GERD (gastroesophageal reflux disease)     Hyperlipidemia     Hypertension     Hypokalemia     Insomnia     Nicotine dependence     Osteoporosis        Past  Surgical History:   Procedure Laterality Date    ADENOIDECTOMY      HYSTERECTOMY      TONSILLECTOMY       Recent Labs   Lab 07/14/23  1347 07/15/23  0006 07/15/23  0520   WBC 7.23  --  7.43   RBC 2.92*  --  2.92*   HGB 9.0* 9.7* 8.9*   HCT 26.5* 28.6* 26.3*   MCV 90.8  --  90.1   MCH 30.8  --  30.5   MCHC 34.0  --  33.8   RDW 12.6  --  12.7     --  236   MPV 9.3  --  9.3       Recent Labs   Lab 07/14/23  1347 07/15/23  0520    137   K 4.1 3.9   CO2 27 27   BUN 9.2* 6.5*   CREATININE 0.70 0.55   CALCIUM 9.2 9.1   MG 1.90  --    ALBUMIN 4.2  --    ALKPHOS 51  --    ALT 14  --    AST 21  --    BILITOT 0.3  --        Recent Labs   Lab 07/14/23  1347   PTT 28.8   INR 0.93   ECG  Vent. Rate : 078 BPM     Atrial Rate : 078 BPM      P-R Int : 186 ms          QRS Dur : 078 ms       QT Int : 368 ms       P-R-T Axes : 067 031 076 degrees      QTc Int : 419 ms     Normal sinus rhythm   Septal infarct ,age undetermined   Abnormal ECG   No previous ECGs available   Confirmed by Ashly GONZALEZ Jomar ([id number]) on 5/18/2023 9:13:49 AM     CT Abdomen Pelvis W Wo Contrast   Final Result       No evidence of active GI hemorrhage       Lung nodule in the right lower lobe which is stable since 2020       Multiple compression fractures in the lower thoracic and lumbar spine.  The chronicity of the L2 and T12 fracture is unknown.  The other fractures are old.           Electronically signed by:       Lily Whyte   Date:                                                07/14/2023   Time:                                                17:56             Pre-op Assessment    I have reviewed the Patient Summary Reports.    I have reviewed the NPO Status.   I have reviewed the Medications.     Review of Systems  Anesthesia Hx:  No problems with previous Anesthesia  Denies Family Hx of Anesthesia complications.   Denies Personal Hx of Anesthesia complications.   Social:  Non-Smoker    Cardiovascular:   Exercise tolerance: good  Hypertension  Denies Angina.  Denies Orthopnea.  Denies PND.  Denies PRESSLEY.  Functional Capacity good / => 4 METS    Pulmonary:   COPD    Hepatic/GI:   GERD    Musculoskeletal:  Musculoskeletal Normal    Neurological:   Denies TIA. Denies CVA.    Psych:  Psychiatric Normal           Physical Exam  General: Well nourished, Alert and Oriented    Airway:  Mallampati: III   Mouth Opening: Normal  TM Distance: Normal  Tongue: Normal  Neck ROM: Normal ROM    Dental:  Intact    Chest/Lungs:  Clear to auscultation    Heart:  Rate: Normal  Rhythm: Regular Rhythm  No pretibial edema  No carotid bruits      Anesthesia Plan  Type of Anesthesia, risks & benefits discussed:    Anesthesia Type: Gen Natural Airway  Intra-op Monitoring Plan: Standard ASA Monitors  Post Op Pain Control Plan: IV/PO Opioids PRN  Induction:  IV  Informed Consent: Informed consent signed with the Patient and all parties understand the risks and agree with anesthesia plan.  All questions answered. Patient consented to blood products? No  ASA Score: 3  Day of Surgery Review of History & Physical: H&P Update referred to the surgeon/provider.  Anesthesia Plan Notes: GA TIVA    Ready For Surgery From Anesthesia Perspective.     .

## 2023-07-15 NOTE — PLAN OF CARE
Problem: Adult Inpatient Plan of Care  Goal: Plan of Care Review  Outcome: Ongoing, Progressing  Goal: Patient-Specific Goal (Individualized)  Outcome: Ongoing, Progressing  Goal: Absence of Hospital-Acquired Illness or Injury  Outcome: Ongoing, Progressing  Goal: Optimal Comfort and Wellbeing  Outcome: Ongoing, Progressing  Goal: Readiness for Transition of Care  Outcome: Ongoing, Progressing     Problem: Adjustment to Illness (Gastrointestinal Bleeding)  Goal: Optimal Coping with Acute Illness  Outcome: Ongoing, Progressing     Problem: Bleeding (Gastrointestinal Bleeding)  Goal: Hemostasis  Outcome: Ongoing, Progressing     Problem: Adjustment to Illness COPD (Chronic Obstructive Pulmonary Disease)  Goal: Optimal Chronic Illness Coping  Outcome: Ongoing, Progressing     Problem: Functional Ability Impaired COPD (Chronic Obstructive Pulmonary Disease)  Goal: Optimal Level of Functional [place]  Outcome: Ongoing, Progressing     Problem: Infection COPD (Chronic Obstructive Pulmonary Disease)  Goal: Absence of Infection Signs and Symptoms  Outcome: Ongoing, Progressing     Problem: Oral Intake Inadequate COPD (Chronic Obstructive Pulmonary Disease)  Goal: Improved Nutrition Intake  Outcome: Ongoing, Progressing     Problem: Respiratory Compromise COPD (Chronic Obstructive Pulmonary Disease)  Goal: Effective Oxygenation and Ventilation  Outcome: Ongoing, Progressing     Problem: Anemia  Goal: Anemia Symptom Improvement  Outcome: Ongoing, Progressing

## 2023-07-16 VITALS
RESPIRATION RATE: 20 BRPM | BODY MASS INDEX: 20.2 KG/M2 | SYSTOLIC BLOOD PRESSURE: 122 MMHG | HEART RATE: 92 BPM | HEIGHT: 63 IN | OXYGEN SATURATION: 99 % | TEMPERATURE: 98 F | WEIGHT: 114 LBS | DIASTOLIC BLOOD PRESSURE: 73 MMHG

## 2023-07-16 LAB
BASOPHILS # BLD AUTO: 0.03 X10(3)/MCL
BASOPHILS NFR BLD AUTO: 0.6 %
EOSINOPHIL # BLD AUTO: 0.2 X10(3)/MCL (ref 0–0.9)
EOSINOPHIL NFR BLD AUTO: 3.7 %
ERYTHROCYTE [DISTWIDTH] IN BLOOD BY AUTOMATED COUNT: 13 % (ref 11.5–17)
H. PYLORI STOOL: NEGATIVE
HCT VFR BLD AUTO: 27.7 % (ref 37–47)
HGB BLD-MCNC: 9.2 G/DL (ref 12–16)
IMM GRANULOCYTES # BLD AUTO: 0.02 X10(3)/MCL (ref 0–0.04)
IMM GRANULOCYTES NFR BLD AUTO: 0.4 %
LYMPHOCYTES # BLD AUTO: 1.76 X10(3)/MCL (ref 0.6–4.6)
LYMPHOCYTES NFR BLD AUTO: 32.8 %
MCH RBC QN AUTO: 30.8 PG (ref 27–31)
MCHC RBC AUTO-ENTMCNC: 33.2 G/DL (ref 33–36)
MCV RBC AUTO: 92.6 FL (ref 80–94)
MONOCYTES # BLD AUTO: 0.59 X10(3)/MCL (ref 0.1–1.3)
MONOCYTES NFR BLD AUTO: 11 %
NEUTROPHILS # BLD AUTO: 2.76 X10(3)/MCL (ref 2.1–9.2)
NEUTROPHILS NFR BLD AUTO: 51.5 %
NRBC BLD AUTO-RTO: 0 %
PLATELET # BLD AUTO: 265 X10(3)/MCL (ref 130–400)
PMV BLD AUTO: 9.3 FL (ref 7.4–10.4)
RBC # BLD AUTO: 2.99 X10(6)/MCL (ref 4.2–5.4)
WBC # SPEC AUTO: 5.36 X10(3)/MCL (ref 4.5–11.5)

## 2023-07-16 PROCEDURE — 25000003 PHARM REV CODE 250: Performed by: INTERNAL MEDICINE

## 2023-07-16 PROCEDURE — 96376 TX/PRO/DX INJ SAME DRUG ADON: CPT

## 2023-07-16 PROCEDURE — S4991 NICOTINE PATCH NONLEGEND: HCPCS | Performed by: INTERNAL MEDICINE

## 2023-07-16 PROCEDURE — C9113 INJ PANTOPRAZOLE SODIUM, VIA: HCPCS | Performed by: INTERNAL MEDICINE

## 2023-07-16 PROCEDURE — G0378 HOSPITAL OBSERVATION PER HR: HCPCS

## 2023-07-16 PROCEDURE — 63600175 PHARM REV CODE 636 W HCPCS: Performed by: INTERNAL MEDICINE

## 2023-07-16 PROCEDURE — 85025 COMPLETE CBC W/AUTO DIFF WBC: CPT | Performed by: INTERNAL MEDICINE

## 2023-07-16 RX ORDER — FLUTICASONE FUROATE AND VILANTEROL 100; 25 UG/1; UG/1
1 POWDER RESPIRATORY (INHALATION) DAILY
Qty: 30 EACH | Refills: 0 | Status: SHIPPED | OUTPATIENT
Start: 2023-07-16 | End: 2023-08-31 | Stop reason: SDUPTHER

## 2023-07-16 RX ORDER — PANTOPRAZOLE SODIUM 40 MG/1
40 TABLET, DELAYED RELEASE ORAL 2 TIMES DAILY
Qty: 60 TABLET | Refills: 0 | Status: SHIPPED | OUTPATIENT
Start: 2023-07-16 | End: 2023-10-10

## 2023-07-16 RX ORDER — IBUPROFEN 200 MG
1 TABLET ORAL DAILY
Qty: 14 PATCH | Refills: 0 | Status: SHIPPED | OUTPATIENT
Start: 2023-07-16 | End: 2023-07-30

## 2023-07-16 RX ADMIN — PANTOPRAZOLE SODIUM 40 MG: 40 INJECTION, POWDER, FOR SOLUTION INTRAVENOUS at 09:07

## 2023-07-16 RX ADMIN — SENNOSIDES AND DOCUSATE SODIUM 2 TABLET: 50; 8.6 TABLET ORAL at 06:07

## 2023-07-16 RX ADMIN — LISINOPRIL 5 MG: 5 TABLET ORAL at 09:07

## 2023-07-16 RX ADMIN — NICOTINE 1 PATCH: 21 PATCH, EXTENDED RELEASE TRANSDERMAL at 09:07

## 2023-07-16 RX ADMIN — DIGOXIN 0.12 MG: 125 TABLET ORAL at 09:07

## 2023-07-16 NOTE — PLAN OF CARE
Problem: Adult Inpatient Plan of Care  Goal: Plan of Care Review  Outcome: Ongoing, Progressing  Goal: Patient-Specific Goal (Individualized)  Outcome: Ongoing, Progressing  Goal: Absence of Hospital-Acquired Illness or Injury  Outcome: Ongoing, Progressing  Goal: Optimal Comfort and Wellbeing  Outcome: Ongoing, Progressing     Problem: Adjustment to Illness (Gastrointestinal Bleeding)  Goal: Optimal Coping with Acute Illness  Outcome: Ongoing, Progressing     Problem: Adjustment to Illness COPD (Chronic Obstructive Pulmonary Disease)  Goal: Optimal Chronic Illness Coping  Outcome: Ongoing, Progressing

## 2023-07-16 NOTE — PROGRESS NOTES
Gastroenterology Progress Note    Subjective/Interval History:  This is a 62 y.o. female who typically sees Dr. Donis Mcneil in [place].  She has had colonoscopies in the past, some 10 years ago with Mikhail Hoffman in [place], and then in 2016 with Dr. Peter.  She believes she had polyps.  Despite that, it sounds as though she just recently had a Cologuard performed, positive.       She presents to the hospital now with melena of several days duration.  She takes an 81 mg aspirin as a routine for cardiovascular concerns.  She does have a history of hypertension and congestive heart failure and sees Dr. Brooks Hannon.       She has had pyrosis of late, and has tried baking soda.  She does not take nonsteroidals for the most part, and she is not routinely on acid suppression.     She had a bit of nausea without vomiting.  She denies significant weight loss.  She denies dysphagia.       She does have underlying COPD and continues to smoke.  She is not on home oxygen.       Her past medical history is remarkable for a hysterectomy.  She had never had an upper endoscopy.       She does drink a 6 pack at a time, but only once or twice a week...    She underwent upper endoscopy July 15, 2023 and had multiple antral ulcers evident including 1 with a red spot that prompted gold probe cautery.  We obtained adjacent biopsied to rule out Helicobacter.  She has done well in the interim on acid suppression and her hemoglobin is stable.  She seems up for discharge.  We discussed her need to quit smoking and limit alcohol and avoid nonsteroidals.  I think she can take a baby aspirin if her cardiovascular issues warrant.  Her positive Cologuard speaks for colonoscopy.  Of course, down the line she is not a Cologuard candidate if she truly had a history of polyps.  I would be okay with her discharge on a proton pump inhibitor      ROS:  ROS    Vital Signs:  /73   Pulse 92   Temp 97.9 °F (36.6 °C) (Oral)   Resp 20   Ht 5'  "3" (1.6 m)   Wt 51.7 kg (114 lb)   SpO2 99%   Breastfeeding No   BMI 20.19 kg/m²   Body mass index is 20.19 kg/m².    Physical Exam:  Physical Exam  Constitutional:       Appearance: Normal appearance.   HENT:      Mouth/Throat:      Mouth: Mucous membranes are moist.   Eyes:      Extraocular Movements: Extraocular movements intact.      Pupils: Pupils are equal, round, and reactive to light.   Cardiovascular:      Rate and Rhythm: Normal rate and regular rhythm.      Heart sounds: Normal heart sounds.   Pulmonary:      Breath sounds: Normal breath sounds.   Abdominal:      General: Abdomen is flat.      Palpations: Abdomen is soft.      Tenderness: There is no abdominal tenderness.   Skin:     General: Skin is warm and dry.   Neurological:      General: No focal deficit present.      Mental Status: She is alert and oriented to person, place, and time.   Psychiatric:         Mood and Affect: Mood normal.         Behavior: Behavior normal.       Labs:  Recent Results (from the past 48 hour(s))   CBC with Differential    Collection Time: 07/14/23  1:47 PM   Result Value Ref Range    WBC 7.23 4.50 - 11.50 x10(3)/mcL    RBC 2.92 (L) 4.20 - 5.40 x10(6)/mcL    Hgb 9.0 (L) 12.0 - 16.0 g/dL    Hct 26.5 (L) 37.0 - 47.0 %    MCV 90.8 80.0 - 94.0 fL    MCH 30.8 27.0 - 31.0 pg    MCHC 34.0 33.0 - 36.0 g/dL    RDW 12.6 11.5 - 17.0 %    Platelet 248 130 - 400 x10(3)/mcL    MPV 9.3 7.4 - 10.4 fL    Neut % 64.7 %    Lymph % 25.6 %    Mono % 6.9 %    Eos % 1.9 %    Basophil % 0.6 %    Lymph # 1.85 0.6 - 4.6 x10(3)/mcL    Neut # 4.68 2.1 - 9.2 x10(3)/mcL    Mono # 0.50 0.1 - 1.3 x10(3)/mcL    Eos # 0.14 0 - 0.9 x10(3)/mcL    Baso # 0.04 <=0.2 x10(3)/mcL    IG# 0.02 0 - 0.04 x10(3)/mcL    IG% 0.3 %    NRBC% 0.0 %   Comprehensive Metabolic Panel    Collection Time: 07/14/23  1:47 PM   Result Value Ref Range    Sodium Level 136 136 - 145 mmol/L    Potassium Level 4.1 3.5 - 5.1 mmol/L    Chloride 103 98 - 107 mmol/L    Carbon Dioxide " 27 23 - 31 mmol/L    Glucose Level 86 82 - 115 mg/dL    Blood Urea Nitrogen 9.2 (L) 9.8 - 20.1 mg/dL    Creatinine 0.70 0.55 - 1.02 mg/dL    Calcium Level Total 9.2 8.4 - 10.2 mg/dL    Protein Total 6.7 5.8 - 7.6 gm/dL    Albumin Level 4.2 3.4 - 4.8 g/dL    Globulin 2.5 2.4 - 3.5 gm/dL    Albumin/Globulin Ratio 1.7 1.1 - 2.0 ratio    Bilirubin Total 0.3 <=1.5 mg/dL    Alkaline Phosphatase 51 40 - 150 unit/L    Alanine Aminotransferase 14 0 - 55 unit/L    Aspartate Aminotransferase 21 5 - 34 unit/L    eGFR >60 mls/min/1.73/m2   Protime-INR    Collection Time: 07/14/23  1:47 PM   Result Value Ref Range    PT 12.4 (L) 12.5 - 14.5 seconds    INR 0.93 0.00 - 1.30   APTT    Collection Time: 07/14/23  1:47 PM   Result Value Ref Range    PTT 28.8 23.2 - 33.7 seconds   Lipase    Collection Time: 07/14/23  1:47 PM   Result Value Ref Range    Lipase Level 34 <=60 U/L   Magnesium    Collection Time: 07/14/23  1:47 PM   Result Value Ref Range    Magnesium Level 1.90 1.60 - 2.60 mg/dL   Iron and TIBC    Collection Time: 07/14/23  1:47 PM   Result Value Ref Range    Iron Binding Capacity Unsaturated 224 70 - 310 ug/dL    Iron Level 59 50 - 170 ug/dL    Transferrin 258 173 - 360 mg/dL    Iron Binding Capacity Total 283 250 - 450 ug/dL    Iron Saturation 21 20 - 50 %   Ferritin    Collection Time: 07/14/23  1:47 PM   Result Value Ref Range    Ferritin Level 46.35 4.63 - 204.00 ng/mL   Vitamin B12    Collection Time: 07/14/23  1:47 PM   Result Value Ref Range    Vitamin B12 Level 456 213 - 816 pg/mL   Type & Screen    Collection Time: 07/14/23  2:15 PM   Result Value Ref Range    Group & Rh O POS     Indirect Shannon GEL NEG     Specimen Outdate 07/17/2023 23:59    Urinalysis, Reflex to Urine Culture    Collection Time: 07/14/23  2:15 PM    Specimen: Urine   Result Value Ref Range    Color, UA Yellow Yellow, Light-Yellow, Dark Yellow, Faustina, Straw    Appearance, UA Clear Clear    Specific Gravity, UA 1.016 1.005 - 1.030    pH, UA 6.0  5.0 - 8.5    Protein, UA Negative Negative    Glucose, UA Negative Negative, Normal    Ketones, UA Negative Negative    Blood, UA Negative Negative    Bilirubin, UA Negative Negative    Urobilinogen, UA 1.0 0.2, 1.0, Normal    Nitrites, UA Negative Negative    Leukocyte Esterase, UA Negative Negative   Urinalysis, Microscopic    Collection Time: 07/14/23  2:15 PM   Result Value Ref Range    RBC, UA <5 <=5 /HPF    WBC, UA <5 <=5 /HPF    Squamous Epithelial Cells, UA <5 <=5 /HPF    Bacteria, UA None Seen None Seen, Rare, Occasional /HPF   ABORH RETYPE    Collection Time: 07/14/23  3:24 PM   Result Value Ref Range    ABORH Retype O POS    Hemoglobin and Hematocrit    Collection Time: 07/15/23 12:06 AM   Result Value Ref Range    Hgb 9.7 (L) 12.0 - 16.0 g/dL    Hct 28.6 (L) 37.0 - 47.0 %   Folate    Collection Time: 07/15/23  5:20 AM   Result Value Ref Range    Folate Level 51.6 (H) 7.0 - 31.4 ng/mL   CBC Without Differential    Collection Time: 07/15/23  5:20 AM   Result Value Ref Range    WBC 7.43 4.50 - 11.50 x10(3)/mcL    RBC 2.92 (L) 4.20 - 5.40 x10(6)/mcL    Hgb 8.9 (L) 12.0 - 16.0 g/dL    Hct 26.3 (L) 37.0 - 47.0 %    MCV 90.1 80.0 - 94.0 fL    MCH 30.5 27.0 - 31.0 pg    MCHC 33.8 33.0 - 36.0 g/dL    RDW 12.7 11.5 - 17.0 %    Platelet 236 130 - 400 x10(3)/mcL    MPV 9.3 7.4 - 10.4 fL    NRBC% 0.0 %   Basic Metabolic Panel    Collection Time: 07/15/23  5:20 AM   Result Value Ref Range    Sodium Level 137 136 - 145 mmol/L    Potassium Level 3.9 3.5 - 5.1 mmol/L    Chloride 104 98 - 107 mmol/L    Carbon Dioxide 27 23 - 31 mmol/L    Glucose Level 90 82 - 115 mg/dL    Blood Urea Nitrogen 6.5 (L) 9.8 - 20.1 mg/dL    Creatinine 0.55 0.55 - 1.02 mg/dL    BUN/Creatinine Ratio 12     Calcium Level Total 9.1 8.4 - 10.2 mg/dL    Anion Gap 6.0 mEq/L    eGFR >60 mls/min/1.73/m2   Helicobacter Pylori Antigen Fecal EIA    Collection Time: 07/15/23  3:26 PM   Result Value Ref Range    H. pylori Stool Negative    CBC with  Differential    Collection Time: 07/16/23  6:58 AM   Result Value Ref Range    WBC 5.36 4.50 - 11.50 x10(3)/mcL    RBC 2.99 (L) 4.20 - 5.40 x10(6)/mcL    Hgb 9.2 (L) 12.0 - 16.0 g/dL    Hct 27.7 (L) 37.0 - 47.0 %    MCV 92.6 80.0 - 94.0 fL    MCH 30.8 27.0 - 31.0 pg    MCHC 33.2 33.0 - 36.0 g/dL    RDW 13.0 11.5 - 17.0 %    Platelet 265 130 - 400 x10(3)/mcL    MPV 9.3 7.4 - 10.4 fL    Neut % 51.5 %    Lymph % 32.8 %    Mono % 11.0 %    Eos % 3.7 %    Basophil % 0.6 %    Lymph # 1.76 0.6 - 4.6 x10(3)/mcL    Neut # 2.76 2.1 - 9.2 x10(3)/mcL    Mono # 0.59 0.1 - 1.3 x10(3)/mcL    Eos # 0.20 0 - 0.9 x10(3)/mcL    Baso # 0.03 <=0.2 x10(3)/mcL    IG# 0.02 0 - 0.04 x10(3)/mcL    IG% 0.4 %    NRBC% 0.0 %   Hemoglobin 9.2.  Helicobacter antigen on stool negative      Assessment/Plan:   We discussed her need to quit smoking and limit alcohol and avoid nonsteroidals.  I think she can take a baby aspirin if her cardiovascular issues warrant.  Her positive Cologuard speaks for colonoscopy.  Of course, down the line she is not a Cologuard candidate if she truly had a history of polyps.  I would be okay with her discharge on a proton pump inhibitor         Tyree Hodges PA-C

## 2023-07-16 NOTE — DISCHARGE SUMMARY
[facility] Hospital Medicine Discharge Summary    Admit Date: 7/14/2023  Discharge Date and Time: 7/16/202312:02 PM  Admitting Physician: MOOKIE Team  Discharging Physician: Soco Bhat MD.  Primary Care Physician: Donis Mcneil MD  Consults: Gastroenterology    Discharge Diagnoses:  Melena   GI bleed  Acute blood loss anemia  History of COPD not on oxygen  Chronic tobacco use   Essential hypertension  Hyperlipidemia  Insomnia    Hospital Course:    62-year-old female medical history of chronic active tobacco smoker, COPD not on home oxygen, HTN, HLD, persistent sinus tachycardia, present to the ED on 07/14/2023 with chief complaint of black stool melena for about 10 days.     Report initially she noted black stool 2 weeks ago, was formed stool about 1-2 bowel movements per day until earlier this week where she started having watery black stool more than 5-6 bowel movements per day for straight 3 days until today which slowed down and only had 1 bowel movement.  Report upper abdominal pain and nausea and 1 episode of nonbloody vomiting.  Denies fever or chills.  Recently followed up with her PCP and had a routine screening with positive Cologuard on 06/20/2023.  Report intermittent episode of black stool over the years.  She is not currently taking NSAID though she has used it earlier this year in January for compression vertebral fractures.  She takes aspirin 81 mg daily, no anticoagulants.  Does not recall having prior EGD.  Last colonoscopy in 2016 with Dr. Peter and report polyps removed.     On arrival to ED she was afebrile and hemodynamically stable.  Labs notable for hemoglobin 9.0 (was 14.6 on 07/06/2023), BUN 9.2.  She was started on IV Protonix, GI consulted and referred to hospital medicine service for further evaluation and management.  GI was consulted patient underwent endoscopy on July 15 had multiple antral ulcers including 1 with red spot status post  cauterization.  GI recommended PPI.  Patient's H&H stayed stable so was discharged home in stable condition with a follow-up with GI physician and patient's primary care physician and patient was instructed to quit smoking and limit alcohol and avoid NSAIDs  Pt was seen and examined on the day of discharge  Vitals:  VITAL SIGNS: 24 HRS MIN & MAX LAST   Temp  Min: 97.2 °F (36.2 °C)  Max: 98.2 °F (36.8 °C) 97.9 °F (36.6 °C)   BP  Min: 90/54  Max: 155/71 122/73   Pulse  Min: 72  Max: 98  92   Resp  Min: 12  Max: 22 20   SpO2  Min: 95 %  Max: 100 % 99 %       Physical Exam:  General: In no acute distress, afebrile  Chest: Clear to auscultation bilaterally  Heart: RRR, +S1, S2, no appreciable murmur  Abdomen: Soft, nontender, BS +  MSK: Warm, no lower extremity edema, no clubbing or cyanosis  Neurologic: Alert and oriented x4,     Procedures Performed: No admission procedures for hospital encounter.     Significant Diagnostic Studies: See Full reports for all details    Recent Labs   Lab 07/14/23  1347 07/15/23  0006 07/15/23  0520 07/16/23  0658   WBC 7.23  --  7.43 5.36   RBC 2.92*  --  2.92* 2.99*   HGB 9.0* 9.7* 8.9* 9.2*   HCT 26.5* 28.6* 26.3* 27.7*   MCV 90.8  --  90.1 92.6   MCH 30.8  --  30.5 30.8   MCHC 34.0  --  33.8 33.2   RDW 12.6  --  12.7 13.0     --  236 265   MPV 9.3  --  9.3 9.3       Recent Labs   Lab 07/14/23  1347 07/15/23  0520    137   K 4.1 3.9   CO2 27 27   BUN 9.2* 6.5*   CREATININE 0.70 0.55   CALCIUM 9.2 9.1   MG 1.90  --    ALBUMIN 4.2  --    ALKPHOS 51  --    ALT 14  --    AST 21  --    BILITOT 0.3  --         Microbiology Results (last 7 days)       ** No results found for the last 168 hours. **             CT Abdomen Pelvis W Wo Contrast  Narrative: EXAMINATION:  CT ABDOMEN PELVIS W WO CONTRAST    CLINICAL HISTORY:  GI bleed;    TECHNIQUE:  Low dose axial images, sagittal and coronal reformations were obtained from the lung bases to the pubic symphysis following the IV  administration of  contrast.  Delayed imaging and pre contrasted imaging was performed as well. Automatic exposure control is utilized to reduce patient radiation exposure.    COMPARISON:  09/28/2018 and CT scan the chest from 02/12/2020    FINDINGS:  The lung bases are clear.  There is a lung nodule in the right lower lobe that measures 1 cm x 8 mm.  This was seen on the 2020 examination as well and appears unchanged.  The    The liver appears normal.  No liver mass or lesion is seen.  Portal and hepatic veins appear normal.    The gallbladder appears grossly unremarkable.    The pancreas appears normal.  No pancreatic mass or lesion is seen.    The spleen appears normal.  No splenic mass or lesion is seen.    The adrenal glands appear normal.  No adrenal nodule is seen.    The kidneys are normal in size.  No hydronephrosis is seen.  No hydroureter is seen.  No nephrolithiasis or ureteral stone is seen.  Cortical nephrogram phase of the examination shows no cortical renal abnormality and delayed pyelogram phase of the examination shows no filling defects within the renal pelvis or calices or the visualized portions of the ureters.    Urinary bladder appears normal.    No colitis is seen.  No diverticulitis is seen.  No colonic mass or lesion or inflammatory process is seen.  No pooling of contrast is seen to suggest active GI hemorrhage.    No free air is seen.  No free fluid is seen.    Osteoporotic and degenerative changes seen in the bones.  There are compression fracture seen at the level of T12-L1 L2-L3 and L4.  The L1-L3 and L4 fractures were seen in 2008.  The chronicity of the L2 fracture and the T12 compression fracture is unknown.  Impression: No evidence of active GI hemorrhage    Lung nodule in the right lower lobe which is stable since 2020    Multiple compression fractures in the lower thoracic and lumbar spine.  The chronicity of the L2 and T12 fracture is unknown.  The other fractures are  old.    Electronically signed by: Lily Whyte  Date:    07/14/2023  Time:    17:56         Medication List        START taking these medications      fluticasone furoate-vilanteroL 100-25 mcg/dose diskus inhaler  Commonly known as: BREO  Inhale 1 puff into the lungs once daily. Controller  Replaces: fluticasone-salmeterol 250-50 mcg/dose 250-50 mcg/dose diskus inhaler     nicotine 14 mg/24 hr  Commonly known as: NICODERM CQ  Place 1 patch onto the skin once daily. for 14 days     pantoprazole 40 MG tablet  Commonly known as: PROTONIX  Take 1 tablet (40 mg total) by mouth 2 (two) times daily.            CONTINUE taking these medications      albuterol-ipratropium 2.5 mg-0.5 mg/3 mL nebulizer solution  Commonly known as: DUO-NEB     aspirin 81 MG EC tablet  Commonly known as: ECOTRIN     digoxin 125 mcg tablet  Commonly known as: LANOXIN  TAKE 1 TABLET BY MOUTH EVERY DAY     enalapril 5 MG tablet  Commonly known as: VASOTEC  TAKE 1/2 TABLET BY MOUTH EVERY DAY     simvastatin 20 MG tablet  Commonly known as: ZOCOR  TAKE 1 TABLET BY MOUTH EVERYDAY AT BEDTIME     traZODone 50 MG tablet  Commonly known as: DESYREL  TAKIE 1/2 TABLET BY MOUTH AT BEDTIME            STOP taking these medications      fluticasone-salmeterol 250-50 mcg/dose 250-50 mcg/dose diskus inhaler  Commonly known as: ADVAIR  Replaced by: fluticasone furoate-vilanteroL 100-25 mcg/dose diskus inhaler     hydroCHLOROthiazide 25 MG tablet  Commonly known as: HYDRODIURIL     KLOR-CON 10 10 MEQ Tbsr  Generic drug: potassium chloride               Where to Get Your Medications        These medications were sent to [facility]/pharmacy #[id number] - [place] - [address] [address]      Phone: [phone]   fluticasone furoate-vilanteroL 100-25 mcg/dose diskus inhaler  nicotine 14 mg/24 hr  pantoprazole 40 MG tablet          Explained in detail to the patient about the discharge plan, medications, and follow-up visits.  Pt understands and agrees with the treatment plan  Discharge Disposition: Home or Self Care   Discharged Condition: stable  Diet-   Dietary Orders (From admission, onward)       Start     Ordered    07/15/23 1414  Diet Adult Regular  Diet effective now        Comments: bland    07/15/23 1413                   Medications Per DC med rec  Activities as tolerated   Follow-up Information       Donis Mcneil MD Follow up in 1 week(s).    Specialty: Family Medicine  Contact information:  [address]  [place] [address]  [phone]               Tyree Hodges MD Follow up in 2 week(s).    Specialty: Gastroenterology  Why: for colonoscopy  Contact information:  [place] [id number] [phone]                           For further questions contact hospitalist office    Discharge time 33 minutes    For worsening symptoms, chest pain, shortness of breath, increased abdominal pain, high grade fever, stroke or stroke like symptoms, immediately go to the nearest Emergency Room or call 911 as soon as possible.      Soco Baptiste M.D, on 7/16/2023. at 12:02 PM.

## 2023-07-16 NOTE — PLAN OF CARE
Problem: Adult Inpatient Plan of Care  Goal: Plan of Care Review  Outcome: Ongoing, Progressing  Goal: Patient-Specific Goal (Individualized)  Outcome: Ongoing, Progressing  Goal: Absence of Hospital-Acquired Illness or Injury  Outcome: Ongoing, Progressing  Goal: Optimal Comfort and Wellbeing  Outcome: Ongoing, Progressing  Goal: Readiness for Transition of Care  Outcome: Ongoing, Progressing     Problem: Adjustment to Illness (Gastrointestinal Bleeding)  Goal: Optimal Coping with Acute Illness  Outcome: Ongoing, Progressing     Problem: Bleeding (Gastrointestinal Bleeding)  Goal: Hemostasis  Outcome: Ongoing, Progressing     Problem: Adjustment to Illness COPD (Chronic Obstructive Pulmonary Disease)  Goal: Optimal Chronic Illness Coping  Outcome: Ongoing, Progressing     Problem: Functional Ability Impaired COPD (Chronic Obstructive Pulmonary Disease)  Goal: Optimal Level of Functional Glenn  Outcome: Ongoing, Progressing     Problem: Infection COPD (Chronic Obstructive Pulmonary Disease)  Goal: Absence of Infection Signs and Symptoms  Outcome: Ongoing, Progressing     Problem: Oral Intake Inadequate COPD (Chronic Obstructive Pulmonary Disease)  Goal: Improved Nutrition Intake  Outcome: Ongoing, Progressing     Problem: Respiratory Compromise COPD (Chronic Obstructive Pulmonary Disease)  Goal: Effective Oxygenation and Ventilation  Outcome: Ongoing, Progressing     Problem: Anemia  Goal: Anemia Symptom Improvement  Outcome: Ongoing, Progressing

## 2023-07-18 LAB — PSYCHE PATHOLOGY RESULT: NORMAL

## 2023-07-20 ENCOUNTER — PATIENT OUTREACH (OUTPATIENT)
Dept: ADMINISTRATIVE | Facility: CLINIC | Age: 63
End: 2023-07-20
Payer: MEDICAID

## 2023-07-20 NOTE — PROGRESS NOTES
C3 nurse spoke with Antonina Jaimes for a TCC post hospital discharge follow up call. The patient has a scheduled [facility] appointment with Donis Mcneil MD (Family Medicine) 7/25/23 @ 10:30 and Tyree Hodges MD (Gastroenterology) on  7/31/23.

## 2023-07-20 NOTE — PROGRESS NOTES
C3 nurse attempted to contact Antonina Jaimes for a TCC post hospital discharge follow up call. Patient requested a call back later today.  The patient has a scheduled HOSFU appointment with Donis Mcneil MD (Family Medicine) 7/25/23 @ 10:30.

## 2023-07-25 ENCOUNTER — OFFICE VISIT (OUTPATIENT)
Dept: FAMILY MEDICINE | Facility: CLINIC | Age: 63
End: 2023-07-25
Payer: MEDICAID

## 2023-07-25 VITALS
OXYGEN SATURATION: 98 % | HEIGHT: 63 IN | HEART RATE: 84 BPM | SYSTOLIC BLOOD PRESSURE: 130 MMHG | RESPIRATION RATE: 18 BRPM | BODY MASS INDEX: 20.52 KG/M2 | DIASTOLIC BLOOD PRESSURE: 74 MMHG | TEMPERATURE: 98 F | WEIGHT: 115.81 LBS

## 2023-07-25 DIAGNOSIS — R19.5 POSITIVE COLORECTAL CANCER SCREENING USING COLOGUARD TEST: ICD-10-CM

## 2023-07-25 DIAGNOSIS — K25.9 GASTRIC ULCER, UNSPECIFIED CHRONICITY, UNSPECIFIED WHETHER GASTRIC ULCER HEMORRHAGE OR PERFORATION PRESENT: Primary | ICD-10-CM

## 2023-07-25 DIAGNOSIS — Z12.31 SCREENING MAMMOGRAM, ENCOUNTER FOR: ICD-10-CM

## 2023-07-25 PROCEDURE — 3078F PR MOST RECENT DIASTOLIC BLOOD PRESSURE < 80 MM HG: ICD-10-PCS | Mod: CPTII,,, | Performed by: FAMILY MEDICINE

## 2023-07-25 PROCEDURE — 3008F BODY MASS INDEX DOCD: CPT | Mod: CPTII,,, | Performed by: FAMILY MEDICINE

## 2023-07-25 PROCEDURE — 1160F RVW MEDS BY RX/DR IN RCRD: CPT | Mod: CPTII,,, | Performed by: FAMILY MEDICINE

## 2023-07-25 PROCEDURE — 4010F PR ACE/ARB THEARPY RXD/TAKEN: ICD-10-PCS | Mod: CPTII,,, | Performed by: FAMILY MEDICINE

## 2023-07-25 PROCEDURE — 1160F PR REVIEW ALL MEDS BY PRESCRIBER/CLIN PHARMACIST DOCUMENTED: ICD-10-PCS | Mod: CPTII,,, | Performed by: FAMILY MEDICINE

## 2023-07-25 PROCEDURE — 3078F DIAST BP <80 MM HG: CPT | Mod: CPTII,,, | Performed by: FAMILY MEDICINE

## 2023-07-25 PROCEDURE — 3008F PR BODY MASS INDEX (BMI) DOCUMENTED: ICD-10-PCS | Mod: CPTII,,, | Performed by: FAMILY MEDICINE

## 2023-07-25 PROCEDURE — 1159F PR MEDICATION LIST DOCUMENTED IN MEDICAL RECORD: ICD-10-PCS | Mod: CPTII,,, | Performed by: FAMILY MEDICINE

## 2023-07-25 PROCEDURE — 99214 PR OFFICE/OUTPT VISIT, EST, LEVL IV, 30-39 MIN: ICD-10-PCS | Mod: ,,, | Performed by: FAMILY MEDICINE

## 2023-07-25 PROCEDURE — 1159F MED LIST DOCD IN RCRD: CPT | Mod: CPTII,,, | Performed by: FAMILY MEDICINE

## 2023-07-25 PROCEDURE — 99214 OFFICE O/P EST MOD 30 MIN: CPT | Mod: ,,, | Performed by: FAMILY MEDICINE

## 2023-07-25 PROCEDURE — 3075F SYST BP GE 130 - 139MM HG: CPT | Mod: CPTII,,, | Performed by: FAMILY MEDICINE

## 2023-07-25 PROCEDURE — 3075F PR MOST RECENT SYSTOLIC BLOOD PRESS GE 130-139MM HG: ICD-10-PCS | Mod: CPTII,,, | Performed by: FAMILY MEDICINE

## 2023-07-25 PROCEDURE — 4010F ACE/ARB THERAPY RXD/TAKEN: CPT | Mod: CPTII,,, | Performed by: FAMILY MEDICINE

## 2023-07-25 NOTE — PROGRESS NOTES
Subjective:       Patient ID: Antonina Jaimes is a 62 y.o. female.    Chief Complaint: TCM Discharge [facility] 7/17/23      TCM    Chief Complaint   Melena     History of Present Illness   This is a 62-year-old female medical history of chronic active tobacco smoker, COPD not on home oxygen, HTN, HLD, persistent sinus tachycardia, present to the ED on 07/14/2023 with chief complaint of black stool melena for about 10 days.     Report initially she noted black stool 2 weeks ago, was formed stool about 1-2 bowel movements per day until earlier this week where she started having watery black stool more than 5-6 bowel movements per day for straight 3 days until today which slowed down and only had 1 bowel movement.  Report upper abdominal pain and nausea and 1 episode of nonbloody vomiting.  Denies fever or chills.  Recently followed up with her PCP and had a routine screening with positive Cologuard on 06/20/2023.  Report intermittent episode of black stool over the years.  She is not currently taking NSAID though she has used it earlier this year in January for compression vertebral fractures.  She takes aspirin 81 mg daily, no anticoagulants.  Does not recall having prior EGD.  Last colonoscopy in 2016 with Dr. Peter and report polyps removed.     On arrival to ED she was afebrile and hemodynamically stable.  Labs notable for hemoglobin 9.0 (was 14.6 on 07/06/2023), BUN 9.2.  She was started on IV Protonix, GI consulted and referred to hospital medicine service for further evaluation and management.     Upon my evaluation she is resting comfortably, denies any further bowel movements with last bowel movement being this morning.       Repeat hemoglobin 9.7.    Patient discharge 07/16/2023  Review of Systems   Constitutional: Negative.    Respiratory: Negative.     Cardiovascular: Negative.    Gastrointestinal: Negative.         Patient reports melena and went to ER for follow work-up; EGD with Dr Hodges during admit  "revealed ulcers  Patient reports normal BM since discharge   Psychiatric/Behavioral: Negative.           Objective:      /74 (BP Location: Right arm, Patient Position: Sitting, BP Method: Large (Manual))   Pulse 84   Temp 97.6 °F (36.4 °C)   Resp 18   Ht 5' 3" (1.6 m)   Wt 52.5 kg (115 lb 12.8 oz)   SpO2 98%   BMI 20.51 kg/m²    Physical Exam  Constitutional:       Appearance: Normal appearance.   Cardiovascular:      Rate and Rhythm: Normal rate and regular rhythm.      Heart sounds: Normal heart sounds.   Pulmonary:      Effort: Pulmonary effort is normal.      Breath sounds: Normal breath sounds.   Neurological:      Mental Status: She is alert.   Psychiatric:         Mood and Affect: Mood normal.         Behavior: Behavior normal.         Thought Content: Thought content normal.         Judgment: Judgment normal.             Assessment:       Problem List Items Addressed This Visit    None  Visit Diagnoses       Gastric ulcer, unspecified chronicity, unspecified whether gastric ulcer hemorrhage or perforation present    -  Primary    Positive colorectal cancer screening using Cologuard test        Screening mammogram, encounter for        Relevant Orders    Mammo Digital Screening Bilat w/ Florentino               Plan:   1. Gastric ulcer, unspecified chronicity, unspecified whether gastric ulcer hemorrhage or perforation present  Medications reviewed  Continue Protonix 40 mg BID  Diet modification  Monitor  Return to clinic with any concerns    2. Positive colorectal cancer screening using Cologuard test  Refer patient to Dr Marcano per patient request    3. Screening mammogram, encounter for  -     Mammo Digital Screening Bilat w/ Florentino; Future; Expected date: 07/25/2023  Patient to schedule mammogram             "

## 2023-07-25 NOTE — H&P (VIEW-ONLY)
Subjective:       Patient ID: Antonina Jaimes is a 62 y.o. female.    Chief Complaint: TCM Discharge [facility] 7/17/23      TCM    Chief Complaint   Melena     History of Present Illness   This is a 62-year-old female medical history of chronic active tobacco smoker, COPD not on home oxygen, HTN, HLD, persistent sinus tachycardia, present to the ED on 07/14/2023 with chief complaint of black stool melena for about 10 days.     Report initially she noted black stool 2 weeks ago, was formed stool about 1-2 bowel movements per day until earlier this week where she started having watery black stool more than 5-6 bowel movements per day for straight 3 days until today which slowed down and only had 1 bowel movement.  Report upper abdominal pain and nausea and 1 episode of nonbloody vomiting.  Denies fever or chills.  Recently followed up with her PCP and had a routine screening with positive Cologuard on 06/20/2023.  Report intermittent episode of black stool over the years.  She is not currently taking NSAID though she has used it earlier this year in January for compression vertebral fractures.  She takes aspirin 81 mg daily, no anticoagulants.  Does not recall having prior EGD.  Last colonoscopy in 2016 with Dr. Peter and report polyps removed.     On arrival to ED she was afebrile and hemodynamically stable.  Labs notable for hemoglobin 9.0 (was 14.6 on 07/06/2023), BUN 9.2.  She was started on IV Protonix, GI consulted and referred to hospital medicine service for further evaluation and management.     Upon my evaluation she is resting comfortably, denies any further bowel movements with last bowel movement being this morning.       Repeat hemoglobin 9.7.    Patient discharge 07/16/2023  Review of Systems   Constitutional: Negative.    Respiratory: Negative.     Cardiovascular: Negative.    Gastrointestinal: Negative.         Patient reports melena and went to ER for follow work-up; EGD with Dr Hodges during admit  "revealed ulcers  Patient reports normal BM since discharge   Psychiatric/Behavioral: Negative.           Objective:      /74 (BP Location: Right arm, Patient Position: Sitting, BP Method: Large (Manual))   Pulse 84   Temp 97.6 °F (36.4 °C)   Resp 18   Ht 5' 3" (1.6 m)   Wt 52.5 kg (115 lb 12.8 oz)   SpO2 98%   BMI 20.51 kg/m²    Physical Exam  Constitutional:       Appearance: Normal appearance.   Cardiovascular:      Rate and Rhythm: Normal rate and regular rhythm.      Heart sounds: Normal heart sounds.   Pulmonary:      Effort: Pulmonary effort is normal.      Breath sounds: Normal breath sounds.   Neurological:      Mental Status: She is alert.   Psychiatric:         Mood and Affect: Mood normal.         Behavior: Behavior normal.         Thought Content: Thought content normal.         Judgment: Judgment normal.             Assessment:       Problem List Items Addressed This Visit    None  Visit Diagnoses       Gastric ulcer, unspecified chronicity, unspecified whether gastric ulcer hemorrhage or perforation present    -  Primary    Positive colorectal cancer screening using Cologuard test        Screening mammogram, encounter for        Relevant Orders    Mammo Digital Screening Bilat w/ Florentino               Plan:   1. Gastric ulcer, unspecified chronicity, unspecified whether gastric ulcer hemorrhage or perforation present  Medications reviewed  Continue Protonix 40 mg BID  Diet modification  Monitor  Return to clinic with any concerns    2. Positive colorectal cancer screening using Cologuard test  Refer patient to Dr Marcano per patient request    3. Screening mammogram, encounter for  -     Mammo Digital Screening Bilat w/ Florentino; Future; Expected date: 07/25/2023  Patient to schedule mammogram             "

## 2023-07-26 DIAGNOSIS — E78.5 HYPERLIPIDEMIA, UNSPECIFIED HYPERLIPIDEMIA TYPE: ICD-10-CM

## 2023-07-26 DIAGNOSIS — E87.6 POTASSIUM (K) DEFICIENCY: Primary | ICD-10-CM

## 2023-07-26 DIAGNOSIS — I10 HYPERTENSION, UNSPECIFIED TYPE: ICD-10-CM

## 2023-07-26 RX ORDER — ENALAPRIL MALEATE 5 MG/1
TABLET ORAL
Qty: 15 TABLET | Refills: 3 | Status: SHIPPED | OUTPATIENT
Start: 2023-07-26 | End: 2023-11-21

## 2023-07-26 RX ORDER — POTASSIUM CHLORIDE 750 MG/1
10 TABLET, EXTENDED RELEASE ORAL DAILY
Qty: 30 TABLET | Refills: 2 | Status: SHIPPED | OUTPATIENT
Start: 2023-07-26 | End: 2023-10-24

## 2023-07-26 RX ORDER — SIMVASTATIN 20 MG/1
TABLET, FILM COATED ORAL
Qty: 30 TABLET | Refills: 3 | Status: SHIPPED | OUTPATIENT
Start: 2023-07-26 | End: 2023-12-06

## 2023-08-04 ENCOUNTER — HOSPITAL ENCOUNTER (OUTPATIENT)
Dept: RADIOLOGY | Facility: HOSPITAL | Age: 63
Discharge: HOME | End: 2023-08-04
Attending: INTERNAL MEDICINE
Payer: MEDICAID

## 2023-08-04 DIAGNOSIS — Z01.818 PRE-OP EVALUATION: Primary | ICD-10-CM

## 2023-08-04 DIAGNOSIS — Z01.811 PRE-OP CHEST EXAM: ICD-10-CM

## 2023-08-04 PROCEDURE — 71046 X-RAY EXAM CHEST 2 VIEWS: CPT | Mod: TC

## 2023-08-07 ENCOUNTER — ANESTHESIA EVENT (OUTPATIENT)
Dept: SURGERY | Facility: HOSPITAL | Age: 63
End: 2023-08-07
Payer: MEDICAID

## 2023-08-07 NOTE — ANESTHESIA PREPROCEDURE EVALUATION
08/07/2023  Antonina Jaimes is a 62 y.o., female.      Pre-op Assessment    I have reviewed the Patient Summary Reports.     I have reviewed the Nursing Notes. I have reviewed the NPO Status.   I have reviewed the Medications.     Review of Systems  Anesthesia Hx:  Denies Family Hx of Anesthesia complications.   Denies Personal Hx of Anesthesia complications.   Hematology/Oncology:  Hematology Normal   Oncology Normal     EENT/Dental:EENT/Dental Normal   Cardiovascular:   Hypertension, well controlled    Pulmonary:   COPD, moderate    Renal/:  Renal/ Normal     Hepatic/GI:   GERD, well controlled    Musculoskeletal:  Musculoskeletal Normal    Neurological:  Neurology Normal    Endocrine:  Endocrine Normal    Dermatological:  Skin Normal    Psych:  Psychiatric Normal           Physical Exam  General: Cooperative, Alert and Oriented    Airway:  Mallampati: II   Mouth Opening: Normal  TM Distance: Normal  Tongue: Normal  Neck ROM: Normal ROM    Dental:  Intact        Anesthesia Plan  Type of Anesthesia, risks & benefits discussed:    Anesthesia Type: Gen Natural Airway  Intra-op Monitoring Plan: Standard ASA Monitors  Post Op Pain Control Plan: multimodal analgesia  Induction:  IV  Informed Consent: Informed consent signed with the Patient and all parties understand the risks and agree with anesthesia plan.  All questions answered. Patient consented to blood products? Yes  ASA Score: 3    Ready For Surgery From Anesthesia Perspective.     .

## 2023-08-08 ENCOUNTER — HOSPITAL ENCOUNTER (OUTPATIENT)
Facility: HOSPITAL | Age: 63
Discharge: HOME | End: 2023-08-08
Attending: INTERNAL MEDICINE | Admitting: INTERNAL MEDICINE
Payer: MEDICAID

## 2023-08-08 ENCOUNTER — ANESTHESIA (OUTPATIENT)
Dept: SURGERY | Facility: HOSPITAL | Age: 63
End: 2023-08-08
Payer: MEDICAID

## 2023-08-08 VITALS
BODY MASS INDEX: 20.7 KG/M2 | DIASTOLIC BLOOD PRESSURE: 73 MMHG | SYSTOLIC BLOOD PRESSURE: 148 MMHG | HEART RATE: 64 BPM | WEIGHT: 116.88 LBS | RESPIRATION RATE: 18 BRPM | OXYGEN SATURATION: 96 % | TEMPERATURE: 97 F

## 2023-08-08 DIAGNOSIS — Z12.11 SCREENING FOR COLON CANCER: ICD-10-CM

## 2023-08-08 DIAGNOSIS — R85.9 ABNORMAL PERITONEAL FLUID: ICD-10-CM

## 2023-08-08 DIAGNOSIS — Z12.11 SCREEN FOR COLON CANCER: ICD-10-CM

## 2023-08-08 DIAGNOSIS — Z86.010 HX OF COLONIC POLYPS: ICD-10-CM

## 2023-08-08 PROCEDURE — 45385 COLONOSCOPY W/LESION REMOVAL: CPT | Performed by: INTERNAL MEDICINE

## 2023-08-08 PROCEDURE — 63600175 PHARM REV CODE 636 W HCPCS: Performed by: NURSE ANESTHETIST, CERTIFIED REGISTERED

## 2023-08-08 PROCEDURE — 37000009 HC ANESTHESIA EA ADD 15 MINS: Performed by: INTERNAL MEDICINE

## 2023-08-08 PROCEDURE — 37000008 HC ANESTHESIA 1ST 15 MINUTES: Performed by: INTERNAL MEDICINE

## 2023-08-08 PROCEDURE — 25000003 PHARM REV CODE 250: Performed by: NURSE ANESTHETIST, CERTIFIED REGISTERED

## 2023-08-08 PROCEDURE — D9220A PRA ANESTHESIA: ICD-10-PCS | Mod: ,,, | Performed by: NURSE ANESTHETIST, CERTIFIED REGISTERED

## 2023-08-08 PROCEDURE — D9220A PRA ANESTHESIA: Mod: ,,, | Performed by: NURSE ANESTHETIST, CERTIFIED REGISTERED

## 2023-08-08 PROCEDURE — 00811 ANES LWR INTST NDSC NOS: CPT | Performed by: INTERNAL MEDICINE

## 2023-08-08 PROCEDURE — 63600175 PHARM REV CODE 636 W HCPCS: Performed by: ANESTHESIOLOGY

## 2023-08-08 PROCEDURE — 88305 TISSUE EXAM BY PATHOLOGIST: CPT | Performed by: INTERNAL MEDICINE

## 2023-08-08 PROCEDURE — 27201423 OPTIME MED/SURG SUP & DEVICES STERILE SUPPLY: Performed by: INTERNAL MEDICINE

## 2023-08-08 RX ORDER — LIDOCAINE HYDROCHLORIDE 10 MG/ML
1 INJECTION, SOLUTION EPIDURAL; INFILTRATION; INTRACAUDAL; PERINEURAL ONCE
Status: DISCONTINUED | OUTPATIENT
Start: 2023-08-08 | End: 2023-08-08 | Stop reason: HOSPADM

## 2023-08-08 RX ORDER — SODIUM CHLORIDE, SODIUM LACTATE, POTASSIUM CHLORIDE, CALCIUM CHLORIDE 600; 310; 30; 20 MG/100ML; MG/100ML; MG/100ML; MG/100ML
INJECTION, SOLUTION INTRAVENOUS CONTINUOUS
Status: DISCONTINUED | OUTPATIENT
Start: 2023-08-08 | End: 2023-08-08 | Stop reason: HOSPADM

## 2023-08-08 RX ORDER — LIDOCAINE HYDROCHLORIDE 20 MG/ML
INJECTION, SOLUTION EPIDURAL; INFILTRATION; INTRACAUDAL; PERINEURAL
Status: DISCONTINUED | OUTPATIENT
Start: 2023-08-08 | End: 2023-08-08

## 2023-08-08 RX ORDER — PROPOFOL 10 MG/ML
VIAL (ML) INTRAVENOUS
Status: DISCONTINUED | OUTPATIENT
Start: 2023-08-08 | End: 2023-08-08

## 2023-08-08 RX ADMIN — LIDOCAINE HYDROCHLORIDE 20 MG: 20 INJECTION, SOLUTION EPIDURAL; INFILTRATION; INTRACAUDAL; PERINEURAL at 08:08

## 2023-08-08 RX ADMIN — PROPOFOL 50 MG: 10 INJECTION, EMULSION INTRAVENOUS at 08:08

## 2023-08-08 RX ADMIN — SODIUM CHLORIDE, POTASSIUM CHLORIDE, SODIUM LACTATE AND CALCIUM CHLORIDE: 600; 310; 30; 20 INJECTION, SOLUTION INTRAVENOUS at 07:08

## 2023-08-08 RX ADMIN — PROPOFOL 100 MG: 10 INJECTION, EMULSION INTRAVENOUS at 08:08

## 2023-08-08 RX ADMIN — PROPOFOL 30 MG: 10 INJECTION, EMULSION INTRAVENOUS at 08:08

## 2023-08-08 NOTE — PROCEDURES
Antonina Jaimes is a 62 y.o. female patient.    Temp: 97 °F (36.1 °C) (08/08/23 0845)  Pulse: 65 (08/08/23 0845)  Resp: 16 (08/08/23 0845)  BP: 128/66 (08/08/23 0845)  SpO2: 96 % (08/08/23 0730)  Weight: 53 kg (116 lb 13.5 oz) (08/04/23 1039)       Procedures    8/8/2023    Screening colonoscopy    Indication for procedure:  Age-appropriate health maintenance, positive Cologuard      Procedure in detail:  After informed consent was obtained the risks and benefits were explained and she agreed that the procedure done.  She signed the consents was wheeled into the endoscopy area at [facility].  She was connected to the hemodynamic monitoring systems and monitored and sedated as per Anesthesia and note that Stanley Herron was the CRNA and present during the entire interaction with this patient.  Once adequate sedation was given a load rectal exam was done which revealed no obvious abnormalities.  At this time the Olympus colonoscope was lubricated advanced with ease from the anus to the cecum.  The cecum was identified by the ileocecal valve and the appendiceal orifice.  I passed the scope through the ileocecal valve into the terminal ileum and took a picture and noted no abnormalities.  I have flex scope back into the cecum and insufflated air and noted a 2 cm to 2.5 cm sessile polyp in the periappendiceal portion of the cecum.  I deployed the snare and was fortunate enough to get the entire polyp into the snare and performed hot snare polypectomy and entire polyp was able to be removed in 1 cut and retrieved into the polyp trap in piecemeal formation.  Once hemostasis was observed I slowly withdrew the scope revealing the rest of the cecum and ileocecal valve were normal.  Has slowly continued to withdraw the scope taken circumferential views noting scant diverticula in ascending colon the hepatic flexure had a redundant loop in it but there were no obvious abnormalities and  then the transverse colon was normal.  I continued to withdraw noting a normal splenic flexure and normal descending colon.  She had a floppy redundant sigmoid pictures were showing the decreased luminal size due to the redundant loops.  As I withdrew the scope into the rectal vault at approximately 15 cm from 10-15 cm there were several diminutive polyps.  I took a biopsy the 2 larger ones ones that were less than half a cm each and collected them in the same polyp jar.  I retroflexed revealing had normal internal anal sphincter place complain neutral position withdrew.  Patient tolerated procedure well there were no complications and less than 1 cc of blood loss.        Postop diagnosis  Large sessile polyp in the periappendiceal area of the cecum status post hot snare polypectomy  Scant diverticula of the ascending colon  Redundant bowel loops of the sigmoid colon  Diminutive polyps at the 10-15 cm of scope status post polypectomy x2      Recommendations:    Follow-up in my clinic in 1-2 weeks that time a make further recommendations        Carbon copy:  Dr. Donis Mcneil MD

## 2023-08-08 NOTE — ANESTHESIA POSTPROCEDURE EVALUATION
Anesthesia Post Evaluation    Patient: Antonina Jaimes    Procedure(s) Performed: Procedure(s) (LRB):  COLONOSCOPY (N/A)  COLONOSCOPY, WITH POLYPECTOMY USING SNARE (N/A)  COLONOSCOPY, WITH POLYPECTOMY USING HOT BIOPSY FORCEPS (N/A)    Final Anesthesia Type: general      Patient participation: Yes- Able to Participate  Level of consciousness: awake and alert and oriented  Post-procedure vital signs: reviewed and stable  Pain management: adequate  Airway patency: patent    PONV status at discharge: No PONV  Anesthetic complications: no      Cardiovascular status: stable  Respiratory status: unassisted, spontaneous ventilation and room air  Hydration status: euvolemic  Follow-up not needed.          Vitals Value Taken Time   /73 08/08/23 0730   Temp 36.6 °C (97.9 °F) 08/08/23 0730   Pulse 68 08/08/23 0730   Resp 18 08/08/23 0730   SpO2 96 % 08/08/23 0730         No case tracking events are documented in the log.      Pain/Priscila Score: No data recorded

## 2023-08-08 NOTE — DISCHARGE INSTRUCTIONS
INSTRUCTIONS  AFTER A COLONOSCOPY/EGD                                                                                    NO DRIVING X 24 HOURS. NOTIFY YOUR DOCTOR WITH                                                                                 ABDOMINAL PAIN UNRELIEVED BY  PASSING GAS,                                                                           FEVER WITHIN 24 HOURS, OR LARGE AMOUNT OF BLEEDING.

## 2023-08-08 NOTE — DISCHARGE SUMMARY
[facility]  Discharge Note  Short Stay    Procedure(s) (LRB):  COLONOSCOPY (N/A)  COLONOSCOPY, WITH POLYPECTOMY USING SNARE (N/A)  COLONOSCOPY, WITH POLYPECTOMY USING HOT BIOPSY FORCEPS (N/A)      OUTCOME: Patient tolerated treatment/procedure well without complication and is now ready for discharge.    DISPOSITION: Home or Self Care    FINAL DIAGNOSIS:  <principal problem not specified>    FOLLOWUP: In clinic    DISCHARGE INSTRUCTIONS:  No discharge procedures on file.     TIME SPENT ON DISCHARGE: 10 minutes

## 2023-08-09 LAB — PSYCHE PATHOLOGY RESULT: NORMAL

## 2023-08-28 ENCOUNTER — HOSPITAL ENCOUNTER (EMERGENCY)
Facility: HOSPITAL | Age: 63
Discharge: HOME | End: 2023-08-28
Attending: EMERGENCY MEDICINE
Payer: MEDICAID

## 2023-08-28 VITALS
TEMPERATURE: 99 F | BODY MASS INDEX: 20.2 KG/M2 | SYSTOLIC BLOOD PRESSURE: 137 MMHG | HEART RATE: 74 BPM | WEIGHT: 114 LBS | OXYGEN SATURATION: 98 % | DIASTOLIC BLOOD PRESSURE: 74 MMHG | RESPIRATION RATE: 18 BRPM | HEIGHT: 63 IN

## 2023-08-28 DIAGNOSIS — R07.9 CHEST PAIN, UNSPECIFIED TYPE: Primary | ICD-10-CM

## 2023-08-28 DIAGNOSIS — R07.9 CHEST PAIN: ICD-10-CM

## 2023-08-28 LAB
ALBUMIN SERPL-MCNC: 4.3 G/DL (ref 3.4–4.8)
ALBUMIN/GLOB SERPL: 1.5 RATIO (ref 1.1–2)
ALP SERPL-CCNC: 60 UNIT/L (ref 40–150)
ALT SERPL-CCNC: 11 UNIT/L (ref 0–55)
AST SERPL-CCNC: 18 UNIT/L (ref 5–34)
BASOPHILS # BLD AUTO: 0.05 X10(3)/MCL
BASOPHILS NFR BLD AUTO: 0.7 %
BILIRUB SERPL-MCNC: 0.2 MG/DL
BUN SERPL-MCNC: 8 MG/DL (ref 9.8–20.1)
CALCIUM SERPL-MCNC: 9.6 MG/DL (ref 8.4–10.2)
CHLORIDE SERPL-SCNC: 103 MMOL/L (ref 98–107)
CO2 SERPL-SCNC: 25 MMOL/L (ref 23–31)
CREAT SERPL-MCNC: 0.7 MG/DL (ref 0.55–1.02)
EOSINOPHIL # BLD AUTO: 0.23 X10(3)/MCL (ref 0–0.9)
EOSINOPHIL NFR BLD AUTO: 3.3 %
ERYTHROCYTE [DISTWIDTH] IN BLOOD BY AUTOMATED COUNT: 12 % (ref 11.5–17)
GFR SERPLBLD CREATININE-BSD FMLA CKD-EPI: >60 MLS/MIN/1.73/M2
GLOBULIN SER-MCNC: 2.8 GM/DL (ref 2.4–3.5)
GLUCOSE SERPL-MCNC: 121 MG/DL (ref 82–115)
HCT VFR BLD AUTO: 35.2 % (ref 37–47)
HGB BLD-MCNC: 11.3 G/DL (ref 12–16)
IMM GRANULOCYTES # BLD AUTO: 0.01 X10(3)/MCL (ref 0–0.04)
IMM GRANULOCYTES NFR BLD AUTO: 0.1 %
LYMPHOCYTES # BLD AUTO: 2.39 X10(3)/MCL (ref 0.6–4.6)
LYMPHOCYTES NFR BLD AUTO: 34.4 %
MCH RBC QN AUTO: 29.7 PG (ref 27–31)
MCHC RBC AUTO-ENTMCNC: 32.1 G/DL (ref 33–36)
MCV RBC AUTO: 92.4 FL (ref 80–94)
MONOCYTES # BLD AUTO: 0.63 X10(3)/MCL (ref 0.1–1.3)
MONOCYTES NFR BLD AUTO: 9.1 %
NEUTROPHILS # BLD AUTO: 3.64 X10(3)/MCL (ref 2.1–9.2)
NEUTROPHILS NFR BLD AUTO: 52.4 %
NRBC BLD AUTO-RTO: 0 %
PLATELET # BLD AUTO: 279 X10(3)/MCL (ref 130–400)
PMV BLD AUTO: 8.9 FL (ref 7.4–10.4)
POTASSIUM SERPL-SCNC: 4.1 MMOL/L (ref 3.5–5.1)
PROT SERPL-MCNC: 7.1 GM/DL (ref 5.8–7.6)
RBC # BLD AUTO: 3.81 X10(6)/MCL (ref 4.2–5.4)
SARS-COV-2 RDRP RESP QL NAA+PROBE: NEGATIVE
SODIUM SERPL-SCNC: 139 MMOL/L (ref 136–145)
TROPONIN I SERPL-MCNC: <0.01 NG/ML (ref 0–0.04)
WBC # SPEC AUTO: 6.95 X10(3)/MCL (ref 4.5–11.5)

## 2023-08-28 PROCEDURE — 84484 ASSAY OF TROPONIN QUANT: CPT | Performed by: EMERGENCY MEDICINE

## 2023-08-28 PROCEDURE — 85025 COMPLETE CBC W/AUTO DIFF WBC: CPT | Performed by: EMERGENCY MEDICINE

## 2023-08-28 PROCEDURE — 80053 COMPREHEN METABOLIC PANEL: CPT | Performed by: EMERGENCY MEDICINE

## 2023-08-28 PROCEDURE — 93005 ELECTROCARDIOGRAM TRACING: CPT

## 2023-08-28 PROCEDURE — 99285 EMERGENCY DEPT VISIT HI MDM: CPT | Mod: 25

## 2023-08-28 PROCEDURE — 87635 SARS-COV-2 COVID-19 AMP PRB: CPT | Performed by: EMERGENCY MEDICINE

## 2023-08-28 RX ORDER — SODIUM, POTASSIUM,MAG SULFATES 17.5-3.13G
SOLUTION, RECONSTITUTED, ORAL ORAL
COMMUNITY
Start: 2023-08-01

## 2023-08-28 RX ORDER — ALBUTEROL SULFATE 2.5 MG/.5ML
2.5 SOLUTION RESPIRATORY (INHALATION) EVERY 4 HOURS PRN
Qty: 30 EACH | Refills: 1 | Status: SHIPPED | OUTPATIENT
Start: 2023-08-28 | End: 2024-08-27

## 2023-08-28 RX ORDER — METHYLPREDNISOLONE 4 MG/1
TABLET ORAL
Qty: 21 EACH | Refills: 0 | Status: SHIPPED | OUTPATIENT
Start: 2023-08-28 | End: 2023-09-18

## 2023-08-29 NOTE — ED PROVIDER NOTES
Encounter Date: 8/28/2023       History     Chief Complaint   Patient presents with    Chest Pain     C/o mid sternal CP x 1 week. Symptoms include SOB and cough. Pt states she has been using her nebulizer bc she thought it was bronchitis dt her getting it every year. Denies NVD. Hx of COPD.     Patient is a 62-year-old female presenting with complain of anterior chest pain times 6-7 days.  Patient also complains of a cough with slight amount of yellowish sputum.  Patient states chest pain worsens with inspiration.  Patient denies shortness of breath.  No nausea vomiting.  No abdominal pain.  Patient is a smoker.  Patient has a nebulizer at home, she states he took a breathing treatment prior to arrival.      Review of patient's allergies indicates:  No Known Allergies  Past Medical History:   Diagnosis Date    CHF (congestive heart failure)     COPD (chronic obstructive pulmonary disease)     GERD (gastroesophageal reflux disease)     Hyperlipidemia     Hypertension     Hypokalemia     Insomnia     Nicotine dependence     Osteoporosis      Past Surgical History:   Procedure Laterality Date    ADENOIDECTOMY      COLONOSCOPY N/A 8/8/2023    Procedure: COLONOSCOPY;  Surgeon: Mikhail Marcano MD;  Location: [facility];  Service: Endoscopy;  Laterality: N/A;  Scant ascending colon diverticular disease   sigmoid diverticular     COLONOSCOPY, WITH POLYPECTOMY USING HOT BIOPSY FORCEPS N/A 8/8/2023    Procedure: COLONOSCOPY, WITH POLYPECTOMY USING HOT BIOPSY FORCEPS;  Surgeon: Mikhail Marcano MD;  Location: [facility];  Service: Endoscopy;  Laterality: N/A;  B. Polyp at 10-15cm; no heat     COLONOSCOPY, WITH POLYPECTOMY USING SNARE N/A 8/8/2023    Procedure: COLONOSCOPY, WITH POLYPECTOMY USING SNARE;  Surgeon: Mikhail Marcano MD;  Location: [facility];  Service: Endoscopy;  Laterality: N/A;  A. appendicle polyp       EGD, WITH CLOSED BIOPSY  07/15/2023    Procedure: EGD, WITH CLOSED BIOPSY;  Surgeon: Tyree  MD Tracie;  Location: [facility];  Service: Gastroenterology;;    EGD, WITH HEMORRHAGE CONTROL  07/15/2023    Procedure: EGD,WITH HEMORRHAGE CONTROL;  Surgeon: Tyree Hodges MD;  Location: [facility] OR;  Service: Gastroenterology;;  gold probe 7FR    ESOPHAGOGASTRODUODENOSCOPY N/A 07/15/2023    Procedure: EGD (ESOPHAGOGASTRODUODENOSCOPY);  Surgeon: Tyree Hodges MD;  Location: [facility] OR;  Service: Gastroenterology;  Laterality: N/A;    HEMORRHOID SURGERY      HYSTERECTOMY      TONSILLECTOMY       Family History   Problem Relation Age of Onset    Hypertension Mother     Diabetes Mother     Hypertension Father      Social History     Tobacco Use    Smoking status: Every Day     Current packs/day: 0.50     Types: Cigarettes    Smokeless tobacco: Never   Substance Use Topics    Alcohol use: Yes     Alcohol/week: 2.0 standard drinks of alcohol     Types: 2 Cans of beer per week    Drug use: Never     Review of Systems   Constitutional: Negative.    HENT: Negative.     Respiratory:  Negative for cough and shortness of breath.    Cardiovascular:  Positive for chest pain. Negative for palpitations and leg swelling.   Gastrointestinal: Negative.    Genitourinary: Negative.    Musculoskeletal: Negative.    Neurological: Negative.    Psychiatric/Behavioral: Negative.         Physical Exam     Initial Vitals [08/28/23 1900]   BP Pulse Resp Temp SpO2   (!) 146/77 97 18 98.9 °F (37.2 °C) 96 %      MAP       --         Physical Exam    Nursing note and vitals reviewed.  Constitutional: She appears well-developed and well-nourished.   HENT:   Head: Normocephalic.   Neck: Neck supple.   Normal range of motion.  Cardiovascular:  Normal rate, regular rhythm and normal heart sounds.           Pulmonary/Chest:   Patient has scant bilateral wheezing, mild bilateral rhonchi, good bilateral air movement, no tachypnea, no accessory muscle use.   Musculoskeletal:         General: Normal range of motion.      Cervical back: Normal range of  motion and neck supple.     Neurological: She is alert and oriented to person, place, and time. She has normal strength.         ED Course   Procedures  Labs Reviewed   COMPREHENSIVE METABOLIC PANEL - Abnormal; Notable for the following components:       Result Value    Glucose Level 121 (*)     Blood Urea Nitrogen 8.0 (*)     All other components within normal limits   CBC WITH DIFFERENTIAL - Abnormal; Notable for the following components:    RBC 3.81 (*)     Hgb 11.3 (*)     Hct 35.2 (*)     MCHC 32.1 (*)     All other components within normal limits   TROPONIN I - Normal   SARS-COV-2 RNA AMPLIFICATION, QUAL - Normal    Narrative:     The IDNOW COVID-19 assay is a rapid molecular in vitro diagnostic test utilizing an isothermal nucleic acid amplification technology intended for the qualitative detection of nucleic acid from the SARS-CoV-2 viral RNA in direct nasal, nasopharyngeal or throat swabs from individuals who are suspected of COVID-19 by their healthcare provider.   CBC W/ AUTO DIFFERENTIAL    Narrative:     The following orders were created for panel order CBC auto differential.  Procedure                               Abnormality         Status                     ---------                               -----------         ------                     CBC with Differential[[id number]]        Abnormal            Final result                 Please view results for these tests on the individual orders.     EKG Readings: (Independently Interpreted)   Initial Reading: No STEMI. Rhythm: Normal Sinus Rhythm. Heart Rate: 81. Ectopy: No Ectopy. Conduction: Normal. ST Segments: Normal ST Segments. T Waves: Normal. Axis: Normal.     ECG Results              EKG 12-lead (In process)  Result time 08/28/23 19:38:35      In process by Interface, Lab In [facility] (08/28/23 19:38:35)                   Narrative:    Test Reason : R07.9,    Vent. Rate : 081 BPM     Atrial Rate : 081 BPM     P-R Int : 160 ms          QRS Dur :  076 ms      QT Int : 346 ms       P-R-T Axes : 073 014 065 degrees     QTc Int : 401 ms    Normal sinus rhythm  Normal ECG  When compared with ECG of 04-AUG-2023 13:09,  Criteria for Septal infarct are no longer Present    Referred By: [email or web address]   SELF           Confirmed By:                                   Imaging Results              X-Ray Chest AP Portable (Final result)  Result time 08/28/23 19:32:52      Final result by Tyree Santillan MD (08/28/23 19:32:52)                   Impression:      No acute findings.      Electronically signed by: Tyree Santillan  Date:    08/28/2023  Time:    19:32               Narrative:    EXAMINATION:  XR CHEST AP PORTABLE    CLINICAL HISTORY:  Chest pain;    COMPARISON:  4 August 2023    FINDINGS:  Portable frontal view of the chest was obtained. The heart is not enlarged.  Stable nodule right lung base.  No new focal consolidation or pneumothorax.  There are old rib deformities.                                       Medications - No data to display  Medical Decision Making  As per HPI.  Differential diagnosis, bronchitis, pneumonia, acute coronary syndrome, angina, musculoskeletal pain, pleurisy    Amount and/or Complexity of Data Reviewed  Labs: ordered.     Details: All labs are stable, COVID is negative  Radiology: ordered and independent interpretation performed.     Details: No acute changes on chest x-ray  ECG/medicine tests: ordered and independent interpretation performed.  Discussion of management or test interpretation with external provider(s): Workup negative.  Will DC with a prescription for Medrol Dosepak and albuterol.  Patient has a nebulizer at home.    Risk  Prescription drug management.                               Clinical Impression:   Final diagnoses:  [R07.9] Chest pain  [R07.9] Chest pain, unspecified type (Primary)        ED Disposition Condition    Discharge Stable          ED Prescriptions       Medication Sig Dispense Start Date End Date Auth.  Provider    methylPREDNISolone (MEDROL DOSEPACK) 4 mg tablet use as directed 21 each 8/28/2023 9/18/2023 Williams Díaz MD    albuterol sulfate 2.5 mg/0.5 mL Nebu Take 2.5 mg by nebulization every 4 (four) hours as needed (Cough or wheezing). Rescue 30 each 8/28/2023 8/27/2024 Williams Díaz MD          Follow-up Information       Follow up With Specialties Details Why Contact Info    [facility] Emergency Dept Emergency Medicine  As needed, If symptoms worsen [address] [facility] [id number] [phone]             Williams Díaz MD  08/28/23 1952       Williams Díaz MD  08/28/23 1953

## 2023-08-31 DIAGNOSIS — J44.9 CHRONIC OBSTRUCTIVE PULMONARY DISEASE, UNSPECIFIED COPD TYPE: Primary | ICD-10-CM

## 2023-08-31 RX ORDER — IPRATROPIUM BROMIDE AND ALBUTEROL SULFATE 2.5; .5 MG/3ML; MG/3ML
3 SOLUTION RESPIRATORY (INHALATION) EVERY 6 HOURS PRN
Qty: 75 ML | Refills: 1 | Status: SHIPPED | OUTPATIENT
Start: 2023-08-31 | End: 2024-02-01 | Stop reason: SDUPTHER

## 2023-08-31 RX ORDER — FLUTICASONE FUROATE AND VILANTEROL 100; 25 UG/1; UG/1
1 POWDER RESPIRATORY (INHALATION) DAILY
Qty: 30 EACH | Refills: 0 | Status: SHIPPED | OUTPATIENT
Start: 2023-08-31 | End: 2023-10-10

## 2023-09-19 ENCOUNTER — PATIENT MESSAGE (OUTPATIENT)
Dept: ADMINISTRATIVE | Facility: HOSPITAL | Age: 63
End: 2023-09-19
Payer: MEDICAID

## 2023-10-07 DIAGNOSIS — R07.9 CHEST PAIN, UNSPECIFIED TYPE: ICD-10-CM

## 2023-10-10 ENCOUNTER — OFFICE VISIT (OUTPATIENT)
Dept: FAMILY MEDICINE | Facility: CLINIC | Age: 63
End: 2023-10-10
Payer: MEDICAID

## 2023-10-10 VITALS
DIASTOLIC BLOOD PRESSURE: 82 MMHG | HEIGHT: 63 IN | TEMPERATURE: 98 F | HEART RATE: 85 BPM | OXYGEN SATURATION: 97 % | WEIGHT: 114 LBS | RESPIRATION RATE: 20 BRPM | SYSTOLIC BLOOD PRESSURE: 124 MMHG | BODY MASS INDEX: 20.2 KG/M2

## 2023-10-10 DIAGNOSIS — J44.9 CHRONIC OBSTRUCTIVE PULMONARY DISEASE, UNSPECIFIED COPD TYPE: ICD-10-CM

## 2023-10-10 DIAGNOSIS — G47.00 INSOMNIA, UNSPECIFIED TYPE: Primary | ICD-10-CM

## 2023-10-10 DIAGNOSIS — Z12.31 SCREENING MAMMOGRAM, ENCOUNTER FOR: ICD-10-CM

## 2023-10-10 PROCEDURE — 3079F PR MOST RECENT DIASTOLIC BLOOD PRESSURE 80-89 MM HG: ICD-10-PCS | Mod: CPTII,,, | Performed by: FAMILY MEDICINE

## 2023-10-10 PROCEDURE — 3008F BODY MASS INDEX DOCD: CPT | Mod: CPTII,,, | Performed by: FAMILY MEDICINE

## 2023-10-10 PROCEDURE — 3074F PR MOST RECENT SYSTOLIC BLOOD PRESSURE < 130 MM HG: ICD-10-PCS | Mod: CPTII,,, | Performed by: FAMILY MEDICINE

## 2023-10-10 PROCEDURE — 99214 PR OFFICE/OUTPT VISIT, EST, LEVL IV, 30-39 MIN: ICD-10-PCS | Mod: ,,, | Performed by: FAMILY MEDICINE

## 2023-10-10 PROCEDURE — 1160F PR REVIEW ALL MEDS BY PRESCRIBER/CLIN PHARMACIST DOCUMENTED: ICD-10-PCS | Mod: CPTII,,, | Performed by: FAMILY MEDICINE

## 2023-10-10 PROCEDURE — 4010F ACE/ARB THERAPY RXD/TAKEN: CPT | Mod: CPTII,,, | Performed by: FAMILY MEDICINE

## 2023-10-10 PROCEDURE — 1159F MED LIST DOCD IN RCRD: CPT | Mod: CPTII,,, | Performed by: FAMILY MEDICINE

## 2023-10-10 PROCEDURE — 3074F SYST BP LT 130 MM HG: CPT | Mod: CPTII,,, | Performed by: FAMILY MEDICINE

## 2023-10-10 PROCEDURE — 4010F PR ACE/ARB THEARPY RXD/TAKEN: ICD-10-PCS | Mod: CPTII,,, | Performed by: FAMILY MEDICINE

## 2023-10-10 PROCEDURE — 1160F RVW MEDS BY RX/DR IN RCRD: CPT | Mod: CPTII,,, | Performed by: FAMILY MEDICINE

## 2023-10-10 PROCEDURE — 99214 OFFICE O/P EST MOD 30 MIN: CPT | Mod: ,,, | Performed by: FAMILY MEDICINE

## 2023-10-10 PROCEDURE — 3079F DIAST BP 80-89 MM HG: CPT | Mod: CPTII,,, | Performed by: FAMILY MEDICINE

## 2023-10-10 PROCEDURE — 3008F PR BODY MASS INDEX (BMI) DOCUMENTED: ICD-10-PCS | Mod: CPTII,,, | Performed by: FAMILY MEDICINE

## 2023-10-10 PROCEDURE — 1159F PR MEDICATION LIST DOCUMENTED IN MEDICAL RECORD: ICD-10-PCS | Mod: CPTII,,, | Performed by: FAMILY MEDICINE

## 2023-10-10 RX ORDER — PROMETHAZINE HYDROCHLORIDE AND DEXTROMETHORPHAN HYDROBROMIDE 6.25; 15 MG/5ML; MG/5ML
5 SYRUP ORAL EVERY 4 HOURS PRN
Qty: 200 ML | Refills: 0 | Status: SHIPPED | OUTPATIENT
Start: 2023-10-10 | End: 2023-10-20

## 2023-10-10 RX ORDER — DIGOXIN 125 MCG
TABLET ORAL
Qty: 30 TABLET | Refills: 3 | Status: SHIPPED | OUTPATIENT
Start: 2023-10-10 | End: 2024-02-14

## 2023-10-10 NOTE — PROGRESS NOTES
"Subjective:       Patient ID: Antonina Jaimes is a 63 y.o. female.    Chief Complaint: 4 MONTH FOLLOW UP and Medication Management      Routine        Review of Systems   Constitutional: Negative.    Respiratory:  Positive for cough.         COPD: no recent flares, reports medication no longer covered   Cardiovascular: Negative.    Gastrointestinal: Negative.    Psychiatric/Behavioral: Negative.          Insomnia: reports increased Trazodone to 50 mg qhs           Objective:      /82 (BP Location: Left arm, Patient Position: Sitting, BP Method: Large (Manual))   Pulse 85   Temp 97.6 °F (36.4 °C)   Resp 20   Ht 5' 3" (1.6 m)   Wt 51.7 kg (114 lb)   SpO2 97%   BMI 20.19 kg/m²    Physical Exam  Constitutional:       Appearance: Normal appearance.   Cardiovascular:      Rate and Rhythm: Normal rate and regular rhythm.      Heart sounds: Normal heart sounds.   Pulmonary:      Effort: Pulmonary effort is normal.      Breath sounds: Normal breath sounds.   Neurological:      Mental Status: She is alert.   Psychiatric:         Mood and Affect: Mood normal.         Behavior: Behavior normal.         Thought Content: Thought content normal.         Judgment: Judgment normal.               Assessment:       Problem List Items Addressed This Visit          Pulmonary    Chronic obstructive pulmonary disease     Other Visit Diagnoses       Insomnia, unspecified type    -  Primary    Screening mammogram, encounter for                   Plan:   1. Insomnia, unspecified type  Increase trazodone to 50 mg q.h.s.  Sleep hygiene  Monitor  Return to clinic with any concerns     2. Chronic obstructive pulmonary disease, unspecified COPD type  Stop Breo   Start Trelegy (samples provided)   Rx sent to pharmacy   Monitor  Return to clinic with any concerns     3. Screening mammogram, encounter for  Patient to schedule mammogram             "

## 2023-10-16 PROBLEM — K92.2 GASTROINTESTINAL HEMORRHAGE: Status: RESOLVED | Noted: 2023-07-14 | Resolved: 2023-10-16

## 2023-10-24 DIAGNOSIS — E87.6 POTASSIUM (K) DEFICIENCY: ICD-10-CM

## 2023-10-24 RX ORDER — POTASSIUM CHLORIDE 750 MG/1
10 TABLET, EXTENDED RELEASE ORAL
Qty: 30 TABLET | Refills: 2 | Status: SHIPPED | OUTPATIENT
Start: 2023-10-24

## 2023-11-21 DIAGNOSIS — I10 HYPERTENSION, UNSPECIFIED TYPE: ICD-10-CM

## 2023-11-21 RX ORDER — ENALAPRIL MALEATE 5 MG/1
2.5 TABLET ORAL
Qty: 15 TABLET | Refills: 3 | Status: SHIPPED | OUTPATIENT
Start: 2023-11-21 | End: 2024-03-26

## 2023-12-06 DIAGNOSIS — E78.5 HYPERLIPIDEMIA, UNSPECIFIED HYPERLIPIDEMIA TYPE: ICD-10-CM

## 2023-12-06 RX ORDER — SIMVASTATIN 20 MG/1
TABLET, FILM COATED ORAL
Qty: 30 TABLET | Refills: 3 | Status: SHIPPED | OUTPATIENT
Start: 2023-12-06 | End: 2024-04-03

## 2023-12-18 DIAGNOSIS — G47.00 INSOMNIA, UNSPECIFIED TYPE: ICD-10-CM

## 2023-12-18 RX ORDER — TRAZODONE HYDROCHLORIDE 50 MG/1
50 TABLET ORAL NIGHTLY
Qty: 30 TABLET | Refills: 3 | Status: SHIPPED | OUTPATIENT
Start: 2023-12-18

## 2024-01-21 ENCOUNTER — HOSPITAL ENCOUNTER (INPATIENT)
Facility: HOSPITAL | Age: 64
LOS: 4 days | Discharge: HOME | DRG: 522 | End: 2024-01-25
Attending: EMERGENCY MEDICINE | Admitting: EMERGENCY MEDICINE
Payer: MEDICAID

## 2024-01-21 ENCOUNTER — HOSPITAL ENCOUNTER (EMERGENCY)
Facility: HOSPITAL | Age: 64
End: 2024-01-21
Attending: EMERGENCY MEDICINE
Payer: MEDICAID

## 2024-01-21 VITALS
DIASTOLIC BLOOD PRESSURE: 106 MMHG | RESPIRATION RATE: 20 BRPM | TEMPERATURE: 98 F | HEIGHT: 63 IN | SYSTOLIC BLOOD PRESSURE: 142 MMHG | OXYGEN SATURATION: 93 % | BODY MASS INDEX: 20.38 KG/M2 | HEART RATE: 93 BPM | WEIGHT: 115 LBS

## 2024-01-21 DIAGNOSIS — S72.002A CLOSED FRACTURE OF LEFT HIP, INITIAL ENCOUNTER: Primary | ICD-10-CM

## 2024-01-21 DIAGNOSIS — S72.002A CLOSED FRACTURE OF NECK OF LEFT FEMUR, INITIAL ENCOUNTER: Primary | ICD-10-CM

## 2024-01-21 DIAGNOSIS — F10.920 ALCOHOLIC INTOXICATION WITHOUT COMPLICATION: ICD-10-CM

## 2024-01-21 DIAGNOSIS — R07.9 CHEST PAIN: ICD-10-CM

## 2024-01-21 DIAGNOSIS — Z01.818 PREOP TESTING: ICD-10-CM

## 2024-01-21 DIAGNOSIS — M25.552 LEFT HIP PAIN: ICD-10-CM

## 2024-01-21 LAB
ALBUMIN SERPL-MCNC: 4.4 G/DL (ref 3.4–4.8)
ALBUMIN/GLOB SERPL: 1.3 RATIO (ref 1.1–2)
ALP SERPL-CCNC: 78 UNIT/L (ref 40–150)
ALT SERPL-CCNC: 21 UNIT/L (ref 0–55)
AMPHET UR QL SCN: NEGATIVE
APPEARANCE UR: CLEAR
APTT PPP: 27.6 SECONDS (ref 21.4–28.3)
APTT PPP: 34.5 SECONDS (ref 24.6–37.2)
AST SERPL-CCNC: 27 UNIT/L (ref 5–34)
BACTERIA #/AREA URNS AUTO: ABNORMAL /HPF
BARBITURATE SCN PRESENT UR: NEGATIVE
BASOPHILS # BLD AUTO: 0.04 X10(3)/MCL
BASOPHILS NFR BLD AUTO: 0.7 %
BENZODIAZ UR QL SCN: NEGATIVE
BILIRUB SERPL-MCNC: 0.3 MG/DL
BILIRUB UR QL STRIP.AUTO: NEGATIVE
BUN SERPL-MCNC: 3 MG/DL (ref 9.8–20.1)
CALCIUM SERPL-MCNC: 9 MG/DL (ref 8.4–10.2)
CANNABINOIDS UR QL SCN: NEGATIVE
CHLORIDE SERPL-SCNC: 95 MMOL/L (ref 98–107)
CO2 SERPL-SCNC: 28 MMOL/L (ref 23–31)
COCAINE UR QL SCN: NEGATIVE
COLOR UR AUTO: YELLOW
CREAT SERPL-MCNC: 0.61 MG/DL (ref 0.55–1.02)
EOSINOPHIL # BLD AUTO: 0.13 X10(3)/MCL (ref 0–0.9)
EOSINOPHIL NFR BLD AUTO: 2.2 %
ERYTHROCYTE [DISTWIDTH] IN BLOOD BY AUTOMATED COUNT: 12.3 % (ref 11.5–17)
ETHANOL SERPL-MCNC: 329 MG/DL
FENTANYL UR QL SCN: NEGATIVE
GFR SERPLBLD CREATININE-BSD FMLA CKD-EPI: >60 MLS/MIN/1.73/M2
GLOBULIN SER-MCNC: 3.4 GM/DL (ref 2.4–3.5)
GLUCOSE SERPL-MCNC: 109 MG/DL (ref 82–115)
GLUCOSE UR QL STRIP.AUTO: NEGATIVE
HCT VFR BLD AUTO: 44.8 % (ref 37–47)
HGB BLD-MCNC: 15 G/DL (ref 12–16)
IMM GRANULOCYTES # BLD AUTO: 0.03 X10(3)/MCL (ref 0–0.04)
IMM GRANULOCYTES NFR BLD AUTO: 0.5 %
INR PPP: 0.9
INR PPP: 1
KETONES UR QL STRIP.AUTO: NEGATIVE
LEUKOCYTE ESTERASE UR QL STRIP.AUTO: NEGATIVE
LYMPHOCYTES # BLD AUTO: 2.18 X10(3)/MCL (ref 0.6–4.6)
LYMPHOCYTES NFR BLD AUTO: 36.8 %
MCH RBC QN AUTO: 30.1 PG (ref 27–31)
MCHC RBC AUTO-ENTMCNC: 33.5 G/DL (ref 33–36)
MCV RBC AUTO: 90 FL (ref 80–94)
MDMA UR QL SCN: NEGATIVE
MONOCYTES # BLD AUTO: 0.33 X10(3)/MCL (ref 0.1–1.3)
MONOCYTES NFR BLD AUTO: 5.6 %
MUCOUS THREADS URNS QL MICRO: ABNORMAL /LPF
NEUTROPHILS # BLD AUTO: 3.21 X10(3)/MCL (ref 2.1–9.2)
NEUTROPHILS NFR BLD AUTO: 54.2 %
NITRITE UR QL STRIP.AUTO: NEGATIVE
NRBC BLD AUTO-RTO: 0 %
OPIATES UR QL SCN: POSITIVE
PCP UR QL: NEGATIVE
PH UR STRIP.AUTO: 5.5 [PH]
PH UR: 5.5 [PH] (ref 3–11)
PLATELET # BLD AUTO: 275 X10(3)/MCL (ref 130–400)
PMV BLD AUTO: 8.8 FL (ref 7.4–10.4)
POTASSIUM SERPL-SCNC: 3.7 MMOL/L (ref 3.5–5.1)
PROT SERPL-MCNC: 7.8 GM/DL (ref 5.8–7.6)
PROT UR QL STRIP.AUTO: NEGATIVE
PROTHROMBIN TIME: 13.1 SECONDS (ref 12.5–14.5)
PROTHROMBIN TIME: 9.9 SECONDS (ref 9.1–10.9)
RBC # BLD AUTO: 4.98 X10(6)/MCL (ref 4.2–5.4)
RBC #/AREA URNS AUTO: ABNORMAL /HPF
RBC UR QL AUTO: ABNORMAL
SODIUM SERPL-SCNC: 135 MMOL/L (ref 136–145)
SP GR UR STRIP.AUTO: 1.01 (ref 1–1.03)
SPECIFIC GRAVITY, URINE AUTO (.000) (OHS): 1.01 (ref 1–1.03)
SQUAMOUS #/AREA URNS AUTO: ABNORMAL /HPF
UROBILINOGEN UR STRIP-ACNC: 0.2
WBC # SPEC AUTO: 5.92 X10(3)/MCL (ref 4.5–11.5)
WBC #/AREA URNS AUTO: ABNORMAL /HPF

## 2024-01-21 PROCEDURE — 85610 PROTHROMBIN TIME: CPT | Performed by: EMERGENCY MEDICINE

## 2024-01-21 PROCEDURE — 99285 EMERGENCY DEPT VISIT HI MDM: CPT | Mod: 25,27

## 2024-01-21 PROCEDURE — 85730 THROMBOPLASTIN TIME PARTIAL: CPT | Performed by: EMERGENCY MEDICINE

## 2024-01-21 PROCEDURE — 11000001 HC ACUTE MED/SURG PRIVATE ROOM

## 2024-01-21 PROCEDURE — 80053 COMPREHEN METABOLIC PANEL: CPT | Performed by: EMERGENCY MEDICINE

## 2024-01-21 PROCEDURE — 96375 TX/PRO/DX INJ NEW DRUG ADDON: CPT

## 2024-01-21 PROCEDURE — 82077 ASSAY SPEC XCP UR&BREATH IA: CPT | Performed by: EMERGENCY MEDICINE

## 2024-01-21 PROCEDURE — 85025 COMPLETE CBC W/AUTO DIFF WBC: CPT | Performed by: EMERGENCY MEDICINE

## 2024-01-21 PROCEDURE — 94761 N-INVAS EAR/PLS OXIMETRY MLT: CPT

## 2024-01-21 PROCEDURE — 99900031 HC PATIENT EDUCATION (STAT)

## 2024-01-21 PROCEDURE — 27000221 HC OXYGEN, UP TO 24 HOURS

## 2024-01-21 PROCEDURE — S4991 NICOTINE PATCH NONLEGEND: HCPCS | Performed by: EMERGENCY MEDICINE

## 2024-01-21 PROCEDURE — 63600175 PHARM REV CODE 636 W HCPCS: Performed by: EMERGENCY MEDICINE

## 2024-01-21 PROCEDURE — 81003 URINALYSIS AUTO W/O SCOPE: CPT | Performed by: EMERGENCY MEDICINE

## 2024-01-21 PROCEDURE — 63600175 PHARM REV CODE 636 W HCPCS

## 2024-01-21 PROCEDURE — 99285 EMERGENCY DEPT VISIT HI MDM: CPT | Mod: 25

## 2024-01-21 PROCEDURE — 94640 AIRWAY INHALATION TREATMENT: CPT

## 2024-01-21 PROCEDURE — 96374 THER/PROPH/DIAG INJ IV PUSH: CPT

## 2024-01-21 PROCEDURE — 63600175 PHARM REV CODE 636 W HCPCS: Performed by: INTERNAL MEDICINE

## 2024-01-21 PROCEDURE — 25000242 PHARM REV CODE 250 ALT 637 W/ HCPCS: Performed by: EMERGENCY MEDICINE

## 2024-01-21 PROCEDURE — 80307 DRUG TEST PRSMV CHEM ANLYZR: CPT | Performed by: EMERGENCY MEDICINE

## 2024-01-21 PROCEDURE — 25000003 PHARM REV CODE 250: Performed by: EMERGENCY MEDICINE

## 2024-01-21 PROCEDURE — 93010 ELECTROCARDIOGRAM REPORT: CPT | Mod: ,,, | Performed by: INTERNAL MEDICINE

## 2024-01-21 PROCEDURE — 51702 INSERT TEMP BLADDER CATH: CPT

## 2024-01-21 PROCEDURE — 93005 ELECTROCARDIOGRAM TRACING: CPT

## 2024-01-21 RX ORDER — LISINOPRIL 2.5 MG/1
2.5 TABLET ORAL DAILY
Status: DISCONTINUED | OUTPATIENT
Start: 2024-01-21 | End: 2024-01-25 | Stop reason: HOSPADM

## 2024-01-21 RX ORDER — ONDANSETRON HYDROCHLORIDE 2 MG/ML
4 INJECTION, SOLUTION INTRAVENOUS
Status: COMPLETED | OUTPATIENT
Start: 2024-01-21 | End: 2024-01-21

## 2024-01-21 RX ORDER — DIGOXIN 125 MCG
0.12 TABLET ORAL DAILY
Status: DISCONTINUED | OUTPATIENT
Start: 2024-01-22 | End: 2024-01-25 | Stop reason: HOSPADM

## 2024-01-21 RX ORDER — SODIUM CHLORIDE 0.9 % (FLUSH) 0.9 %
10 SYRINGE (ML) INJECTION EVERY 12 HOURS PRN
Status: DISCONTINUED | OUTPATIENT
Start: 2024-01-21 | End: 2024-01-25 | Stop reason: HOSPADM

## 2024-01-21 RX ORDER — ENOXAPARIN SODIUM 100 MG/ML
40 INJECTION SUBCUTANEOUS EVERY 24 HOURS
Status: DISCONTINUED | OUTPATIENT
Start: 2024-01-21 | End: 2024-01-22

## 2024-01-21 RX ORDER — ATORVASTATIN CALCIUM 20 MG/1
20 TABLET, FILM COATED ORAL DAILY
Status: DISCONTINUED | OUTPATIENT
Start: 2024-01-21 | End: 2024-01-25 | Stop reason: HOSPADM

## 2024-01-21 RX ORDER — MUPIROCIN 20 MG/G
OINTMENT TOPICAL 2 TIMES DAILY
Status: DISCONTINUED | OUTPATIENT
Start: 2024-01-21 | End: 2024-01-25 | Stop reason: HOSPADM

## 2024-01-21 RX ORDER — SODIUM CHLORIDE, SODIUM LACTATE, POTASSIUM CHLORIDE, CALCIUM CHLORIDE 600; 310; 30; 20 MG/100ML; MG/100ML; MG/100ML; MG/100ML
INJECTION, SOLUTION INTRAVENOUS CONTINUOUS
Status: DISCONTINUED | OUTPATIENT
Start: 2024-01-21 | End: 2024-01-25 | Stop reason: HOSPADM

## 2024-01-21 RX ORDER — IBUPROFEN 200 MG
1 TABLET ORAL DAILY
Status: DISCONTINUED | OUTPATIENT
Start: 2024-01-21 | End: 2024-01-25 | Stop reason: HOSPADM

## 2024-01-21 RX ORDER — GLUCAGON 1 MG
1 KIT INJECTION
Status: DISCONTINUED | OUTPATIENT
Start: 2024-01-21 | End: 2024-01-25 | Stop reason: HOSPADM

## 2024-01-21 RX ORDER — HYDROMORPHONE HYDROCHLORIDE 2 MG/ML
0.5 INJECTION, SOLUTION INTRAMUSCULAR; INTRAVENOUS; SUBCUTANEOUS
Status: DISCONTINUED | OUTPATIENT
Start: 2024-01-21 | End: 2024-01-22

## 2024-01-21 RX ORDER — HYDROCODONE BITARTRATE AND ACETAMINOPHEN 5; 325 MG/1; MG/1
1 TABLET ORAL EVERY 6 HOURS PRN
Status: DISCONTINUED | OUTPATIENT
Start: 2024-01-21 | End: 2024-01-25 | Stop reason: HOSPADM

## 2024-01-21 RX ORDER — MORPHINE SULFATE ORAL SOLUTION 10 MG/5ML
5 SOLUTION ORAL
Status: DISCONTINUED | OUTPATIENT
Start: 2024-01-21 | End: 2024-01-21

## 2024-01-21 RX ORDER — MORPHINE SULFATE 20 MG/ML
5 SOLUTION ORAL
Status: DISCONTINUED | OUTPATIENT
Start: 2024-01-21 | End: 2024-01-21

## 2024-01-21 RX ORDER — IBUPROFEN 200 MG
24 TABLET ORAL
Status: DISCONTINUED | OUTPATIENT
Start: 2024-01-21 | End: 2024-01-25 | Stop reason: HOSPADM

## 2024-01-21 RX ORDER — NALOXONE HCL 0.4 MG/ML
0.02 VIAL (ML) INJECTION
Status: DISCONTINUED | OUTPATIENT
Start: 2024-01-21 | End: 2024-01-25 | Stop reason: HOSPADM

## 2024-01-21 RX ORDER — IPRATROPIUM BROMIDE AND ALBUTEROL SULFATE 2.5; .5 MG/3ML; MG/3ML
3 SOLUTION RESPIRATORY (INHALATION) 3 TIMES DAILY
Status: DISCONTINUED | OUTPATIENT
Start: 2024-01-21 | End: 2024-01-22

## 2024-01-21 RX ORDER — IBUPROFEN 200 MG
16 TABLET ORAL
Status: DISCONTINUED | OUTPATIENT
Start: 2024-01-21 | End: 2024-01-25 | Stop reason: HOSPADM

## 2024-01-21 RX ORDER — ONDANSETRON HYDROCHLORIDE 2 MG/ML
4 INJECTION, SOLUTION INTRAVENOUS EVERY 6 HOURS PRN
Status: DISCONTINUED | OUTPATIENT
Start: 2024-01-21 | End: 2024-01-22

## 2024-01-21 RX ORDER — MORPHINE SULFATE 4 MG/ML
4 INJECTION, SOLUTION INTRAMUSCULAR; INTRAVENOUS
Status: COMPLETED | OUTPATIENT
Start: 2024-01-21 | End: 2024-01-21

## 2024-01-21 RX ORDER — FAMOTIDINE 20 MG/1
20 TABLET, FILM COATED ORAL 2 TIMES DAILY
Status: DISCONTINUED | OUTPATIENT
Start: 2024-01-21 | End: 2024-01-25 | Stop reason: HOSPADM

## 2024-01-21 RX ORDER — MORPHINE SULFATE 2 MG/ML
4 INJECTION, SOLUTION INTRAMUSCULAR; INTRAVENOUS
Status: COMPLETED | OUTPATIENT
Start: 2024-01-21 | End: 2024-01-21

## 2024-01-21 RX ADMIN — NICOTINE 1 PATCH: 21 PATCH, EXTENDED RELEASE TRANSDERMAL at 02:01

## 2024-01-21 RX ADMIN — LORAZEPAM 1 MG: 2 INJECTION INTRAMUSCULAR; INTRAVENOUS at 07:01

## 2024-01-21 RX ADMIN — IPRATROPIUM BROMIDE AND ALBUTEROL SULFATE 3 ML: .5; 3 SOLUTION RESPIRATORY (INHALATION) at 08:01

## 2024-01-21 RX ADMIN — HYDROMORPHONE HYDROCHLORIDE 0.5 MG: 2 INJECTION, SOLUTION INTRAMUSCULAR; INTRAVENOUS; SUBCUTANEOUS at 10:01

## 2024-01-21 RX ADMIN — MORPHINE SULFATE 4 MG: 4 INJECTION, SOLUTION INTRAMUSCULAR; INTRAVENOUS at 12:01

## 2024-01-21 RX ADMIN — HYDROCODONE BITARTRATE AND ACETAMINOPHEN 1 TABLET: 5; 325 TABLET ORAL at 05:01

## 2024-01-21 RX ADMIN — ONDANSETRON 4 MG: 2 INJECTION INTRAMUSCULAR; INTRAVENOUS at 09:01

## 2024-01-21 RX ADMIN — LISINOPRIL 2.5 MG: 2.5 TABLET ORAL at 02:01

## 2024-01-21 RX ADMIN — MUPIROCIN 1 G: 20 OINTMENT TOPICAL at 09:01

## 2024-01-21 RX ADMIN — MORPHINE SULFATE 4 MG: 2 INJECTION, SOLUTION INTRAMUSCULAR; INTRAVENOUS at 06:01

## 2024-01-21 RX ADMIN — ONDANSETRON 4 MG: 2 INJECTION INTRAMUSCULAR; INTRAVENOUS at 06:01

## 2024-01-21 RX ADMIN — ATORVASTATIN CALCIUM 20 MG: 20 TABLET, FILM COATED ORAL at 02:01

## 2024-01-21 RX ADMIN — IPRATROPIUM BROMIDE AND ALBUTEROL SULFATE 3 ML: .5; 3 SOLUTION RESPIRATORY (INHALATION) at 01:01

## 2024-01-21 RX ADMIN — SODIUM CHLORIDE, POTASSIUM CHLORIDE, SODIUM LACTATE AND CALCIUM CHLORIDE: 600; 310; 30; 20 INJECTION, SOLUTION INTRAVENOUS at 02:01

## 2024-01-21 RX ADMIN — FAMOTIDINE 20 MG: 20 TABLET ORAL at 09:01

## 2024-01-21 RX ADMIN — ENOXAPARIN SODIUM 40 MG: 100 INJECTION SUBCUTANEOUS at 05:01

## 2024-01-21 NOTE — ED NOTES
Attempted to call report.  Nurses stated they had a code in progress and would call back.  Gave phone number to call.

## 2024-01-21 NOTE — ED NOTES
Transportation here now to take pt to [facility].  [place] Cath 16F placed prior to transport.  Emptied 1000 ml clear yellow urine and clamped Cath to prevent bladder spasm with instruction to EMS to removed clamp prior to arrival to [place]. Verbalized understanding.

## 2024-01-21 NOTE — ED PROVIDER NOTES
Encounter Date: 1/21/2024       History     Chief Complaint   Patient presents with    Fall     Ground level fall left hip pain 1 :30 hr ago , states she was headed to the bathroom +ETOH ( 3 beers)      Patient is a 63-year-old female presenting secondary to ground level fall at home.  Patient presents complain of left hip pain.  Patient states she fell approximately 1 hour prior to arrival.  Patient arrives by ambulance.  Medics state that patient was lying on the ground when they arrived.  Patient denies any other complaints.  She denies any head trauma or headache, no neck pain.  Patient denies any chest pain or shortness of breath.  Patient denies use of blood thinners.  Medics state that there were several empty beer cans that patient's residence.        Review of patient's allergies indicates:  No Known Allergies  Past Medical History:   Diagnosis Date    CHF (congestive heart failure)     COPD (chronic obstructive pulmonary disease)     GERD (gastroesophageal reflux disease)     Hyperlipidemia     Hypertension     Hypokalemia     Insomnia     Nicotine dependence     Osteoporosis      Past Surgical History:   Procedure Laterality Date    ADENOIDECTOMY      COLONOSCOPY N/A 8/8/2023    Procedure: COLONOSCOPY;  Surgeon: Mikhail Marcano MD;  Location: [facility];  Service: Endoscopy;  Laterality: N/A;  Scant ascending colon diverticular disease   sigmoid diverticular     COLONOSCOPY, WITH POLYPECTOMY USING HOT BIOPSY FORCEPS N/A 8/8/2023    Procedure: COLONOSCOPY, WITH POLYPECTOMY USING HOT BIOPSY FORCEPS;  Surgeon: Mikhail Marcano MD;  Location: [facility];  Service: Endoscopy;  Laterality: N/A;  B. Polyp at 10-15cm; no heat     COLONOSCOPY, WITH POLYPECTOMY USING SNARE N/A 8/8/2023    Procedure: COLONOSCOPY, WITH POLYPECTOMY USING SNARE;  Surgeon: Mikhail Marcano MD;  Location: [facility];  Service: Endoscopy;  Laterality: N/A;  A. appendicle polyp       EGD, WITH CLOSED BIOPSY  07/15/2023     Procedure: EGD, WITH CLOSED BIOPSY;  Surgeon: Tyree Hodges MD;  Location: [facility];  Service: Gastroenterology;;    EGD, WITH HEMORRHAGE CONTROL  07/15/2023    Procedure: EGD,WITH HEMORRHAGE CONTROL;  Surgeon: Tyree Hodges MD;  Location: [facility];  Service: Gastroenterology;;  gold probe 7FR    ESOPHAGOGASTRODUODENOSCOPY N/A 07/15/2023    Procedure: EGD (ESOPHAGOGASTRODUODENOSCOPY);  Surgeon: Tyree Hodges MD;  Location: [facility];  Service: Gastroenterology;  Laterality: N/A;    HEMORRHOID SURGERY      HYSTERECTOMY      TONSILLECTOMY       Family History   Problem Relation Age of Onset    Hypertension Mother     Diabetes Mother     Hypertension Father      Social History     Tobacco Use    Smoking status: Every Day     Current packs/day: 0.50     Types: Cigarettes    Smokeless tobacco: Never   Substance Use Topics    Alcohol use: Yes     Alcohol/week: 2.0 standard drinks of alcohol     Types: 2 Cans of beer per week    Drug use: Never     Review of Systems   Constitutional:  Negative for chills and fever.   Respiratory: Negative.     Cardiovascular: Negative.    Gastrointestinal: Negative.    Genitourinary:  Negative for flank pain.   Musculoskeletal:  Positive for arthralgias and myalgias. Negative for neck pain and neck stiffness.   Skin: Negative.    Neurological: Negative.    Psychiatric/Behavioral: Negative.         Physical Exam     Initial Vitals [01/21/24 0635]   BP Pulse Resp Temp SpO2   (!) 154/92 (!) 115 20 97.8 °F (36.6 °C) 100 %      MAP       --         Physical Exam    Nursing note and vitals reviewed.  Constitutional: She appears well-developed and well-nourished.   HENT:   Head: Normocephalic and atraumatic.   Neck: Neck supple.   Normal range of motion.  Cardiovascular:  Normal rate, regular rhythm and normal heart sounds.           Patient has a palpable left dorsalis pedis pulse.   Pulmonary/Chest: No respiratory distress. She has wheezes. She has no rhonchi.   Patient has very mild wheezing  bilaterally   Abdominal: Abdomen is soft. There is no abdominal tenderness. There is no guarding.   Musculoskeletal:      Cervical back: Normal range of motion and neck supple.      Comments: Patient has tenderness to palpation to the lateral aspect of the left hip.  Patient has pain on passive range of motion of the left lower extremity at the left hip.  Patient can move all digits of the left foot.  Sensory is intact to the left foot     Neurological: She is alert and oriented to person, place, and time. She has normal strength.   Psychiatric: She has a normal mood and affect. Her behavior is normal. Thought content normal.         ED Course   Critical Care    Date/Time: 1/21/2024 6:52 AM    Performed by: Sunny Romo MD  Authorized by: Sunny Romo MD  Direct patient critical care time: 10 minutes  Additional history critical care time: 10 minutes  Ordering / reviewing critical care time: 10 minutes  Documentation critical care time: 10 minutes  Consulting other physicians critical care time: 5 minutes  Consult with family critical care time: 5 minutes  Total critical care time (exclusive of procedural time) : 50 minutes  Critical care time was exclusive of separately billable procedures and treating other patients and teaching time.  Critical care was necessary to treat or prevent imminent or life-threatening deterioration of the following conditions: trauma.  Critical care was time spent personally by me on the following activities: evaluation of patient's response to treatment, examination of patient, obtaining history from patient or surrogate, ordering and performing treatments and interventions, ordering and review of laboratory studies, ordering and review of radiographic studies, pulse oximetry, re-evaluation of patient's condition and review of old charts.  Subsequent provider of critical care: I assumed direction of critical care for this patient from another provider of my  specialty.        Labs Reviewed   COMPREHENSIVE METABOLIC PANEL - Abnormal; Notable for the following components:       Result Value    Sodium Level 135 (*)     Chloride 95 (*)     Blood Urea Nitrogen 3.0 (*)     Protein Total 7.8 (*)     All other components within normal limits   ALCOHOL,MEDICAL (ETHANOL) - Abnormal; Notable for the following components:    Ethanol Level 329.0 (*)     All other components within normal limits   APTT - Normal   PROTIME-INR - Normal   CBC W/ AUTO DIFFERENTIAL    Narrative:     The following orders were created for panel order CBC auto differential.  Procedure                               Abnormality         Status                     ---------                               -----------         ------                     CBC with Differential[[id number]]                           Final result                 Please view results for these tests on the individual orders.   CBC WITH DIFFERENTIAL        ECG Results              EKG 12-lead (Preliminary result)  Result time 01/21/24 06:57:38      Wet Read by Sunny Romo MD (01/21/24 06:57:38, [facility], [facility])    Normal sinus rhythm, heart rate 89, normal intervals, normal axis, there was a strong baseline artifact, but QRS appears normal, ST segments appear normal, T-waves appear normal, there was normal QT.                                  Imaging Results              CT Hip Without Contrast Left (Final result)  Result time 01/21/24 08:26:58      Final result by Reji Huntley MD (01/21/24 08:26:58)                   Impression:      Left femoral subcapital fracture.      Electronically signed by: Reji Huntley  Date:    01/21/2024  Time:    08:26               Narrative:    EXAMINATION:  CT HIP WITHOUT CONTRAST LEFT    CLINICAL HISTORY:  Hip trauma, fracture suspected, xray done;    TECHNIQUE:  Multidetector axial were performed of the left hip without administration of contrast images  were reconstructed.    Dose length product was 211 mGycm. Automated radiation control was utilized to minimize radiation dose.    COMPARISON:  Left hip radiographs same date.    FINDINGS:  There is left femoral subcapital fracture with fracture line through the junction of the head and the neck of the femur.  The fracture is with impaction.  There is some superior retraction of femoral neck relative to the head.  Femoral head is situated within acetabulum.  No fracture of the acetabulum identified.  No significant adjacent soft tissue inflammations or hematoma.  No apparent joint effusion.  There are no fractures of the pubic rami and no diastasis of the symphysis pubis.  The sacroiliac joints and the left iliac bone are intact.    Urinary bladder is distended.  Partially visualized pelvis is without apparent free fluid.                        Wet Read by Sunny Romo MD (01/21/24 08:21:37, [facility], Emergency Medicine)    Femoral neck fx                                     X-Ray Hip 2 or 3 views Left (with Pelvis when performed) (Final result)  Result time 01/21/24 08:31:52   Notes recorded by JOEY Blanchard on 1/21/2024 at 9:13 AM CST  ER MD aware transferring for ortho     Final result by Ahsan Oneal MD (01/21/24 08:31:52)                   Impression:      Minimally displaced subcapital left femoral neck fracture.      Electronically signed by: Ahsan Oneal MD  Date:    01/21/2024  Time:    08:31               Narrative:    EXAMINATION:  Three radiographic views of the LEFT HIP.    CLINICAL HISTORY:  Pain in left hip    TECHNIQUE:  Three radiographic views of the LEFT HIP.    COMPARISON:  CT of the left hip 01/21/2024.    FINDINGS:  AP view of the pelvis with AP and lateral views of the left hip demonstrate a minimally displaced subcapital left femoral neck fracture.  There is no bony mass lesion.  Femoroacetabular alignment is maintained.                        Wet Read by Demetrius  Sunny GARRETT MD (01/21/24 08:24:47, [facility], Emergency [facility])    L femoral neck fx                                     Medications   morphine injection 4 mg (4 mg Intravenous Given 1/21/24 0655)   ondansetron injection 4 mg (4 mg Intravenous Given 1/21/24 0655)   LORazepam (ATIVAN) injection 1 mg (1 mg Intravenous Given 1/21/24 0739)     Medical Decision Making  Amount and/or Complexity of Data Reviewed  Labs: ordered. Decision-making details documented in ED Course.  Radiology: ordered and independent interpretation performed. Decision-making details documented in ED Course.  Discussion of management or test interpretation with external provider(s): Severe pain L hip after fall. Hip appears to be fractured on x-ray. Work up in progress. Will get CT to confirm nature of fx.    Accepted in transfer by Dr. Rawls in [facility].    Risk  Prescription drug management.               ED Course as of 01/21/24 0943   Sun Jan 21, 2024   0643 Dr West will assume care of patient at 7:00 a.m. [KG]   0708 CBC auto differential  Normal CBC [TM]   0722 Ethanol(!!)  329 [TM]   0723 Comprehensive metabolic panel(!)  Unremarkable [TM]   0737 Protime-INR  Normal PT/PTT [TM]   0823 CT Hip Without Contrast Left [TM]      ED Course User Index  [KG] Williams Díaz MD  [TM] Sunny oRmo MD                           Clinical Impression:  Final diagnoses:  [M25.552] Left hip pain  [Z01.818] Preop testing  [F10.920] Alcoholic intoxication without complication  [S72.002A] Closed fracture of neck of left femur, initial encounter (Primary)          ED Disposition Condition    Transfer to Another Facility Stable                Sunny Romo MD  01/21/24 0943

## 2024-01-21 NOTE — ED NOTES
Pt stated that she was at home and got up to use the bathroom and fell at home landing on hard wood floors to her left hip.  Pain 10/10 to left hip.  Left hip cap refill <3 sec, pedal pulse +3.

## 2024-01-21 NOTE — ED PROVIDER NOTES
Encounter Date: 1/21/2024       History     Chief Complaint   Patient presents with    Hip Pain     Sent from [facility] for orthopedic services due to L hip fracture secondary to a fall this morning.     The patient presents as an ED to ED transfer.  She was seen in [place] after she fell and sustained a left hip fracture.  The patient states she is having quite a bit of pain in the left hip.  I reviewed her records and the last pain medication she received was about 5 hours ago.        Review of patient's allergies indicates:  No Known Allergies  Past Medical History:   Diagnosis Date    CHF (congestive heart failure)     COPD (chronic obstructive pulmonary disease)     GERD (gastroesophageal reflux disease)     Hyperlipidemia     Hypertension     Hypokalemia     Insomnia     Nicotine dependence     Osteoporosis      Past Surgical History:   Procedure Laterality Date    ADENOIDECTOMY      COLONOSCOPY N/A 8/8/2023    Procedure: COLONOSCOPY;  Surgeon: Mikhail Marcano MD;  Location: [facility];  Service: Endoscopy;  Laterality: N/A;  Scant ascending colon diverticular disease   sigmoid diverticular     COLONOSCOPY, WITH POLYPECTOMY USING HOT BIOPSY FORCEPS N/A 8/8/2023    Procedure: COLONOSCOPY, WITH POLYPECTOMY USING HOT BIOPSY FORCEPS;  Surgeon: Mikhail Marcano MD;  Location: [facility];  Service: Endoscopy;  Laterality: N/A;  B. Polyp at 10-15cm; no heat     COLONOSCOPY, WITH POLYPECTOMY USING SNARE N/A 8/8/2023    Procedure: COLONOSCOPY, WITH POLYPECTOMY USING SNARE;  Surgeon: Mikhail Marcano MD;  Location: [facility];  Service: Endoscopy;  Laterality: N/A;  A. appendicle polyp       EGD, WITH CLOSED BIOPSY  07/15/2023    Procedure: EGD, WITH CLOSED BIOPSY;  Surgeon: Tyree Hodges MD;  Location: [facility];  Service: Gastroenterology;;    EGD, WITH HEMORRHAGE CONTROL  07/15/2023    Procedure: EGD,WITH HEMORRHAGE CONTROL;  Surgeon: Tyree Hodges MD;  Location: [facility];  Service:  Gastroenterology;;  gold probe 7FR    ESOPHAGOGASTRODUODENOSCOPY N/A 07/15/2023    Procedure: EGD (ESOPHAGOGASTRODUODENOSCOPY);  Surgeon: Tyree Hodges MD;  Location: [facility];  Service: Gastroenterology;  Laterality: N/A;    HEMORRHOID SURGERY      HYSTERECTOMY      TONSILLECTOMY       Family History   Problem Relation Age of Onset    Hypertension Mother     Diabetes Mother     Hypertension Father      Social History     Tobacco Use    Smoking status: Every Day     Current packs/day: 0.50     Types: Cigarettes    Smokeless tobacco: Never   Substance Use Topics    Alcohol use: Yes     Alcohol/week: 2.0 standard drinks of alcohol     Types: 2 Cans of beer per week    Drug use: Never     Review of Systems   Unable to perform ROS: Acuity of condition       Physical Exam     Initial Vitals   BP Pulse Resp Temp SpO2   01/21/24 1136 01/21/24 1136 01/21/24 1136 01/21/24 1200 01/21/24 1136   (!) 111/56 102 18 98 °F (36.7 °C) 100 %      MAP       --                Physical Exam    Constitutional:   Vital signs reviewed.  Nursing note reviewed.    General:  The patient is awake and alert, appropriate and interactive.    Eyes: Conjunctiva are clear.  Corneas appear normal.  EOMI.  ENT: Face, head, external nose, and ears are normal-appearing.  The oropharynx appears normal.  The uvula is midline.  The posterior pharyngeal elements are symmetric.  Voice is normal.  Neck: The neck is nontender and supple with normal range of motion.  Back: The back appears normal with full range of motion.  Respiratory: The respiratory effort is normal.  The lungs are clear to auscultation.  Cardiovascular: Heart sounds are normal.  No murmur or rub.  The rate is normal.  The rhythm is normal.  Peripheral pulses are normal and equal bilaterally.  No edema is present.  Capillary refill is less than 3 seconds.  GI: The abdomen is soft, nondistended, without mass.  There is no tenderness to palpation.  No rebound or guarding.  Bowel sounds are  normal.  The liver and spleen are nonpalpable.  Musculoskeletal: Range of motion of all joints appears normal without pain or tenderness except for the left hip.  I did not perform range of motion of the left hip due to the acute fracture.  Skin: There is no rash.  Neurologic: No focal deficits are noted.           ED Course   Procedures  Labs Reviewed   DRUG SCREEN, URINE (BEAKER)   URINALYSIS, REFLEX TO URINE CULTURE          Imaging Results    None          Medications   morphine injection 4 mg (has no administration in time range)     Medical Decision Making  I spoke with Dr. Caceres at 1200 and he accepted for admission.  He requested a urinalysis and drug screen.    Risk  Prescription drug management.                                      Clinical Impression:  Final diagnoses:  [S72.002A] Closed fracture of left hip, initial encounter (Primary)  [F10.920] Alcoholic intoxication without complication          ED Disposition Condition    Admit Stable                Tyree Rawls MD  01/21/24 9935

## 2024-01-21 NOTE — ED NOTES
63 YEAR OLD FEMALE PRESENTED TO THE ED PER STRETCHER PER EMS COMPLAINING OF LEFT HIP PAIN.  PT STATES SHE FELL PRIOR TO ARRIVAL.  DENIES LOC OR HEAD TRAUMA

## 2024-01-21 NOTE — H&P
[facility] - Medical Surgical Unit    History & Physical      Patient Name: Antonina Jaimes  MRN: [id number]  Admission Date: 1/21/2024  Attending Physician: Williams Caceres MD   Primary Care Provider: Donis Mcneil MD         Patient information was obtained from patient and ER records.     Subjective:     Principal Problem:Closed fracture of left hip    Chief Complaint:   Chief Complaint   Patient presents with    Hip Pain     Sent from [facility] for orthopedic services due to L hip fracture secondary to a fall this morning.        HPI:   Ms. Jaimes is a 63-year-old female who presented initially to the ED and [place], [place] following a ground level fall at home and subsequent left hip pain.  The injury occurred about 1 hour prior to her presentation.  Evaluation in the ED there revealed a subcapital left femoral neck fracture.  She was transferred here for orthopedic evaluation and management.  She was also noted to be intoxicated and had an ETOH level greater than 300.  In addition to this reportedly also received IV Ativan and morphine in the ED.  On seeing her following her arrival to the [facility] unit, she is comfortable in bed, no other complaints, appears intoxicated.      Past Medical History:   Diagnosis Date    CHF (congestive heart failure)     COPD (chronic obstructive pulmonary disease)     GERD (gastroesophageal reflux disease)     Hyperlipidemia     Hypertension     Hypokalemia     Insomnia     Nicotine dependence     Osteoporosis        Past Surgical History:   Procedure Laterality Date    ADENOIDECTOMY      COLONOSCOPY N/A 8/8/2023    Procedure: COLONOSCOPY;  Surgeon: Mikhail Marcano MD;  Location: [facility];  Service: Endoscopy;  Laterality: N/A;  Scant ascending colon diverticular disease   sigmoid diverticular     COLONOSCOPY, WITH POLYPECTOMY USING HOT BIOPSY FORCEPS N/A 8/8/2023    Procedure: COLONOSCOPY, WITH POLYPECTOMY USING HOT BIOPSY FORCEPS;   Surgeon: Mikhail Marcano MD;  Location: [facility];  Service: Endoscopy;  Laterality: N/A;  B. Polyp at 10-15cm; no heat     COLONOSCOPY, WITH POLYPECTOMY USING SNARE N/A 8/8/2023    Procedure: COLONOSCOPY, WITH POLYPECTOMY USING SNARE;  Surgeon: Mikhail Marcano MD;  Location: [facility];  Service: Endoscopy;  Laterality: N/A;  A. appendicle polyp       EGD, WITH CLOSED BIOPSY  07/15/2023    Procedure: EGD, WITH CLOSED BIOPSY;  Surgeon: Tyree Hodges MD;  Location: [facility];  Service: Gastroenterology;;    EGD, WITH HEMORRHAGE CONTROL  07/15/2023    Procedure: EGD,WITH HEMORRHAGE CONTROL;  Surgeon: Tyree Hodges MD;  Location: [facility] OR;  Service: Gastroenterology;;  gold probe 7FR    ESOPHAGOGASTRODUODENOSCOPY N/A 07/15/2023    Procedure: EGD (ESOPHAGOGASTRODUODENOSCOPY);  Surgeon: Tyree Hodges MD;  Location: [facility];  Service: Gastroenterology;  Laterality: N/A;    HEMORRHOID SURGERY      HYSTERECTOMY      TONSILLECTOMY         Review of patient's allergies indicates:  No Known Allergies    Current Facility-Administered Medications on File Prior to Encounter   Medication    [COMPLETED] LORazepam (ATIVAN) injection 1 mg    [COMPLETED] morphine injection 4 mg    [COMPLETED] ondansetron injection 4 mg     Current Outpatient Medications on File Prior to Encounter   Medication Sig    albuterol sulfate 2.5 mg/0.5 mL Nebu Take 2.5 mg by nebulization every 4 (four) hours as needed (Cough or wheezing). Rescue    albuterol-ipratropium (DUO-NEB) 2.5 mg-0.5 mg/3 mL nebulizer solution Take 3 mLs by nebulization every 6 (six) hours as needed for Shortness of Breath.    aspirin (ECOTRIN) 81 MG EC tablet Take 81 mg by mouth every morning. Stopped 8/2/23    digoxin (LANOXIN) 125 mcg tablet TAKE 1 TABLET BY MOUTH EVERY DAY    enalapril (VASOTEC) 5 MG tablet TAKE 1/2 TABLET BY MOUTH DAILY    fluticasone-umeclidin-vilanter (TRELEGY ELLIPTA) 100-62.5-25 mcg DsDv Inhale 1 puff into the lungs once daily.    potassium  chloride SA (K-DUR,KLOR-CON M) 10 MEQ tablet TAKE 1 TABLET BY MOUTH ONCE DAILY    simvastatin (ZOCOR) 20 MG tablet TAKE 1 TABLET BY MOUTH EVERYDAY AT BEDTIME    sodium,potassium,mag sulfates (SUPREP BOWEL PREP KIT) 17.5-3.13-1.6 gram SolR SMARTSI Bottle(s) By Mouth Twice Daily    traZODone (DESYREL) 50 MG tablet Take 1 tablet (50 mg total) by mouth every evening.     Family History       Problem Relation (Age of Onset)    Diabetes Mother    Hypertension Mother, Father          Tobacco Use    Smoking status: Every Day     Current packs/day: 0.50     Types: Cigarettes    Smokeless tobacco: Never   Substance and Sexual Activity    Alcohol use: Yes     Alcohol/week: 2.0 standard drinks of alcohol     Types: 2 Cans of beer per week    Drug use: Never    Sexual activity: Yes     Review of Systems   Constitutional: Negative.    HENT: Negative.     Eyes: Negative.    Respiratory: Negative.     Cardiovascular: Negative.    Gastrointestinal: Negative.    Genitourinary: Negative.    Skin: Negative.    Neurological: Negative.    Psychiatric/Behavioral: Negative.       Objective:     Vital Signs (Most Recent):  Temp: 97.5 °F (36.4 °C) (24 1257)  Pulse: 92 (24 1257)  Resp: 18 (24 1215)  BP: 113/70 (24 1257)  SpO2: 97 % (24 1257) Vital Signs (24h Range):  Temp:  [97.5 °F (36.4 °C)-98 °F (36.7 °C)] 97.5 °F (36.4 °C)  Pulse:  [] 92  Resp:  [14-21] 18  SpO2:  [93 %-100 %] 97 %  BP: (105-154)/() 113/70     Weight: 52.2 kg (115 lb)  Body mass index is 20.37 kg/m².    Physical exam  Constitution-well nourished, normally developed female in NAD  HENT-normocephalic, atraumatic; external ears appear normal; moist mucous membranes  Neck-supple  Respiratory-normal respirations; CTA with good air movement  Heart-RRR; normal S1 and S2; no murmurs, gallops  Abdomen-soft, nontender, nondistended; active bowel sounds  Genitourinary-deferred  Musculoskeletal-no joint abnormalities, normal ROM throughout  "with exception of left hip  Skin-warm, dry; no rashes  Neurologic-alert and oriented x3; nonfocal exam    Scheduled Meds:   albuterol-ipratropium  3 mL Nebulization TID    atorvastatin  20 mg Oral Daily    [START ON 1/22/2024] digoxin  0.125 mg Oral Daily    lisinopriL  2.5 mg Oral Daily    nicotine  1 patch Transdermal Daily     Continuous Infusions:   lactated ringers       PRN Meds:.dextrose 10%, dextrose 10%, glucagon (human recombinant), glucose, glucose, HYDROcodone-acetaminophen, morphine, naloxone, sodium chloride 0.9%      Significant Labs: All pertinent labs within the past 24 hours have been reviewed.  BMP:   Recent Labs   Lab 01/21/24  0654   *   K 3.7   CO2 28   BUN 3.0*   CREATININE 0.61   CALCIUM 9.0     CMP:   Recent Labs   Lab 01/21/24  0654   *   K 3.7   CO2 28   BUN 3.0*   CREATININE 0.61   CALCIUM 9.0   ALBUMIN 4.4   BILITOT 0.3   ALKPHOS 78   AST 27   ALT 21     Magnesium: No results for input(s): "MG" in the last 48 hours.  Urine Studies:   Recent Labs   Lab 01/21/24  1243   APPEARANCEUA Clear   PROTEINUA Negative   BILIRUBINUA Negative   UROBILINOGEN 0.2   LEUKOCYTESUR Negative   RBCUA 0-2   WBCUA 0-2   BACTERIA None Seen       Significant Imaging: I have reviewed all pertinent imaging results/findings within the past 24 hours.  XR pelvis, left hip  Impression:  Minimally displaced subcapital left femoral neck fracture.    Assessment/Plan:   Closed, left femoral neck fracture  Orthopedics consulted    ETOH intoxication  Patient denies drinking daily, has no history of withdrawal symptoms  Monitor for ETOH withdrawal  IV hydration    COPD exacerbation/tobacco use disorder  Bronchodilator therapy  Nicotine patch per patient request    Hypertension  Continue routine medication    Patient noted to be on digoxin therapy  We will continue medication while attempting to find out why she is on this    VTE prophylaxis-Lovenox  GI prophylaxis-famotidine  Active Diagnoses:    Diagnosis Date " Noted POA    PRINCIPAL PROBLEM:  Closed fracture of left hip [S72.002A] 01/21/2024 Yes      Problems Resolved During this Admission:     VTE Risk Mitigation (From admission, onward)           Ordered     IP VTE LOW RISK PATIENT  Once         01/21/24 1256     Place sequential compression device  Until discontinued         01/21/24 1256                Social determinants of health limiting diagnosis/treatment   none        Williams Caceres MD  Department of Hospital Medicine   [facility] - Medical Surgical Unit

## 2024-01-22 ENCOUNTER — ANESTHESIA EVENT (OUTPATIENT)
Dept: SURGERY | Facility: HOSPITAL | Age: 64
DRG: 522 | End: 2024-01-22
Payer: MEDICAID

## 2024-01-22 ENCOUNTER — ANESTHESIA (OUTPATIENT)
Dept: SURGERY | Facility: HOSPITAL | Age: 64
DRG: 522 | End: 2024-01-22
Payer: MEDICAID

## 2024-01-22 LAB
ANION GAP SERPL CALC-SCNC: 9 MEQ/L
BASOPHILS # BLD AUTO: 0.02 X10(3)/MCL
BASOPHILS NFR BLD AUTO: 0.2 %
BUN SERPL-MCNC: 6 MG/DL (ref 9.8–20.1)
CALCIUM SERPL-MCNC: 8.9 MG/DL (ref 8.4–10.2)
CHLORIDE SERPL-SCNC: 96 MMOL/L (ref 98–107)
CO2 SERPL-SCNC: 25 MMOL/L (ref 23–31)
CREAT SERPL-MCNC: 0.63 MG/DL (ref 0.55–1.02)
CREAT/UREA NIT SERPL: 10
EOSINOPHIL # BLD AUTO: 0 X10(3)/MCL (ref 0–0.9)
EOSINOPHIL NFR BLD AUTO: 0 %
ERYTHROCYTE [DISTWIDTH] IN BLOOD BY AUTOMATED COUNT: 12.3 % (ref 11.5–17)
GFR SERPLBLD CREATININE-BSD FMLA CKD-EPI: >60 MLS/MIN/1.73/M2
GLUCOSE SERPL-MCNC: 114 MG/DL (ref 82–115)
HCT VFR BLD AUTO: 36.8 % (ref 37–47)
HGB BLD-MCNC: 12.2 G/DL (ref 12–16)
IMM GRANULOCYTES # BLD AUTO: 0.02 X10(3)/MCL (ref 0–0.04)
IMM GRANULOCYTES NFR BLD AUTO: 0.2 %
LYMPHOCYTES # BLD AUTO: 0.6 X10(3)/MCL (ref 0.6–4.6)
LYMPHOCYTES NFR BLD AUTO: 7.3 %
MAGNESIUM SERPL-MCNC: 1.8 MG/DL (ref 1.6–2.6)
MCH RBC QN AUTO: 29.8 PG (ref 27–31)
MCHC RBC AUTO-ENTMCNC: 33.2 G/DL (ref 33–36)
MCV RBC AUTO: 90 FL (ref 80–94)
MONOCYTES # BLD AUTO: 0.49 X10(3)/MCL (ref 0.1–1.3)
MONOCYTES NFR BLD AUTO: 6 %
NEUTROPHILS # BLD AUTO: 7.09 X10(3)/MCL (ref 2.1–9.2)
NEUTROPHILS NFR BLD AUTO: 86.3 %
PLATELET # BLD AUTO: 228 X10(3)/MCL (ref 130–400)
PMV BLD AUTO: 10.1 FL (ref 7.4–10.4)
POTASSIUM SERPL-SCNC: 4 MMOL/L (ref 3.5–5.1)
RBC # BLD AUTO: 4.09 X10(6)/MCL (ref 4.2–5.4)
SODIUM SERPL-SCNC: 130 MMOL/L (ref 136–145)
WBC # SPEC AUTO: 8.22 X10(3)/MCL (ref 4.5–11.5)

## 2024-01-22 PROCEDURE — 25000003 PHARM REV CODE 250: Performed by: NURSE ANESTHETIST, CERTIFIED REGISTERED

## 2024-01-22 PROCEDURE — 63600175 PHARM REV CODE 636 W HCPCS: Performed by: PHYSICIAN ASSISTANT

## 2024-01-22 PROCEDURE — 94640 AIRWAY INHALATION TREATMENT: CPT

## 2024-01-22 PROCEDURE — 36000710: Performed by: SPECIALIST

## 2024-01-22 PROCEDURE — 25000003 PHARM REV CODE 250: Performed by: EMERGENCY MEDICINE

## 2024-01-22 PROCEDURE — C1713 ANCHOR/SCREW BN/BN,TIS/BN: HCPCS | Performed by: SPECIALIST

## 2024-01-22 PROCEDURE — 63600175 PHARM REV CODE 636 W HCPCS: Mod: JZ,JG | Performed by: SPECIALIST

## 2024-01-22 PROCEDURE — 85025 COMPLETE CBC W/AUTO DIFF WBC: CPT | Performed by: EMERGENCY MEDICINE

## 2024-01-22 PROCEDURE — 88305 TISSUE EXAM BY PATHOLOGIST: CPT | Performed by: SPECIALIST

## 2024-01-22 PROCEDURE — 63600175 PHARM REV CODE 636 W HCPCS: Performed by: NURSE ANESTHETIST, CERTIFIED REGISTERED

## 2024-01-22 PROCEDURE — 25000003 PHARM REV CODE 250: Performed by: PHYSICIAN ASSISTANT

## 2024-01-22 PROCEDURE — 25000242 PHARM REV CODE 250 ALT 637 W/ HCPCS: Performed by: EMERGENCY MEDICINE

## 2024-01-22 PROCEDURE — 94799 UNLISTED PULMONARY SVC/PX: CPT | Mod: XB

## 2024-01-22 PROCEDURE — 63600175 PHARM REV CODE 636 W HCPCS: Performed by: EMERGENCY MEDICINE

## 2024-01-22 PROCEDURE — 36000711: Performed by: SPECIALIST

## 2024-01-22 PROCEDURE — 97161 PT EVAL LOW COMPLEX 20 MIN: CPT

## 2024-01-22 PROCEDURE — 88311 DECALCIFY TISSUE: CPT

## 2024-01-22 PROCEDURE — 37000008 HC ANESTHESIA 1ST 15 MINUTES: Performed by: SPECIALIST

## 2024-01-22 PROCEDURE — 83735 ASSAY OF MAGNESIUM: CPT | Performed by: EMERGENCY MEDICINE

## 2024-01-22 PROCEDURE — D9220A PRA ANESTHESIA: Mod: ,,, | Performed by: NURSE ANESTHETIST, CERTIFIED REGISTERED

## 2024-01-22 PROCEDURE — 27201423 OPTIME MED/SURG SUP & DEVICES STERILE SUPPLY: Performed by: SPECIALIST

## 2024-01-22 PROCEDURE — 99900035 HC TECH TIME PER 15 MIN (STAT)

## 2024-01-22 PROCEDURE — 63600175 PHARM REV CODE 636 W HCPCS: Performed by: INTERNAL MEDICINE

## 2024-01-22 PROCEDURE — 80048 BASIC METABOLIC PNL TOTAL CA: CPT | Performed by: EMERGENCY MEDICINE

## 2024-01-22 PROCEDURE — 11000001 HC ACUTE MED/SURG PRIVATE ROOM

## 2024-01-22 PROCEDURE — C1776 JOINT DEVICE (IMPLANTABLE): HCPCS | Performed by: SPECIALIST

## 2024-01-22 PROCEDURE — 37000009 HC ANESTHESIA EA ADD 15 MINS: Performed by: SPECIALIST

## 2024-01-22 PROCEDURE — 94761 N-INVAS EAR/PLS OXIMETRY MLT: CPT

## 2024-01-22 PROCEDURE — S4991 NICOTINE PATCH NONLEGEND: HCPCS | Performed by: EMERGENCY MEDICINE

## 2024-01-22 PROCEDURE — 0SRB0JZ REPLACEMENT OF LEFT HIP JOINT WITH SYNTHETIC SUBSTITUTE, OPEN APPROACH: ICD-10-PCS | Performed by: SPECIALIST

## 2024-01-22 PROCEDURE — 71000033 HC RECOVERY, INTIAL HOUR: Performed by: SPECIALIST

## 2024-01-22 DEVICE — SUMMIT FEMORAL STEM 12/14 TAPER TAPER ED W/POROCOAT SIZE 4 STD 140MM
Type: IMPLANTABLE DEVICE | Site: HIP | Status: FUNCTIONAL
Brand: SUMMIT POROCOAT

## 2024-01-22 DEVICE — BEADED CABLE AND SLEEVE SET
Type: IMPLANTABLE DEVICE | Site: HIP | Status: FUNCTIONAL
Brand: DALL-MILES

## 2024-01-22 DEVICE — IMPLANTABLE DEVICE: Type: IMPLANTABLE DEVICE | Site: HIP | Status: FUNCTIONAL

## 2024-01-22 DEVICE — MODULAR CATHCART TAPERED SPACER 12/14 TAPER -3MM: Type: IMPLANTABLE DEVICE | Site: HIP | Status: FUNCTIONAL

## 2024-01-22 RX ORDER — NAPROXEN SODIUM 220 MG/1
325 TABLET, FILM COATED ORAL 2 TIMES DAILY
Status: DISCONTINUED | OUTPATIENT
Start: 2024-01-22 | End: 2024-01-25 | Stop reason: HOSPADM

## 2024-01-22 RX ORDER — BISACODYL 10 MG/1
10 SUPPOSITORY RECTAL DAILY PRN
Status: DISCONTINUED | OUTPATIENT
Start: 2024-01-22 | End: 2024-01-25 | Stop reason: HOSPADM

## 2024-01-22 RX ORDER — TRAMADOL HYDROCHLORIDE 50 MG/1
50 TABLET ORAL EVERY 6 HOURS PRN
Status: DISCONTINUED | OUTPATIENT
Start: 2024-01-22 | End: 2024-01-25 | Stop reason: HOSPADM

## 2024-01-22 RX ORDER — DEXAMETHASONE SODIUM PHOSPHATE 4 MG/ML
INJECTION, SOLUTION INTRA-ARTICULAR; INTRALESIONAL; INTRAMUSCULAR; INTRAVENOUS; SOFT TISSUE
Status: DISCONTINUED | OUTPATIENT
Start: 2024-01-22 | End: 2024-01-22

## 2024-01-22 RX ORDER — ONDANSETRON HYDROCHLORIDE 2 MG/ML
INJECTION, SOLUTION INTRAVENOUS
Status: DISCONTINUED | OUTPATIENT
Start: 2024-01-22 | End: 2024-01-22

## 2024-01-22 RX ORDER — PHENYLEPHRINE HYDROCHLORIDE 10 MG/ML
INJECTION INTRAVENOUS
Status: DISCONTINUED | OUTPATIENT
Start: 2024-01-22 | End: 2024-01-22

## 2024-01-22 RX ORDER — LIDOCAINE HYDROCHLORIDE 20 MG/ML
INJECTION, SOLUTION EPIDURAL; INFILTRATION; INTRACAUDAL; PERINEURAL
Status: DISCONTINUED | OUTPATIENT
Start: 2024-01-22 | End: 2024-01-22

## 2024-01-22 RX ORDER — PROPOFOL 10 MG/ML
VIAL (ML) INTRAVENOUS
Status: DISCONTINUED | OUTPATIENT
Start: 2024-01-22 | End: 2024-01-22

## 2024-01-22 RX ORDER — MORPHINE SULFATE 4 MG/ML
2 INJECTION, SOLUTION INTRAMUSCULAR; INTRAVENOUS
Status: DISCONTINUED | OUTPATIENT
Start: 2024-01-22 | End: 2024-01-25 | Stop reason: HOSPADM

## 2024-01-22 RX ORDER — HYDROMORPHONE HYDROCHLORIDE 2 MG/ML
0.2 INJECTION, SOLUTION INTRAMUSCULAR; INTRAVENOUS; SUBCUTANEOUS EVERY 5 MIN PRN
Status: DISCONTINUED | OUTPATIENT
Start: 2024-01-22 | End: 2024-01-22

## 2024-01-22 RX ORDER — SODIUM CHLORIDE 0.9 % (FLUSH) 0.9 %
10 SYRINGE (ML) INJECTION
Status: DISCONTINUED | OUTPATIENT
Start: 2024-01-22 | End: 2024-01-22

## 2024-01-22 RX ORDER — PROMETHAZINE HYDROCHLORIDE 12.5 MG/1
25 TABLET ORAL EVERY 6 HOURS PRN
Status: DISCONTINUED | OUTPATIENT
Start: 2024-01-22 | End: 2024-01-25 | Stop reason: HOSPADM

## 2024-01-22 RX ORDER — ONDANSETRON HYDROCHLORIDE 2 MG/ML
4 INJECTION, SOLUTION INTRAVENOUS EVERY 8 HOURS PRN
Status: DISCONTINUED | OUTPATIENT
Start: 2024-01-22 | End: 2024-01-25 | Stop reason: HOSPADM

## 2024-01-22 RX ORDER — BUPIVACAINE HYDROCHLORIDE 2.5 MG/ML
INJECTION, SOLUTION EPIDURAL; INFILTRATION; INTRACAUDAL
Status: DISCONTINUED | OUTPATIENT
Start: 2024-01-22 | End: 2024-01-22 | Stop reason: HOSPADM

## 2024-01-22 RX ORDER — TRANEXAMIC ACID 100 MG/ML
INJECTION, SOLUTION INTRAVENOUS
Status: DISCONTINUED | OUTPATIENT
Start: 2024-01-22 | End: 2024-01-22

## 2024-01-22 RX ORDER — HYDROMORPHONE HYDROCHLORIDE 2 MG/ML
INJECTION, SOLUTION INTRAMUSCULAR; INTRAVENOUS; SUBCUTANEOUS
Status: DISCONTINUED | OUTPATIENT
Start: 2024-01-22 | End: 2024-01-22

## 2024-01-22 RX ORDER — SODIUM CHLORIDE, SODIUM LACTATE, POTASSIUM CHLORIDE, CALCIUM CHLORIDE 600; 310; 30; 20 MG/100ML; MG/100ML; MG/100ML; MG/100ML
INJECTION, SOLUTION INTRAVENOUS CONTINUOUS
Status: CANCELLED | OUTPATIENT
Start: 2024-01-22

## 2024-01-22 RX ORDER — SODIUM CHLORIDE, SODIUM LACTATE, POTASSIUM CHLORIDE, CALCIUM CHLORIDE 600; 310; 30; 20 MG/100ML; MG/100ML; MG/100ML; MG/100ML
INJECTION, SOLUTION INTRAVENOUS CONTINUOUS
Status: DISCONTINUED | OUTPATIENT
Start: 2024-01-22 | End: 2024-01-25 | Stop reason: HOSPADM

## 2024-01-22 RX ORDER — OXYCODONE HYDROCHLORIDE 5 MG/1
10 TABLET ORAL EVERY 6 HOURS PRN
Status: DISCONTINUED | OUTPATIENT
Start: 2024-01-22 | End: 2024-01-25 | Stop reason: HOSPADM

## 2024-01-22 RX ORDER — IPRATROPIUM BROMIDE AND ALBUTEROL SULFATE 2.5; .5 MG/3ML; MG/3ML
3 SOLUTION RESPIRATORY (INHALATION)
Status: DISCONTINUED | OUTPATIENT
Start: 2024-01-22 | End: 2024-01-25 | Stop reason: HOSPADM

## 2024-01-22 RX ORDER — TALC
6 POWDER (GRAM) TOPICAL NIGHTLY PRN
Status: DISCONTINUED | OUTPATIENT
Start: 2024-01-22 | End: 2024-01-25 | Stop reason: HOSPADM

## 2024-01-22 RX ORDER — DOCUSATE SODIUM 100 MG/1
100 CAPSULE, LIQUID FILLED ORAL EVERY 12 HOURS
Status: DISCONTINUED | OUTPATIENT
Start: 2024-01-22 | End: 2024-01-25 | Stop reason: HOSPADM

## 2024-01-22 RX ORDER — HYDRALAZINE HYDROCHLORIDE 20 MG/ML
INJECTION INTRAMUSCULAR; INTRAVENOUS
Status: DISCONTINUED | OUTPATIENT
Start: 2024-01-22 | End: 2024-01-22

## 2024-01-22 RX ORDER — CEFAZOLIN SODIUM 2 G/50ML
2 SOLUTION INTRAVENOUS
Status: COMPLETED | OUTPATIENT
Start: 2024-01-22 | End: 2024-01-22

## 2024-01-22 RX ORDER — FENTANYL CITRATE 50 UG/ML
25 INJECTION, SOLUTION INTRAMUSCULAR; INTRAVENOUS EVERY 5 MIN PRN
Status: DISCONTINUED | OUTPATIENT
Start: 2024-01-22 | End: 2024-01-22

## 2024-01-22 RX ADMIN — HYDROMORPHONE HYDROCHLORIDE 0.5 MG: 2 INJECTION, SOLUTION INTRAMUSCULAR; INTRAVENOUS; SUBCUTANEOUS at 04:01

## 2024-01-22 RX ADMIN — DIGOXIN 0.12 MG: 125 TABLET ORAL at 12:01

## 2024-01-22 RX ADMIN — HYDROMORPHONE HYDROCHLORIDE 0.5 MG: 2 INJECTION INTRAMUSCULAR; INTRAVENOUS; SUBCUTANEOUS at 10:01

## 2024-01-22 RX ADMIN — DEXTROSE MONOHYDRATE 2 G: 5 INJECTION, SOLUTION INTRAVENOUS at 10:01

## 2024-01-22 RX ADMIN — DEXAMETHASONE SODIUM PHOSPHATE 4 MG: 4 INJECTION, SOLUTION INTRA-ARTICULAR; INTRALESIONAL; INTRAMUSCULAR; INTRAVENOUS; SOFT TISSUE at 10:01

## 2024-01-22 RX ADMIN — PROPOFOL 150 MG: 10 INJECTION, EMULSION INTRAVENOUS at 09:01

## 2024-01-22 RX ADMIN — IPRATROPIUM BROMIDE AND ALBUTEROL SULFATE 3 ML: .5; 3 SOLUTION RESPIRATORY (INHALATION) at 07:01

## 2024-01-22 RX ADMIN — IPRATROPIUM BROMIDE AND ALBUTEROL SULFATE 3 ML: .5; 3 SOLUTION RESPIRATORY (INHALATION) at 01:01

## 2024-01-22 RX ADMIN — SODIUM CHLORIDE, POTASSIUM CHLORIDE, SODIUM LACTATE AND CALCIUM CHLORIDE: 600; 310; 30; 20 INJECTION, SOLUTION INTRAVENOUS at 10:01

## 2024-01-22 RX ADMIN — CEFAZOLIN SODIUM 2 G: 2 SOLUTION INTRAVENOUS at 04:01

## 2024-01-22 RX ADMIN — LISINOPRIL 2.5 MG: 2.5 TABLET ORAL at 12:01

## 2024-01-22 RX ADMIN — SODIUM CHLORIDE, POTASSIUM CHLORIDE, SODIUM LACTATE AND CALCIUM CHLORIDE: 600; 310; 30; 20 INJECTION, SOLUTION INTRAVENOUS at 01:01

## 2024-01-22 RX ADMIN — ONDANSETRON 4 MG: 2 INJECTION INTRAMUSCULAR; INTRAVENOUS at 01:01

## 2024-01-22 RX ADMIN — TRANEXAMIC ACID 1000 MG: 100 INJECTION INTRAVENOUS at 11:01

## 2024-01-22 RX ADMIN — ONDANSETRON 4 MG: 2 INJECTION INTRAMUSCULAR; INTRAVENOUS at 07:01

## 2024-01-22 RX ADMIN — ONDANSETRON 4 MG: 2 INJECTION INTRAMUSCULAR; INTRAVENOUS at 10:01

## 2024-01-22 RX ADMIN — PHENYLEPHRINE HYDROCHLORIDE 100 MCG: 10 INJECTION INTRAVENOUS at 10:01

## 2024-01-22 RX ADMIN — HYDRALAZINE HYDROCHLORIDE 10 MG: 20 INJECTION INTRAMUSCULAR; INTRAVENOUS at 10:01

## 2024-01-22 RX ADMIN — ASPIRIN 81 MG CHEWABLE TABLET 324 MG: 81 TABLET CHEWABLE at 09:01

## 2024-01-22 RX ADMIN — SODIUM CHLORIDE, POTASSIUM CHLORIDE, SODIUM LACTATE AND CALCIUM CHLORIDE: 600; 310; 30; 20 INJECTION, SOLUTION INTRAVENOUS at 12:01

## 2024-01-22 RX ADMIN — PHENYLEPHRINE HYDROCHLORIDE 200 MCG: 10 INJECTION INTRAVENOUS at 10:01

## 2024-01-22 RX ADMIN — CEFAZOLIN SODIUM 2 G: 2 SOLUTION INTRAVENOUS at 09:01

## 2024-01-22 RX ADMIN — NICOTINE 1 PATCH: 21 PATCH, EXTENDED RELEASE TRANSDERMAL at 12:01

## 2024-01-22 RX ADMIN — MUPIROCIN 1 G: 20 OINTMENT TOPICAL at 09:01

## 2024-01-22 RX ADMIN — FAMOTIDINE 20 MG: 20 TABLET ORAL at 09:01

## 2024-01-22 RX ADMIN — ATORVASTATIN CALCIUM 20 MG: 20 TABLET, FILM COATED ORAL at 12:01

## 2024-01-22 RX ADMIN — OXYCODONE HYDROCHLORIDE 10 MG: 5 TABLET ORAL at 06:01

## 2024-01-22 RX ADMIN — HYDROMORPHONE HYDROCHLORIDE 1 MG: 2 INJECTION INTRAMUSCULAR; INTRAVENOUS; SUBCUTANEOUS at 10:01

## 2024-01-22 RX ADMIN — TRANEXAMIC ACID 1000 MG: 100 INJECTION INTRAVENOUS at 10:01

## 2024-01-22 RX ADMIN — OXYCODONE HYDROCHLORIDE 10 MG: 5 TABLET ORAL at 12:01

## 2024-01-22 RX ADMIN — DOCUSATE SODIUM 100 MG: 100 CAPSULE, LIQUID FILLED ORAL at 12:01

## 2024-01-22 RX ADMIN — ASPIRIN 81 MG CHEWABLE TABLET 324 MG: 81 TABLET CHEWABLE at 12:01

## 2024-01-22 RX ADMIN — LIDOCAINE HYDROCHLORIDE 60 MG: 20 INJECTION, SOLUTION EPIDURAL; INFILTRATION; INTRACAUDAL; PERINEURAL at 09:01

## 2024-01-22 RX ADMIN — DOCUSATE SODIUM 100 MG: 100 CAPSULE, LIQUID FILLED ORAL at 09:01

## 2024-01-22 RX ADMIN — FAMOTIDINE 20 MG: 20 TABLET ORAL at 12:01

## 2024-01-22 RX ADMIN — ONDANSETRON 4 MG: 2 INJECTION INTRAMUSCULAR; INTRAVENOUS at 04:01

## 2024-01-22 RX ADMIN — MUPIROCIN 1 G: 20 OINTMENT TOPICAL at 12:01

## 2024-01-22 NOTE — SUBJECTIVE & OBJECTIVE
Past Medical History:   Diagnosis Date    CHF (congestive heart failure)     COPD (chronic obstructive pulmonary disease)     GERD (gastroesophageal reflux disease)     Hyperlipidemia     Hypertension     Hypokalemia     Insomnia     Nicotine dependence     Osteoporosis        Past Surgical History:   Procedure Laterality Date    ADENOIDECTOMY      COLONOSCOPY N/A 8/8/2023    Procedure: COLONOSCOPY;  Surgeon: Mikhail Marcaon MD;  Location: [facility] ENDO;  Service: Endoscopy;  Laterality: N/A;  Scant ascending colon diverticular disease   sigmoid diverticular     COLONOSCOPY, WITH POLYPECTOMY USING HOT BIOPSY FORCEPS N/A 8/8/2023    Procedure: COLONOSCOPY, WITH POLYPECTOMY USING HOT BIOPSY FORCEPS;  Surgeon: Mikhail Marcano MD;  Location: [facility] ENDO;  Service: Endoscopy;  Laterality: N/A;  B. Polyp at 10-15cm; no heat     COLONOSCOPY, WITH POLYPECTOMY USING SNARE N/A 8/8/2023    Procedure: COLONOSCOPY, WITH POLYPECTOMY USING SNARE;  Surgeon: Mikhail Marcano MD;  Location: [facility] END[facility];  Service: Endoscopy;  Laterality: N/A;  A. appendicle polyp       EGD, WITH CLOSED BIOPSY  07/15/2023    Procedure: EGD, WITH CLOSED BIOPSY;  Surgeon: Tyree Hodges MD;  Location: [facility];  Service: Gastroenterology;;    EGD, WITH HEMORRHAGE CONTROL  07/15/2023    Procedure: EGD,WITH HEMORRHAGE CONTROL;  Surgeon: Tyree Hodges MD;  Location: [facility] OR;  Service: Gastroenterology;;  gold probe 7FR    ESOPHAGOGASTRODUODENOSCOPY N/A 07/15/2023    Procedure: EGD (ESOPHAGOGASTRODUODENOSCOPY);  Surgeon: Tyree Hodges MD;  Location: [facility];  Service: Gastroenterology;  Laterality: N/A;    HEMORRHOID SURGERY      HYSTERECTOMY      TONSILLECTOMY         Review of patient's allergies indicates:  No Known Allergies    Current Facility-Administered Medications   Medication    albuterol-ipratropium 2.5 mg-0.5 mg/3 mL nebulizer solution 3 mL    atorvastatin tablet 20 mg    dextrose 10% bolus 125 mL 125 mL    dextrose 10% bolus 250 mL  250 mL    digoxin tablet 0.125 mg    enoxaparin injection 40 mg    famotidine tablet 20 mg    glucagon (human recombinant) injection 1 mg    glucose chewable tablet 16 g    glucose chewable tablet 24 g    HYDROcodone-acetaminophen 5-325 mg per tablet 1 tablet    HYDROmorphone (PF) injection 0.5 mg    lactated ringers infusion    lisinopriL tablet 2.5 mg    mupirocin 2 % ointment    naloxone 0.4 mg/mL injection 0.02 mg    nicotine 21 mg/24 hr 1 patch    ondansetron injection 4 mg    sodium chloride 0.9% flush 10 mL     Family History       Problem Relation (Age of Onset)    Diabetes Mother    Hypertension Mother, Father          Tobacco Use    Smoking status: Every Day     Current packs/day: 0.50     Types: Cigarettes    Smokeless tobacco: Never   Substance and Sexual Activity    Alcohol use: Yes     Alcohol/week: 2.0 standard drinks of alcohol     Types: 2 Cans of beer per week    Drug use: Never    Sexual activity: Yes     Review of Systems   Constitutional: Negative for chills and fever.   HENT:  Negative for congestion, ear pain and sore throat.    Eyes:  Negative for pain, redness and visual disturbance.   Cardiovascular:  Negative for chest pain, dyspnea on exertion and syncope.   Respiratory:  Negative for cough, shortness of breath and wheezing.    Endocrine: Negative for cold intolerance, heat intolerance and polyuria.   Hematologic/Lymphatic: Does not bruise/bleed easily.   Skin:  Negative for rash and suspicious lesions.   Musculoskeletal:  Positive for joint pain.   Gastrointestinal:  Negative for abdominal pain, nausea and vomiting.   Genitourinary:  Negative for dysuria, frequency and urgency.   Neurological:  Negative for dizziness, focal weakness and seizures.   Psychiatric/Behavioral:  Negative for altered mental status and hallucinations. The patient is not nervous/anxious.      Objective:     Vital Signs (Most Recent):  Temp: 97.8 °F (36.6 °C) (01/22/24 0353)  Pulse: 98 (01/22/24 0749)  Resp: 20  "(01/22/24 0749)  BP: (!) 176/90 (01/22/24 0353)  SpO2: (!) 93 % (01/22/24 0749) Vital Signs (24h Range):  Temp:  [97.5 °F (36.4 °C)-98.9 °F (37.2 °C)] 97.8 °F (36.6 °C)  Pulse:  [] 98  Resp:  [14-22] 20  SpO2:  [90 %-100 %] 93 %  BP: (105-176)/() 176/90     Weight: 48.5 kg (107 lb)  Height: 5' 3" (160 cm)  Body mass index is 18.95 kg/m².      Intake/Output Summary (Last 24 hours) at 1/22/2024 0921  Last data filed at 1/22/2024 0421  Gross per 24 hour   Intake --   Output 1025 ml   Net -1025 ml                    Right Hip Exam   Right hip exam is normal.   Left Hip Exam     Comments:  Leg is slightly externally rotated.  There is extreme pain with minimal range of motion of the hip.  Her skin is intact.  She is neurovascularly intact to the distal extremity.             Significant Labs:   Recent Lab Results         01/22/24  0355   01/21/24  2313   01/21/24  1243        Phencyclidine     Negative       Amphetamine Screen, Ur     Negative       Anion Gap 9.0           Appearance, UA     Clear       aPTT   34.5  Comment: For Minimal Heparin Infusion, the goal aPTT 64-85 seconds corresponds to an anti-Xa of 0.3-0.5.    For Low Intensity and High Intensity Heparin, the goal aPTT  seconds corresponds to an anti-Xa of 0.3-0.7         Bacteria, UA     None Seen       Barbiturate Screen, Ur     Negative       Baso # 0.02           Basophil % 0.2           Benzodiazepine Screen, Urine     Negative       Bilirubin, UA     Negative       BUN 6.0           BUN/CREAT RATIO 10           Calcium 8.9           Cannabinoids, Urine     Negative       Chloride 96           CO2 25           Cocaine (Metab.)     Negative       Color, UA     Yellow       Creatinine 0.63           eGFR >60           Eos # 0.00           Eosinophil % 0.0           Fentanyl, Urine     Negative       Glucose 114           Glucose, UA     Negative       Hematocrit 36.8           Hemoglobin 12.2           Immature Grans (Abs) 0.02           " Immature Granulocytes 0.2           INR   1.0         Ketones, UA     Negative       Leukocytes, UA     Negative       Lymph # 0.60           LYMPH % 7.3           Magnesium  1.80           MCH 29.8           MCHC 33.2           MCV 90.0           MDMA, Urine     Negative       Mono # 0.49           Mono % 6.0           MPV 10.1           Mucous, UA     Trace       Neut # 7.09           Neut % 86.3           NITRITE UA     Negative       Occult Blood UA     1+       Opiate Scrn, Ur     Positive       pH, UA     5.5       pH, Urine     5.5       Platelet Count 228           Potassium 4.0           Protein, UA     Negative       Protime   13.1         RBC 4.09           RBC, UA     0-2       RDW 12.3           Sodium 130           Specific Gravity,UA     1.010       Specific Gravity, Urine Auto     1.010       Squam Epithel, UA     Rare       Urobilinogen, UA     0.2       WBC, UA     0-2       WBC 8.22                 All pertinent labs within the past 24 hours have been reviewed.    Significant Imaging: I have reviewed all pertinent imaging results/findings.

## 2024-01-22 NOTE — ANESTHESIA POSTPROCEDURE EVALUATION
Anesthesia Post Evaluation    Patient: Antonina Jaimes    Procedure(s) Performed: Procedure(s) (LRB):  ARTHROPLASTY, HIP REPLACEMENT (Left)    Final Anesthesia Type: general      Patient location during evaluation: PACU  Patient participation: Yes- Able to Participate  Level of consciousness: awake and alert and oriented  Post-procedure vital signs: reviewed and stable  Pain management: adequate  Airway patency: patent    PONV status at discharge: No PONV  Anesthetic complications: no      Cardiovascular status: stable  Respiratory status: unassisted, spontaneous ventilation and room air  Hydration status: euvolemic  Follow-up not needed.              Vitals Value Taken Time   /71 01/22/24 1239   Temp 36.2 °C (97.2 °F) 01/22/24 1147   Pulse 104 01/22/24 1239   Resp 20 01/22/24 1246   SpO2 92 % 01/22/24 1239         Event Time   Out of Recovery 01/22/2024 12:15:04         Pain/Priscila Score: Pain Rating Prior to Med Admin: 10 (1/22/2024 12:46 PM)  Priscila Score: 8 (1/22/2024 12:00 PM)           None

## 2024-01-22 NOTE — PT/OT/SLP EVAL
Physical Therapy Evaluation    Patient Name:  Antonina Jaimes   MRN:  [id number]    Recommendations:     Discharge Recommendations: High Intensity Therapy   Discharge Equipment Recommendations: walker, rolling ,Shower chair, BSC  Barriers to discharge: None    Assessment:     Antonina Jaimes is a 63 y.o. female admitted with a medical diagnosis of Closed fracture of left hip.  She presents with the following impairments/functional limitations: decreased lower extremity function, pain .    Rehab Prognosis: Good; patient would benefit from acute skilled PT services to address these deficits and reach maximum level of function.    Recent Surgery: Procedure(s) (LRB):  ARTHROPLASTY, HIP REPLACEMENT (Left) Day of Surgery    Plan:     During this hospitalization, patient to be seen 6 x/week to address the identified rehab impairments via gait training, therapeutic activities, therapeutic exercises and progress toward the following goals:    Plan of Care Expires:       Subjective     Chief Complaint: pain  Patient/Family Comments/goals: rtn to daughters home after rehab until she can care for herself  Pain/Comfort:       Patients cultural, spiritual, Church conflicts given the current situation:      Living Environment:  Pt lives at home alone  Prior to admission, patients level of function was I amb.  Equipment used at home: none.  DME owned (not currently used): none.  Upon discharge, patient will have assistance from family.    Objective:     Communicated with pt prior to session.  Patient found HOB elevated with    upon PT entry to room.    General Precautions: Standard,    Orthopedic Precautions:LLE partial weight bearing   Braces:    Respiratory Status: Room air    Exams:  LLE ROM: Deficits: hip precautions    Functional Mobility:  Bed Mobility:     Scooting: total assistance  Supine to sit totalA  Sat EOB and introduced hip protocol ex with ankle pumps and laq  Pt very lethargic, some confusion, shaky, afraid  to mobilize, req max encouragement to sit EOB      AM-PAC 6 CLICK MOBILITY  Total Score:        Treatment & Education:  Hip protocol initiated    Patient left HOB elevated with all lines intact, call button in reach, and bed alarm on.    GOALS:   Multidisciplinary Problems       Physical Therapy Goals          Problem: Physical Therapy    Goal Priority Disciplines Outcome Goal Variances Interventions   Physical Therapy Goal     PT, PT/OT Ongoing, Progressing     Description: 1.  Pt will improve bed mob to [place]  2. Pt will tf to chair [place] AAD  3.  Pt will amb 50ft AAD PwB LLE  4.  Pt will be educated fall prevention and DME recommendations/training                       History:     Past Medical History:   Diagnosis Date    CHF (congestive heart failure)     COPD (chronic obstructive pulmonary disease)     GERD (gastroesophageal reflux disease)     Hyperlipidemia     Hypertension     Hypokalemia     Insomnia     Nicotine dependence     Osteoporosis        Past Surgical History:   Procedure Laterality Date    ADENOIDECTOMY      COLONOSCOPY N/A 8/8/2023    Procedure: COLONOSCOPY;  Surgeon: Mikhail Marcano MD;  Location: [facility];  Service: Endoscopy;  Laterality: N/A;  Scant ascending colon diverticular disease   sigmoid diverticular     COLONOSCOPY, WITH POLYPECTOMY USING HOT BIOPSY FORCEPS N/A 8/8/2023    Procedure: COLONOSCOPY, WITH POLYPECTOMY USING HOT BIOPSY FORCEPS;  Surgeon: Mikhail Marcano MD;  Location: [facility];  Service: Endoscopy;  Laterality: N/A;  B. Polyp at 10-15cm; no heat     COLONOSCOPY, WITH POLYPECTOMY USING SNARE N/A 8/8/2023    Procedure: COLONOSCOPY, WITH POLYPECTOMY USING SNARE;  Surgeon: Mikhail Marcano MD;  Location: [facility];  Service: Endoscopy;  Laterality: N/A;  A. appendicle polyp       EGD, WITH CLOSED BIOPSY  07/15/2023    Procedure: EGD, WITH CLOSED BIOPSY;  Surgeon: Tyree Hodges MD;  Location: [facility];  Service: Gastroenterology;;    EGD, WITH HEMORRHAGE  CONTROL  07/15/2023    Procedure: EGD,WITH HEMORRHAGE CONTROL;  Surgeon: Tyree Hodges MD;  Location: [facility] OR;  Service: Gastroenterology;;  gold probe 7FR    ESOPHAGOGASTRODUODENOSCOPY N/A 07/15/2023    Procedure: EGD (ESOPHAGOGASTRODUODENOSCOPY);  Surgeon: Tyree Hodges MD;  Location: [facility];  Service: Gastroenterology;  Laterality: N/A;    HEMORRHOID SURGERY      HYSTERECTOMY      TONSILLECTOMY         Time Tracking:     PT Received On: 01/22/24  PT Start Time: 1235     PT Stop Time: 1245  PT Total Time (min): 10 min     Billable Minutes: Evaluation 10      01/22/2024

## 2024-01-22 NOTE — PLAN OF CARE
01/22/24 0949   Discharge Assessment   Assessment Type Discharge Planning Assessment   Source of Information patient   People in Home sibling(s)   Do you expect to return to your current living situation? Yes   Do you have help at home or someone to help you manage your care at home? Yes   Who are your caregiver(s) and their phone number(s)? sister   Prior to hospitilization cognitive status: Alert/Oriented;No Deficits   Current cognitive status: Alert/Oriented;No Deficits   Walking or Climbing Stairs Difficulty no   Dressing/Bathing Difficulty no   Equipment Currently Used at Home none   Patient currently being followed by outpatient case management? No   Do you currently have service(s) that help you manage your care at home? No   Do you take prescription medications? Yes   Do you have prescription coverage? Yes   Do you have any problems affording any of your prescribed medications? No   Is the patient taking medications as prescribed? yes   Who is going to help you get home at discharge? sister   How do you get to doctors appointments? family or friend will provide   Are you on dialysis? No   Do you take coumadin? No   Discharge Plan A Home with family;Home Health   Discharge Plan B Home with family   DME Needed Upon Discharge  walker, rolling   Discharge Plan discussed with: Patient   Transition of Care Barriers None   Physical Activity   On average, how many days per week do you engage in moderate to strenuous exercise (like a brisk walk)? Pt Declined   On average, how many minutes do you engage in exercise at this level? Pt Declined   Financial Resource Strain   How hard is it for you to pay for the very basics like food, housing, medical care, and heating? Pt Declined   Housing Stability   In the last 12 months, was there a time when you were not able to pay the mortgage or rent on time? Pt Declined   In the last 12 months, was there a time when you did not have a steady place to sleep or slept in a  shelter (including now)? Pt Declined   Transportation Needs   In the past 12 months, has lack of transportation kept you from medical appointments or from getting medications? Pt Declined   In the past 12 months, has lack of transportation kept you from meetings, work, or from getting things needed for daily living? Pt Declined   Food Insecurity   Within the past 12 months, you worried that your food would run out before you got the money to buy more. Pt Declined   Within the past 12 months, the food you bought just didn't last and you didn't have money to get more. Pt Declined   Stress   Do you feel stress - tense, restless, nervous, or anxious, or unable to sleep at night because your mind is troubled all the time - these days? Pt Declined   Social Connections   In a typical week, how many times do you talk on the phone with family, friends, or neighbors? Pt Declined   How often do you get together with friends or relatives? Pt Declined   How often do you attend Pentecostalism or Sikhism services? Pt Declined   Do you belong to any clubs or organizations such as Pentecostalism groups, unions, fraternal or athletic groups, or school groups? Pt Declined   How often do you attend meetings of the clubs or organizations you belong to? Pt Declined   Are you , , , , never , or living with a partner? Pt Declined   Alcohol Use   Q1: How often do you have a drink containing alcohol? Pt Declined   Q2: How many drinks containing alcohol do you have on a typical day when you are drinking? Pt Declined   Q3: How often do you have six or more drinks on one occasion? Pt Declined     HH referrals sent out to multiple HH agencies.  Pt is [employer], unsure if any HH will take this insurance. If not, plan is outpatient therapy at d/c.  Will order RW for d/c.

## 2024-01-22 NOTE — HPI
This is a 63-year-old female who was transferred to this facility yesterday from [place].  Patient is awake and alone in her room this morning when seen.  She states that she got up from a chair and fell.  She states that she does have a bad floor in her house with some holes and bumps and probably tripped over that.  She landed on her left side had immediate extreme pain in the left hip.  She was examined and x-rayed and found to have a left femoral neck fracture.  She was transferred here for orthopedic services.  She will require endoprosthesis of the left hip.  Patient denies any other injuries during the fall.  She does take aspirin 81 mg a day denies any other blood thinners.  She has a medical history of CHF COPD (she is not on home oxygen)  GERD, hypertension, hyperlipidemia, hypokalemia and osteoporosis.  It was reported by paramedics as she had several empty beer cans in her residence.  Her ETOH was greater than 300 and she tested positive for opiates.  Risks and benefits of the surgery were discussed with the patient, questions were answered and consents were signed.  The patient states that she normally ambulates without an assistive device.  She does have a walker at home from a previous leg injury.  She does live alone but states that the plan is for her to go to her daughter's residence which is in [place].  We will consult case management for discharge planning.  The patient will most likely be partial weight-bearing postoperatively.

## 2024-01-22 NOTE — TRANSFER OF CARE
"Anesthesia Transfer of Care Note    Patient: Antonina Jaimes    Procedure(s) Performed: Procedure(s) (LRB):  ARTHROPLASTY, HIP REPLACEMENT (Left)    Patient location: PACU    Anesthesia Type: general    Transport from OR: Transported from OR on room air with adequate spontaneous ventilation    Post pain: adequate analgesia    Post assessment: no apparent anesthetic complications    Post vital signs: stable    Level of consciousness: sedated    Nausea/Vomiting: no nausea/vomiting    Complications: none    Transfer of care protocol was followed      Last vitals: Visit Vitals  BP (!) 169/81   Pulse (!) 113   Temp 36.9 °C (98.5 °F) (Oral)   Resp 20   Ht 5' 3" (1.6 m)   Wt 48.5 kg (107 lb)   SpO2 (!) 91%   BMI 18.95 kg/m²     "

## 2024-01-22 NOTE — PROGRESS NOTES
[facility]  [facility] Medicine  Progress Note    Patient Name: Antonina Jaimes  MRN: [id number]  Patient Class: IP- Inpatient   Admission Date: 1/21/2024  Length of Stay: 1 days  Attending Physician: Williams Caceres MD  Primary Care Provider: Donis Mcneil MD        Subjective:     Principal Problem:Closed fracture of left hip    Interval History:   HPI:   Ms. Jaimes is a 63-year-old female who presented initially to the ED and [place], [place] following a ground level fall at home and subsequent left hip pain.  The injury occurred about 1 hour prior to her presentation.  Evaluation in the ED there revealed a subcapital left femoral neck fracture.  She was transferred here for orthopedic evaluation and management.  She was also noted to be intoxicated and had an ETOH level greater than 300.  In addition to this reportedly also received IV Ativan and morphine in the ED.  On seeing her following her arrival to the med surge unit, she is comfortable in bed, no other complaints, appears intoxicated.     1/22-there were no events overnight; when seen on rounds this morning was doing well, pain controlled; normal mental status; she was preparing to go to the OR for ORIF      Objective:     Vital Signs (Most Recent):  Temp: 97.2 °F (36.2 °C) (01/22/24 1147)  Pulse: (!) 113 (01/22/24 1150)  Resp: 15 (01/22/24 1150)  BP: (!) 140/81 (01/22/24 1150)  SpO2: 99 % (01/22/24 1150) Vital Signs (24h Range):  Temp:  [97.2 °F (36.2 °C)-98.9 °F (37.2 °C)] 97.2 °F (36.2 °C)  Pulse:  [] 113  Resp:  [10-22] 15  SpO2:  [90 %-99 %] 99 %  BP: (105-176)/(51-90) 140/81     Weight: 48.5 kg (107 lb)  Body mass index is 18.95 kg/m².    Intake/Output Summary (Last 24 hours) at 1/22/2024 1205  Last data filed at 1/22/2024 1058  Gross per 24 hour   Intake 1450 ml   Output 1375 ml   Net 75 ml      Physical exam  Constitution-well nourished, normally developed female in NAD  HENT-normocephalic,  atraumatic  Neck-supple  Respiratory-normal respirations  Heart-RRR  Abdomen-soft, nontender, nondistended; active bowel sounds  Genitourinary-deferred  Musculoskeletal-no joint abnormalities, normal ROM throughout with exception of left hip  Skin-warm, dry; no rashes  Neurologic-alert and oriented x3; nonfocal exam    Scheduled Meds:   albuterol-ipratropium  3 mL Nebulization Q6H WAKE    aspirin  324 mg Oral BID    atorvastatin  20 mg Oral Daily    ceFAZolin (Ancef) IV (PEDS and ADULTS)  2 g Intravenous Q6H    digoxin  0.125 mg Oral Daily    docusate sodium  100 mg Oral Q12H    enoxparin  40 mg Subcutaneous Q24H (prophylaxis, 1700)    famotidine  20 mg Oral BID    lisinopriL  2.5 mg Oral Daily    mupirocin   Nasal BID    nicotine  1 patch Transdermal Daily     Continuous Infusions:   lactated ringers 100 mL/hr at 01/22/24 1058    lactated ringers       PRN Meds:.bisacodyL, dextrose 10%, dextrose 10%, fentaNYL, glucagon (human recombinant), glucose, glucose, HYDROcodone-acetaminophen, HYDROmorphone, HYDROmorphone, melatonin, morphine, naloxone, ondansetron, oxyCODONE, promethazine, sodium chloride 0.9%, sodium chloride 0.9%, traMADoL    Significant Labs: All pertinent labs within the past 24 hours have been reviewed.  CBC:   Recent Labs   Lab 01/21/24  0654 01/22/24  0355   WBC 5.92 8.22   HGB 15.0 12.2   HCT 44.8 36.8*    228     CMP:   Recent Labs   Lab 01/21/24  0654 01/22/24  0355   * 130*   K 3.7 4.0   CO2 28 25   BUN 3.0* 6.0*   CREATININE 0.61 0.63   CALCIUM 9.0 8.9   ALBUMIN 4.4  --    BILITOT 0.3  --    ALKPHOS 78  --    AST 27  --    ALT 21  --      Magnesium:   Recent Labs   Lab 01/22/24  0355   MG 1.80       Significant Imaging: I have reviewed all pertinent imaging results/findings within the past 24 hours.  XR pelvis, left hip  Impression:  Minimally displaced subcapital left femoral neck fracture.    Procedures  left  hip unipolar arthroplasty     Implant type:  [employer] porous-coated stem  size 4 std standard, unipolar head 43  mm diameter     Surgeon: Oleg Diaz MD    Assessment/Plan:   Closed, left femoral neck fracture  S/p ORIF per Dr. Diaz, 1/22  PT/OT    ETOH intoxication  Patient denies drinking daily, has no history of withdrawal symptoms  Monitor for ETOH withdrawal  IV hydration     COPD exacerbation/tobacco use disorder  Bronchodilator therapy  Nicotine patch per patient request     Hypertension  Continue routine medication     Patient noted to be on digoxin therapy  We will continue medication while attempting to find out why she is on this    Hyponatremia, mild  Continue hydration with isotonic fluids     VTE prophylaxis-Lovenox  GI prophylaxis-famotidine  Active Diagnoses:    Diagnosis Date Noted POA    PRINCIPAL PROBLEM:  Closed fracture of left hip [S72.002A] 01/21/2024 Yes      Problems Resolved During this Admission:     VTE Risk Mitigation (From admission, onward)           Ordered     IP VTE LOW RISK PATIENT  Once         01/22/24 1144     Place ANDREW hose  Until discontinued         01/22/24 1144     Place sequential compression device  Until discontinued         01/22/24 1144     enoxaparin injection 40 mg  Every 24 hours         01/21/24 1359     Place sequential compression device  Until discontinued         01/21/24 1256                       Williams Caceres MD  Department of Hospital Medicine   [facility]

## 2024-01-22 NOTE — CONSULTS
[facility]  Orthopedics  Consult Note    Patient Name: Antonina Jaimes  MRN: [id number]  Admission Date: 1/21/2024  Hospital Length of Stay: 1 days  Attending Provider: Williams Caceres MD  Primary Care Provider: Donis Mcneil MD    Patient information was obtained from patient and ER records.     Consults  Subjective:     Principal Problem:Closed fracture of left hip    Chief Complaint:   Chief Complaint   Patient presents with    Hip Pain     Sent from [facility] for orthopedic services due to L hip fracture secondary to a fall this morning.        HPI: This is a 63-year-old female who was transferred to this facility yesterday from [place].  Patient is awake and alone in her room this morning when seen.  She states that she got up from a chair and fell.  She states that she does have a bad floor in her house with some holes and bumps and probably tripped over that.  She landed on her left side had immediate extreme pain in the left hip.  She was examined and x-rayed and found to have a left femoral neck fracture.  She was transferred here for orthopedic services.  She will require endoprosthesis of the left hip.  Patient denies any other injuries during the fall.  She does take aspirin 81 mg a day denies any other blood thinners.  She has a medical history of CHF COPD (she is not on home oxygen)  GERD, hypertension, hyperlipidemia, hypokalemia and osteoporosis.  It was reported by paramedics as she had several empty beer cans in her residence.  Her ETOH was greater than 300 and she tested positive for opiates.  Risks and benefits of the surgery were discussed with the patient, questions were answered and consents were signed.  The patient states that she normally ambulates without an assistive device.  She does have a walker at home from a previous leg injury.  She does live alone but states that the plan is for her to go to her daughter's residence which is in  [place].  We will consult case management for discharge planning.  The patient will most likely be partial weight-bearing postoperatively.    Past Medical History:   Diagnosis Date    CHF (congestive heart failure)     COPD (chronic obstructive pulmonary disease)     GERD (gastroesophageal reflux disease)     Hyperlipidemia     Hypertension     Hypokalemia     Insomnia     Nicotine dependence     Osteoporosis        Past Surgical History:   Procedure Laterality Date    ADENOIDECTOMY      COLONOSCOPY N/A 8/8/2023    Procedure: COLONOSCOPY;  Surgeon: Mikhail Marcano MD;  Location: [facility];  Service: Endoscopy;  Laterality: N/A;  Scant ascending colon diverticular disease   sigmoid diverticular     COLONOSCOPY, WITH POLYPECTOMY USING HOT BIOPSY FORCEPS N/A 8/8/2023    Procedure: COLONOSCOPY, WITH POLYPECTOMY USING HOT BIOPSY FORCEPS;  Surgeon: Mikhail Marcano MD;  Location: [facility];  Service: Endoscopy;  Laterality: N/A;  B. Polyp at 10-15cm; no heat     COLONOSCOPY, WITH POLYPECTOMY USING SNARE N/A 8/8/2023    Procedure: COLONOSCOPY, WITH POLYPECTOMY USING SNARE;  Surgeon: Mikhail Marcano MD;  Location: [facility];  Service: Endoscopy;  Laterality: N/A;  A. appendicle polyp       EGD, WITH CLOSED BIOPSY  07/15/2023    Procedure: EGD, WITH CLOSED BIOPSY;  Surgeon: Tyree Hodges MD;  Location: [facility];  Service: Gastroenterology;;    EGD, WITH HEMORRHAGE CONTROL  07/15/2023    Procedure: EGD,WITH HEMORRHAGE CONTROL;  Surgeon: Tyree Hodges MD;  Location: [facility] OR;  Service: Gastroenterology;;  gold probe 7FR    ESOPHAGOGASTRODUODENOSCOPY N/A 07/15/2023    Procedure: EGD (ESOPHAGOGASTRODUODENOSCOPY);  Surgeon: Tyree Hodges MD;  Location: [facility];  Service: Gastroenterology;  Laterality: N/A;    HEMORRHOID SURGERY      HYSTERECTOMY      TONSILLECTOMY         Review of patient's allergies indicates:  No Known Allergies    Current Facility-Administered Medications   Medication     albuterol-ipratropium 2.5 mg-0.5 mg/3 mL nebulizer solution 3 mL    atorvastatin tablet 20 mg    dextrose 10% bolus 125 mL 125 mL    dextrose 10% bolus 250 mL 250 mL    digoxin tablet 0.125 mg    enoxaparin injection 40 mg    famotidine tablet 20 mg    glucagon (human recombinant) injection 1 mg    glucose chewable tablet 16 g    glucose chewable tablet 24 g    HYDROcodone-acetaminophen 5-325 mg per tablet 1 tablet    HYDROmorphone (PF) injection 0.5 mg    lactated ringers infusion    lisinopriL tablet 2.5 mg    mupirocin 2 % ointment    naloxone 0.4 mg/mL injection 0.02 mg    nicotine 21 mg/24 hr 1 patch    ondansetron injection 4 mg    sodium chloride 0.9% flush 10 mL     Family History       Problem Relation (Age of Onset)    Diabetes Mother    Hypertension Mother, Father          Tobacco Use    Smoking status: Every Day     Current packs/day: 0.50     Types: Cigarettes    Smokeless tobacco: Never   Substance and Sexual Activity    Alcohol use: Yes     Alcohol/week: 2.0 standard drinks of alcohol     Types: 2 Cans of beer per week    Drug use: Never    Sexual activity: Yes     Review of Systems   Constitutional: Negative for chills and fever.   HENT:  Negative for congestion, ear pain and sore throat.    Eyes:  Negative for pain, redness and visual disturbance.   Cardiovascular:  Negative for chest pain, dyspnea on exertion and syncope.   Respiratory:  Negative for cough, shortness of breath and wheezing.    Endocrine: Negative for cold intolerance, heat intolerance and polyuria.   Hematologic/Lymphatic: Does not bruise/bleed easily.   Skin:  Negative for rash and suspicious lesions.   Musculoskeletal:  Positive for joint pain.   Gastrointestinal:  Negative for abdominal pain, nausea and vomiting.   Genitourinary:  Negative for dysuria, frequency and urgency.   Neurological:  Negative for dizziness, focal weakness and seizures.   Psychiatric/Behavioral:  Negative for altered mental status and hallucinations. The  "patient is not nervous/anxious.      Objective:     Vital Signs (Most Recent):  Temp: 97.8 °F (36.6 °C) (01/22/24 0353)  Pulse: 98 (01/22/24 0749)  Resp: 20 (01/22/24 0749)  BP: (!) 176/90 (01/22/24 0353)  SpO2: (!) 93 % (01/22/24 0749) Vital Signs (24h Range):  Temp:  [97.5 °F (36.4 °C)-98.9 °F (37.2 °C)] 97.8 °F (36.6 °C)  Pulse:  [] 98  Resp:  [14-22] 20  SpO2:  [90 %-100 %] 93 %  BP: (105-176)/() 176/90     Weight: 48.5 kg (107 lb)  Height: 5' 3" (160 cm)  Body mass index is 18.95 kg/m².      Intake/Output Summary (Last 24 hours) at 1/22/2024 0921  Last data filed at 1/22/2024 0421  Gross per 24 hour   Intake --   Output 1025 ml   Net -1025 ml                    Right Hip Exam   Right hip exam is normal.   Left Hip Exam     Comments:  Leg is slightly externally rotated.  There is extreme pain with minimal range of motion of the hip.  Her skin is intact.  She is neurovascularly intact to the distal extremity.             Significant Labs:   Recent Lab Results         01/22/24  0355   01/21/24  2313   01/21/24  1243        Phencyclidine     Negative       Amphetamine Screen, Ur     Negative       Anion Gap 9.0           Appearance, UA     Clear       aPTT   34.5  Comment: For Minimal Heparin Infusion, the goal aPTT 64-85 seconds corresponds to an anti-Xa of 0.3-0.5.    For Low Intensity and High Intensity Heparin, the goal aPTT  seconds corresponds to an anti-Xa of 0.3-0.7         Bacteria, UA     None Seen       Barbiturate Screen, Ur     Negative       Baso # 0.02           Basophil % 0.2           Benzodiazepine Screen, Urine     Negative       Bilirubin, UA     Negative       BUN 6.0           BUN/CREAT RATIO 10           Calcium 8.9           Cannabinoids, Urine     Negative       Chloride 96           CO2 25           Cocaine (Metab.)     Negative       Color, UA     Yellow       Creatinine 0.63           eGFR >60           Eos # 0.00           Eosinophil % 0.0           Fentanyl, Urine     " Negative       Glucose 114           Glucose, UA     Negative       Hematocrit 36.8           Hemoglobin 12.2           Immature Grans (Abs) 0.02           Immature Granulocytes 0.2           INR   1.0         Ketones, UA     Negative       Leukocytes, UA     Negative       Lymph # 0.60           LYMPH % 7.3           Magnesium  1.80           MCH 29.8           MCHC 33.2           MCV 90.0           MDMA, Urine     Negative       Mono # 0.49           Mono % 6.0           MPV 10.1           Mucous, UA     Trace       Neut # 7.09           Neut % 86.3           NITRITE UA     Negative       Occult Blood UA     1+       Opiate Scrn, Ur     Positive       pH, UA     5.5       pH, Urine     5.5       Platelet Count 228           Potassium 4.0           Protein, UA     Negative       Protime   13.1         RBC 4.09           RBC, UA     0-2       RDW 12.3           Sodium 130           Specific Gravity,UA     1.010       Specific Gravity, Urine Auto     1.010       Squam Epithel, UA     Rare       Urobilinogen, UA     0.2       WBC, UA     0-2       WBC 8.22                 All pertinent labs within the past 24 hours have been reviewed.    Significant Imaging: I have reviewed all pertinent imaging results/findings.  Assessment/Plan:     * Closed fracture of left hip  Closed left femoral neck fracture.    We will proceed with endoprosthesis of the left hip.  Patient has been NPO since midnight.    Consult case management for discharge planning, likely home with home health to her daughter's residence.  She will likely be partial weight-bearing postoperatively.    She will follow up with Dr. Diaz 2 weeks postoperatively.  Thank you for the consultation.        Thank you for your consult. I will follow-up with patient. Please contact us if you have any additional questions.    FOREIGN Rich  Orthopedics  [facility]

## 2024-01-22 NOTE — ASSESSMENT & PLAN NOTE
Closed left femoral neck fracture.    We will proceed with endoprosthesis of the left hip.  Patient has been NPO since midnight.    Consult case management for discharge planning, likely home with home health to her daughter's residence.  She will likely be partial weight-bearing postoperatively.    She will follow up with Dr. Diaz 2 weeks postoperatively.  Thank you for the consultation.

## 2024-01-22 NOTE — ANESTHESIA PREPROCEDURE EVALUATION
01/22/2024  Antonina Jaimes is a 63 y.o., female.      Pre-op Assessment    I have reviewed the Patient Summary Reports.     I have reviewed the Nursing Notes. I have reviewed the NPO Status.   I have reviewed the Medications.     Review of Systems  Anesthesia Hx:             Denies Family Hx of Anesthesia complications.    Denies Personal Hx of Anesthesia complications.                    Social:  Alcohol Use, Smoker       Hematology/Oncology:  Hematology Normal   Oncology Normal                                   EENT/Dental:  EENT/Dental Normal           Cardiovascular:     Hypertension, well controlled       CHF       ECG has been reviewed.                          Pulmonary:   COPD, moderate                     Renal/:  Renal/ Normal                 Hepatic/GI:     GERD, well controlled             Musculoskeletal:  Musculoskeletal Normal                Neurological:  Neurology Normal                                      Endocrine:  Endocrine Normal            Dermatological:  Skin Normal    Psych:  Psychiatric Normal                    Physical Exam  General: Cooperative, Alert and Oriented    Airway:  Mallampati: II   Mouth Opening: Normal  TM Distance: Normal  Tongue: Normal  Neck ROM: Normal ROM    Dental:  Intact        Anesthesia Plan  Type of Anesthesia, risks & benefits discussed:    Anesthesia Type: Gen Supraglottic Airway  Intra-op Monitoring Plan: Standard ASA Monitors  Post Op Pain Control Plan:   (medical reason for not using multimodal pain management)  Induction:  IV  Informed Consent: Informed consent signed with the Patient and all parties understand the risks and agree with anesthesia plan.  All questions answered. Patient consented to blood products? Yes  ASA Score: 3    Ready For Surgery From Anesthesia Perspective.     .

## 2024-01-22 NOTE — OR NURSING
Update called to daughter (Bea). Will continue to update. Verbalizes understanding.     [id number] update called to daughter on procedure completion. Instructed on pt going to recovery. Verbalizes understanding.

## 2024-01-22 NOTE — OP NOTE
OPERATIVE REPORT      Date: 1/22/2024     Preop Diagnosis:  left  femoral neck hip fracture    Postop Diagnosis:  Same    Procedure:  left  hip unipolar arthroplasty    Implant type:  Meg[place]el porous-coated stem size 4 std standard, unipolar head 43  mm diameter    Surgeon: Oleg Diaz MD    Assistant: Breanna Dow    Blood loss:  150 cc    Complications:  None    Procedure in detail:  Informed consent was obtained.  Risks of the procedure was explained in detail with the patient and family not excluding infection, bleeding, pain, scarring, neurovascular injury, leg-length discrepancy, dislocation, periprosthetic fracture, need for revision surgery, DVT, pulmonary embolism, even death.  She was brought to the OR and given IV antibiotics and tranexamic acid.  She was given a spinal anesthetic.  She was prepped and draped in lateral decubitus position exposing the right hip.  A lateral incision was made with the ITB band was divided.  A Charnley retractor was placed exposing the abductor.  The anterior 2/3 of the gluteus medius was taken down with the Bovie and preserved for later reattachment.  The gluteus minimus was also taken down and preserved for later reattachment.  The capsule was opened anteriorly with a scalpel and hemarthrosis was identified.  Femoral neck fracture was exposed with the leg at the side of the table.  Using Hohmann's protecting soft tissue, an oscillating saw was used to shorten the femoral neck.  The acetabulum was exposed after removing the femoral head.  Femoral head was sized for a 43 .  The acetabulum was free of any significant arthritic disease.  Next the femoral canal was prepared with a cookie cutter, reaming, and broaching up to a size 4 with good fill and fit.  The hip was taken through a trial range of motion.  I selected a -3 tapered spacer.  This gave her equal leg lengths.  The final implant was carefully tapped into position with good fill and fit.  The calcar was checked  and I saw no evidence of fracture.  The hip was stable leg length was checked again and confirmed to be satisfactory.  The wound was she had a slight fracture over the anterior femoral neck that did not extend down into the calcar but for prophylactic stabilization I passed 1 cerclage cable.  Pulsavac lavaged and dried.  I injected the surgical field with Marcaine.  The capsule was closed with interrupted 1. Vicryl.  The minimus was reattached with 1. Vicryl.  The medius was meticulously repaired with interrupted 1. Vicryl.  The ITB band was closed with a running 1. Vicryl.  The subQ was closed with a running 2 0.  This skin was stapled sterile dressing applied.  There were no complications.        Oleg Diaz MD  Department of Orthopedic Surgery  [facility]  [address] [place] [address]  Phone: [phone]

## 2024-01-23 LAB
ANION GAP SERPL CALC-SCNC: 7 MEQ/L
BUN SERPL-MCNC: 5 MG/DL (ref 9.8–20.1)
CALCIUM SERPL-MCNC: 8.5 MG/DL (ref 8.4–10.2)
CHLORIDE SERPL-SCNC: 98 MMOL/L (ref 98–107)
CO2 SERPL-SCNC: 27 MMOL/L (ref 23–31)
CREAT SERPL-MCNC: 0.56 MG/DL (ref 0.55–1.02)
CREAT/UREA NIT SERPL: 9
GFR SERPLBLD CREATININE-BSD FMLA CKD-EPI: >60 MLS/MIN/1.73/M2
GLUCOSE SERPL-MCNC: 115 MG/DL (ref 82–115)
HCT VFR BLD AUTO: 30.5 % (ref 37–47)
HGB BLD-MCNC: 10.1 G/DL (ref 12–16)
POTASSIUM SERPL-SCNC: 3.6 MMOL/L (ref 3.5–5.1)
SODIUM SERPL-SCNC: 132 MMOL/L (ref 136–145)

## 2024-01-23 PROCEDURE — 25000003 PHARM REV CODE 250: Performed by: EMERGENCY MEDICINE

## 2024-01-23 PROCEDURE — 97530 THERAPEUTIC ACTIVITIES: CPT

## 2024-01-23 PROCEDURE — S4991 NICOTINE PATCH NONLEGEND: HCPCS | Performed by: EMERGENCY MEDICINE

## 2024-01-23 PROCEDURE — 94761 N-INVAS EAR/PLS OXIMETRY MLT: CPT

## 2024-01-23 PROCEDURE — 94799 UNLISTED PULMONARY SVC/PX: CPT | Mod: XB

## 2024-01-23 PROCEDURE — 80048 BASIC METABOLIC PNL TOTAL CA: CPT | Performed by: PHYSICIAN ASSISTANT

## 2024-01-23 PROCEDURE — 97110 THERAPEUTIC EXERCISES: CPT

## 2024-01-23 PROCEDURE — 25000003 PHARM REV CODE 250: Performed by: PHYSICIAN ASSISTANT

## 2024-01-23 PROCEDURE — 63600175 PHARM REV CODE 636 W HCPCS: Performed by: PHYSICIAN ASSISTANT

## 2024-01-23 PROCEDURE — 25000242 PHARM REV CODE 250 ALT 637 W/ HCPCS: Performed by: EMERGENCY MEDICINE

## 2024-01-23 PROCEDURE — 94640 AIRWAY INHALATION TREATMENT: CPT

## 2024-01-23 PROCEDURE — 85018 HEMOGLOBIN: CPT | Performed by: PHYSICIAN ASSISTANT

## 2024-01-23 PROCEDURE — 97116 GAIT TRAINING THERAPY: CPT

## 2024-01-23 PROCEDURE — 99900035 HC TECH TIME PER 15 MIN (STAT)

## 2024-01-23 PROCEDURE — 11000001 HC ACUTE MED/SURG PRIVATE ROOM

## 2024-01-23 RX ADMIN — ONDANSETRON 4 MG: 2 INJECTION INTRAMUSCULAR; INTRAVENOUS at 12:01

## 2024-01-23 RX ADMIN — HYDROCODONE BITARTRATE AND ACETAMINOPHEN 1 TABLET: 5; 325 TABLET ORAL at 05:01

## 2024-01-23 RX ADMIN — ASPIRIN 81 MG CHEWABLE TABLET 324 MG: 81 TABLET CHEWABLE at 08:01

## 2024-01-23 RX ADMIN — DOCUSATE SODIUM 100 MG: 100 CAPSULE, LIQUID FILLED ORAL at 08:01

## 2024-01-23 RX ADMIN — ONDANSETRON 4 MG: 2 INJECTION INTRAMUSCULAR; INTRAVENOUS at 04:01

## 2024-01-23 RX ADMIN — IPRATROPIUM BROMIDE AND ALBUTEROL SULFATE 3 ML: .5; 3 SOLUTION RESPIRATORY (INHALATION) at 07:01

## 2024-01-23 RX ADMIN — FAMOTIDINE 20 MG: 20 TABLET ORAL at 09:01

## 2024-01-23 RX ADMIN — OXYCODONE HYDROCHLORIDE 10 MG: 5 TABLET ORAL at 08:01

## 2024-01-23 RX ADMIN — NICOTINE 1 PATCH: 21 PATCH, EXTENDED RELEASE TRANSDERMAL at 09:01

## 2024-01-23 RX ADMIN — LISINOPRIL 2.5 MG: 2.5 TABLET ORAL at 09:01

## 2024-01-23 RX ADMIN — MUPIROCIN 1 G: 20 OINTMENT TOPICAL at 08:01

## 2024-01-23 RX ADMIN — FAMOTIDINE 20 MG: 20 TABLET ORAL at 08:01

## 2024-01-23 RX ADMIN — OXYCODONE HYDROCHLORIDE 10 MG: 5 TABLET ORAL at 11:01

## 2024-01-23 RX ADMIN — SODIUM CHLORIDE, POTASSIUM CHLORIDE, SODIUM LACTATE AND CALCIUM CHLORIDE: 600; 310; 30; 20 INJECTION, SOLUTION INTRAVENOUS at 06:01

## 2024-01-23 RX ADMIN — ATORVASTATIN CALCIUM 20 MG: 20 TABLET, FILM COATED ORAL at 08:01

## 2024-01-23 RX ADMIN — SODIUM CHLORIDE, POTASSIUM CHLORIDE, SODIUM LACTATE AND CALCIUM CHLORIDE: 600; 310; 30; 20 INJECTION, SOLUTION INTRAVENOUS at 03:01

## 2024-01-23 RX ADMIN — DOCUSATE SODIUM 100 MG: 100 CAPSULE, LIQUID FILLED ORAL at 09:01

## 2024-01-23 RX ADMIN — ONDANSETRON 4 MG: 2 INJECTION INTRAMUSCULAR; INTRAVENOUS at 08:01

## 2024-01-23 RX ADMIN — DIGOXIN 0.12 MG: 125 TABLET ORAL at 09:01

## 2024-01-23 RX ADMIN — MUPIROCIN 1 G: 20 OINTMENT TOPICAL at 09:01

## 2024-01-23 RX ADMIN — OXYCODONE HYDROCHLORIDE 10 MG: 5 TABLET ORAL at 04:01

## 2024-01-23 RX ADMIN — IPRATROPIUM BROMIDE AND ALBUTEROL SULFATE 3 ML: .5; 3 SOLUTION RESPIRATORY (INHALATION) at 01:01

## 2024-01-23 NOTE — PROGRESS NOTES
[facility] Surgical Unit  Orthopedics  Progress Note    Patient Name: Antonina Jaimes  MRN: [id number]  Admission Date: 1/21/2024  Hospital Length of Stay: 2 days  Attending Provider: Williams Caceres MD  Primary Care Provider: Donis Mcneil MD  Follow-up For: Procedure(s) (LRB):  ARTHROPLASTY, HIP REPLACEMENT (Left)    Post-Operative Day: 1 Day Post-Op  Subjective:     Principal Problem:Closed fracture of left hip    Principal Orthopedic Problem:  Left femoral neck fracture    Interval History:  Patient is postop day 1.  From a left hip endoprosthesis, secondary to fracture sustained from a fall at home.  She is sitting in bedside chair when seen this morning no family present.  She did participate in do well with physical therapy walking down the [place]arris.  She is partial weight-bearing with a walker.  Discharge plan for the patient was to go to her daughter's home with home health however insurance does not cover home health.  She does not have transportation to get to outpatient physical therapy.  Case management is working on options for the patient likely in regards to acute inpatient rehab versus nursing home.  She is on aspirin 325 b.i.d. for DVT prophylaxis.    Review of patient's allergies indicates:  No Known Allergies    Current Facility-Administered Medications   Medication    albuterol-ipratropium 2.5 mg-0.5 mg/3 mL nebulizer solution 3 mL    aspirin chewable tablet 324 mg    atorvastatin tablet 20 mg    bisacodyL suppository 10 mg    dextrose 10% bolus 125 mL 125 mL    dextrose 10% bolus 250 mL 250 mL    digoxin tablet 0.125 mg    docusate sodium capsule 100 mg    famotidine tablet 20 mg    glucagon (human recombinant) injection 1 mg    glucose chewable tablet 16 g    glucose chewable tablet 24 g    HYDROcodone-acetaminophen 5-325 mg per tablet 1 tablet    lactated ringers infusion    lactated ringers infusion    lisinopriL tablet 2.5 mg    melatonin tablet 6 mg    morphine  "injection 2 mg    mupirocin 2 % ointment    naloxone 0.4 mg/mL injection 0.02 mg    nicotine 21 mg/24 hr 1 patch    ondansetron injection 4 mg    oxyCODONE immediate release tablet 10 mg    promethazine tablet 25 mg    sodium chloride 0.9% flush 10 mL    traMADoL tablet 50 mg     Objective:     Vital Signs (Most Recent):  Temp: 97.9 °F (36.6 °C) (01/23/24 0744)  Pulse: 92 (01/23/24 0744)  Resp: 20 (01/23/24 0727)  BP: 121/73 (01/23/24 0744)  SpO2: (!) 94 % (01/23/24 0744) Vital Signs (24h Range):  Temp:  [97.2 °F (36.2 °C)-98.1 °F (36.7 °C)] 97.9 °F (36.6 °C)  Pulse:  [] 92  Resp:  [10-20] 20  SpO2:  [88 %-99 %] 94 %  BP: (104-164)/(55-91) 121/73     Weight: 48.5 kg (107 lb)  Height: 5' 3" (160 cm)  Body mass index is 18.95 kg/m².      Intake/Output Summary (Last 24 hours) at 1/23/2024 0945  Last data filed at 1/23/2024 0800  Gross per 24 hour   Intake 2130.63 ml   Output 1650 ml   Net 480.63 ml        General    Vitals reviewed.  Constitutional: She is oriented to person, place, and time. She appears well-developed and well-nourished.   HENT:   Head: Normocephalic and atraumatic.   Eyes: EOM are normal. Pupils are equal, round, and reactive to light.   Neck: Neck supple.   Cardiovascular:  Normal rate.            Pulmonary/Chest: Effort normal. No respiratory distress.   Abdominal: Soft. She exhibits no distension. There is no abdominal tenderness.   Neurological: She is alert and oriented to person, place, and time.   Psychiatric: She has a normal mood and affect. Her behavior is normal.             Right Hip Exam   Right hip exam is normal.   Left Hip Exam     Other   Sensation: normal    Comments:  Aquacel dressing to the posterolateral hip is clean dry and intact.  There is no excessive swelling or erythema noted about the incision area.  Patient is neurovascular intact to the distal extremity.  There is moderate discomfort with range of motion of the hip to be expected following " surgery.          Vascular Exam       Left Pulses  Dorsalis Pedis:      2+          Capillary Refill  Left Hip: brisk         Significant Labs:   Recent Lab Results         01/23/24  0359        Anion Gap 7.0       BUN 5.0       BUN/CREAT RATIO 9       Calcium 8.5       Chloride 98       CO2 27       Creatinine 0.56       eGFR >60       Glucose 115       Hematocrit 30.5       Hemoglobin 10.1       Potassium 3.6       Sodium 132             All pertinent labs within the past 24 hours have been reviewed.    Significant Imaging: I have reviewed all pertinent imaging results/findings.  Assessment/Plan:     * Closed fracture of left hip  Postop day 1., left hip endoprosthesis secondary to Closed left femoral neck fracture.    Continue current medical management and physical therapy, partial weight-bearing left lower extremity.  Case management working on discharge planning.    Aspirin 325 mg b.i.d. and bilateral Ronaldo hose for DVT prophylaxis.    Patient will follow up with Dr. Diaz 2 weeks postoperatively.    Aquacel dressing may stay in place until follow-up appointment.          FOREIGN Rich  Orthopedics  [facility]

## 2024-01-23 NOTE — PT/OT/SLP PROGRESS
"Physical Therapy Treatment    Patient Name:  Antonina Jaimes   MRN:  [id number]    Recommendations:     Discharge Recommendations: High Intensity Therapy  Discharge Equipment Recommendations: walker, rolling BSC TTB  Barriers to discharge: Decreased caregiver support    Assessment:     Antonina Jaimes is a 63 y.o. female admitted with a medical diagnosis of Closed fracture of left hip.  She presents with the following impairments/functional limitations: decreased lower extremity function, pain .    Rehab Prognosis: Good; patient would benefit from acute skilled PT services to address these deficits and reach maximum level of function.    Recent Surgery: Procedure(s) (LRB):  ARTHROPLASTY, HIP REPLACEMENT (Left) 1 Day Post-Op    Plan:     During this hospitalization, patient to be seen 6 x/week to address the identified rehab impairments via gait training, therapeutic activities, therapeutic exercises and progress toward the following goals:    Plan of Care Expires:       Subjective     Chief Complaint: "I am too weak to go home" Pt requests further rehab.  Patient/Family Comments/goals: to get strong enough to walk without physical assistance  Pain/Comfort:         Objective:     Communicated with pt and caregiver prior to session.  Patient found HOB elevated with   upon PT entry to room.     General Precautions: Standard,    Orthopedic Precautions: LLE partial weight bearing  Braces:    Respiratory Status: Room air     Functional Mobility:  Bed Mobility:     Supine to Sit: moderate assistance  Transfers:     Sit to Stand:  moderate assistance with rolling walker  Gait: amb 60ft, 50ftx2 RW PwB Lle mod-[place] with 2 episodes of leaning backward, LOB, req physical steady assist to prevent a fall      AM-PAC 6 CLICK MOBILITY          Treatment & Education:  Hip protocol ex instructed    Patient left with hob up with bed alarm on, cargiver present with all lines intact and call button in reach..    GOALS: "   Multidisciplinary Problems       Physical Therapy Goals          Problem: Physical Therapy    Goal Priority Disciplines Outcome Goal Variances Interventions   Physical Therapy Goal     PT, PT/OT Ongoing, Progressing     Description: 1.  Pt will improve bed mob to Ham[place]  2. Pt will tf to chair [place] AAD  3.  Pt will amb 50ft AAD PwB LLE  4.  Pt will be educated fall prevention and DME recommendations/training                       Time Tracking:     PT Received On: 01/23/24  PT Start Time: 1400     PT Stop Time: 1430  PT Total Time (min): 30 min     Billable Minutes: Gait Training 15 and Therapeutic Exercise 5  Therapeutic Activity 10    Treatment Type: Treatment  PT/PTA: PT           01/23/2024

## 2024-01-23 NOTE — PLAN OF CARE
Met w/ daughter and discussed d/c plan.  Informed her that I cannot get HH for her due to insurance that noone will take her.  She understood.  Spoke to her about Acute IP rehab and she [facility].  Referral sent. She is also going to speak to PT and ask them for assistance in teaching her what to do for her mom, in the case that she ends up taking her home.  They met in the all with her and will plan teaching.    Patient already has a RW at home, Kevin voided the RW request.

## 2024-01-23 NOTE — ASSESSMENT & PLAN NOTE
Postop day 1., left hip endoprosthesis secondary to Closed left femoral neck fracture.    Continue current medical management and physical therapy, partial weight-bearing left lower extremity.  Case management working on discharge planning.    Aspirin 325 mg b.i.d. and bilateral Ronaldo hose for DVT prophylaxis.    Patient will follow up with Dr. Diaz 2 weeks postoperatively.    Aquacel dressing may stay in place until follow-up appointment.

## 2024-01-23 NOTE — PT/OT/SLP PROGRESS
"Physical Therapy Treatment    Patient Name:  Antonina Jaimes   MRN:  [id number]    Recommendations:     Discharge Recommendations: High Intensity Therapy  Discharge Equipment Recommendations: walker, rolling BSC TTB  Barriers to discharge: Decreased caregiver support    Assessment:     Antonina Jaimes is a 63 y.o. female admitted with a medical diagnosis of Closed fracture of left hip.  She presents with the following impairments/functional limitations: decreased lower extremity function, pain .    Rehab Prognosis: Good; patient would benefit from acute skilled PT services to address these deficits and reach maximum level of function.    Recent Surgery: Procedure(s) (LRB):  ARTHROPLASTY, HIP REPLACEMENT (Left) 1 Day Post-Op    Plan:     During this hospitalization, patient to be seen 6 x/week to address the identified rehab impairments via gait training, therapeutic activities, therapeutic exercises and progress toward the following goals:    Plan of Care Expires:       Subjective     Chief Complaint: "I am too weak to go home" Pt requests further rehab.  Patient/Family Comments/goals: to get strong enough to walk without physical assistance  Pain/Comfort:         Objective:     Communicated with pt and caregiver prior to session.  Patient found HOB elevated with   upon PT entry to room.     General Precautions: Standard,    Orthopedic Precautions: LLE partial weight bearing  Braces:    Respiratory Status: Room air     Functional Mobility:  Bed Mobility:     Supine to Sit: moderate assistance  Transfers:     Sit to Stand:  moderate assistance with rolling walker  Gait: amb 10ft, 50ftx2 RW PwB Lle Ax2 helpers      AM-PAC 6 CLICK MOBILITY          Treatment & Education:  Hip protocol ex instructed    Patient left up in chair with all lines intact and call button in reach..    GOALS:   Multidisciplinary Problems       Physical Therapy Goals          Problem: Physical Therapy    Goal Priority Disciplines Outcome Goal " Variances Interventions   Physical Therapy Goal     PT, PT/OT Ongoing, Progressing     Description: 1.  Pt will improve bed mob to Ham[place]  2. Pt will tf to chair [place] AAD  3.  Pt will amb 50ft AAD PwB LLE  4.  Pt will be educated fall prevention and DME recommendations/training                       Time Tracking:     PT Received On: 01/23/24  PT Start Time: 0815     PT Stop Time: 0845  PT Total Time (min): 30 min     Billable Minutes: Gait Training 15 and Therapeutic Exercise 15    Treatment Type: Treatment  PT/PTA: PT           01/23/2024

## 2024-01-23 NOTE — PLAN OF CARE
[facility] submitted to insurance for auth. If she is denied by insurance, the daughter said she will take her home and will time to set up a twin bed in front room before d/c.

## 2024-01-23 NOTE — SUBJECTIVE & OBJECTIVE
Principal Problem:Closed fracture of left hip    Principal Orthopedic Problem:  Left femoral neck fracture    Interval History:  Patient is postop day 1.  From a left hip endoprosthesis, secondary to fracture sustained from a fall at home.  She is sitting in bedside chair when seen this morning no family present.  She did participate in do well with physical therapy walking down the [place].  She is partial weight-bearing with a walker.  Discharge plan for the patient was to go to her daughter's home with home health however insurance does not cover home health.  She does not have transportation to get to outpatient physical therapy.  Case management is working on options for the patient likely in regards to acute inpatient rehab versus nursing home.  She is on aspirin 325 b.i.d. for DVT prophylaxis.    Review of patient's allergies indicates:  No Known Allergies    Current Facility-Administered Medications   Medication    albuterol-ipratropium 2.5 mg-0.5 mg/3 mL nebulizer solution 3 mL    aspirin chewable tablet 324 mg    atorvastatin tablet 20 mg    bisacodyL suppository 10 mg    dextrose 10% bolus 125 mL 125 mL    dextrose 10% bolus 250 mL 250 mL    digoxin tablet 0.125 mg    docusate sodium capsule 100 mg    famotidine tablet 20 mg    glucagon (human recombinant) injection 1 mg    glucose chewable tablet 16 g    glucose chewable tablet 24 g    HYDROcodone-acetaminophen 5-325 mg per tablet 1 tablet    lactated ringers infusion    lactated ringers infusion    lisinopriL tablet 2.5 mg    melatonin tablet 6 mg    morphine injection 2 mg    mupirocin 2 % ointment    naloxone 0.4 mg/mL injection 0.02 mg    nicotine 21 mg/24 hr 1 patch    ondansetron injection 4 mg    oxyCODONE immediate release tablet 10 mg    promethazine tablet 25 mg    sodium chloride 0.9% flush 10 mL    traMADoL tablet 50 mg     Objective:     Vital Signs (Most Recent):  Temp: 97.9 °F (36.6 °C) (01/23/24 0744)  Pulse: 92 (01/23/24 0744)  Resp: 20  "(01/23/24 0727)  BP: 121/73 (01/23/24 0744)  SpO2: (!) 94 % (01/23/24 0744) Vital Signs (24h Range):  Temp:  [97.2 °F (36.2 °C)-98.1 °F (36.7 °C)] 97.9 °F (36.6 °C)  Pulse:  [] 92  Resp:  [10-20] 20  SpO2:  [88 %-99 %] 94 %  BP: (104-164)/(55-91) 121/73     Weight: 48.5 kg (107 lb)  Height: 5' 3" (160 cm)  Body mass index is 18.95 kg/m².      Intake/Output Summary (Last 24 hours) at 1/23/2024 0945  Last data filed at 1/23/2024 0800  Gross per 24 hour   Intake 2130.63 ml   Output 1650 ml   Net 480.63 ml        General    Vitals reviewed.  Constitutional: She is oriented to person, place, and time. She appears well-developed and well-nourished.   HENT:   Head: Normocephalic and atraumatic.   Eyes: EOM are normal. Pupils are equal, round, and reactive to light.   Neck: Neck supple.   Cardiovascular:  Normal rate.            Pulmonary/Chest: Effort normal. No respiratory distress.   Abdominal: Soft. She exhibits no distension. There is no abdominal tenderness.   Neurological: She is alert and oriented to person, place, and time.   Psychiatric: She has a normal mood and affect. Her behavior is normal.             Right Hip Exam   Right hip exam is normal.   Left Hip Exam     Other   Sensation: normal    Comments:  Aquacel dressing to the posterolateral hip is clean dry and intact.  There is no excessive swelling or erythema noted about the incision area.  Patient is neurovascular intact to the distal extremity.  There is moderate discomfort with range of motion of the hip to be expected following surgery.          Vascular Exam       Left Pulses  Dorsalis Pedis:      2+          Capillary Refill  Left Hip: brisk         Significant Labs:   Recent Lab Results         01/23/24  0359        Anion Gap 7.0       BUN 5.0       BUN/CREAT RATIO 9       Calcium 8.5       Chloride 98       CO2 27       Creatinine 0.56       eGFR >60       Glucose 115       Hematocrit 30.5       Hemoglobin 10.1       Potassium 3.6       " Sodium 132             All pertinent labs within the past 24 hours have been reviewed.    Significant Imaging: I have reviewed all pertinent imaging results/findings.

## 2024-01-23 NOTE — PROGRESS NOTES
[facility] Surgical [facility]  [facility] Medicine  Progress Note    Patient Name: Antonina Jaimes  MRN: [id number]  Patient Class: IP- Inpatient   Admission Date: 1/21/2024  Length of Stay: 2 days  Attending Physician: Williams Caceres MD  Primary Care Provider: Donis Mcneil MD        Subjective:     Principal Problem:Closed fracture of left hip    Interval History:   HPI:   Ms. Jaimes is a 63-year-old female who presented initially to the ED and [place], [place] following a ground level fall at home and subsequent left hip pain.  The injury occurred about 1 hour prior to her presentation.  Evaluation in the ED there revealed a subcapital left femoral neck fracture.  She was transferred here for orthopedic evaluation and management.  She was also noted to be intoxicated and had an ETOH level greater than 300.  In addition to this reportedly also received IV Ativan and morphine in the ED.  On seeing her following her arrival to the med surge unit, she is comfortable in bed, no other complaints, appears intoxicated.     1/22-there were no events overnight; when seen on rounds this morning was doing well, pain controlled; normal mental status; she was preparing to go to the OR for ORIF    January 23, 2024-patient got ORIF surgery yesterday, no complaints, no fever, no shortness for breath, waiting for rehab placement  Objective:     Vital Signs (Most Recent):  Temp: 98.2 °F (36.8 °C) (01/23/24 1106)  Pulse: 103 (01/23/24 1106)  Resp: 20 (01/23/24 1120)  BP: 107/64 (01/23/24 1106)  SpO2: (!) 94 % (01/23/24 1106) Vital Signs (24h Range):  Temp:  [97.2 °F (36.2 °C)-98.2 °F (36.8 °C)] 98.2 °F (36.8 °C)  Pulse:  [] 103  Resp:  [18-20] 20  SpO2:  [91 %-97 %] 94 %  BP: (104-134)/(55-83) 107/64     Weight: 48.5 kg (107 lb)  Body mass index is 18.95 kg/m².    Intake/Output Summary (Last 24 hours) at 1/23/2024 1235  Last data filed at 1/23/2024 0800  Gross per 24 hour   Intake 680.63 ml    Output 1300 ml   Net -619.37 ml        Physical exam  Constitution-well nourished, normally developed female in NAD  HENT-normocephalic, atraumatic  Neck-supple  Respiratory-normal respirations  Heart-RRR  Abdomen-soft, nontender, nondistended; active bowel sounds  Genitourinary-deferred  Musculoskeletal-no joint abnormalities, normal ROM throughout with exception of left hip  Skin-warm, dry; no rashes  Neurologic-alert and oriented x3; nonfocal exam    Scheduled Meds:   albuterol-ipratropium  3 mL Nebulization Q6H WAKE    aspirin  324 mg Oral BID    atorvastatin  20 mg Oral Daily    digoxin  0.125 mg Oral Daily    docusate sodium  100 mg Oral Q12H    famotidine  20 mg Oral BID    lisinopriL  2.5 mg Oral Daily    mupirocin   Nasal BID    nicotine  1 patch Transdermal Daily     Continuous Infusions:   lactated ringers 100 mL/hr at 01/22/24 1058    lactated ringers 75 mL/hr at 01/23/24 0617     PRN Meds:.bisacodyL, dextrose 10%, dextrose 10%, glucagon (human recombinant), glucose, glucose, HYDROcodone-acetaminophen, melatonin, morphine, naloxone, ondansetron, oxyCODONE, promethazine, sodium chloride 0.9%, traMADoL    Significant Labs: All pertinent labs within the past 24 hours have been reviewed.  CBC:   Recent Labs   Lab 01/22/24  0355 01/23/24  0359   WBC 8.22  --    HGB 12.2 10.1*   HCT 36.8* 30.5*     --        CMP:   Recent Labs   Lab 01/22/24  0355 01/23/24  0359   * 132*   K 4.0 3.6   CO2 25 27   BUN 6.0* 5.0*   CREATININE 0.63 0.56   CALCIUM 8.9 8.5       Magnesium:   Recent Labs   Lab 01/22/24  0355   MG 1.80         Significant Imaging: I have reviewed all pertinent imaging results/findings within the past 24 hours.  XR pelvis, left hip  Impression:  Minimally displaced subcapital left femoral neck fracture.    Procedures  left  hip unipolar arthroplasty     Implant type:  [place] porous-coated stem size 4 std standard, unipolar head 43  mm diameter     Surgeon: Oleg Diaz,  MD    Assessment/Plan:   Closed, left femoral neck fracture  S/p ORIF per Dr. Diaz, 1/22  PT/OT  Consult  for rehab placement       ETOH intoxication  Patient denies drinking daily, has no history of withdrawal symptoms  Monitor for ETOH withdrawal  IV hydration     COPD exacerbation/tobacco use disorder  Bronchodilator therapy  Nicotine patch per patient request     Hypertension  Continue routine medication     Patient noted to be on digoxin therapy  We will continue medication while attempting to find out why she is on this    Hyponatremia, mild  Continue hydration with isotonic fluids     VTE prophylaxis-Lovenox  GI prophylaxis-famotidine  Active Diagnoses:    Diagnosis Date Noted POA    PRINCIPAL PROBLEM:  Closed fracture of left hip [S72.002A] 01/21/2024 Yes      Problems Resolved During this Admission:     VTE Risk Mitigation (From admission, onward)           Ordered     IP VTE LOW RISK PATIENT  Once         01/22/24 1144     Place ANDREW hose  Until discontinued         01/22/24 1144     Place sequential compression device  Until discontinued         01/22/24 1144     Place sequential compression device  Until discontinued         01/21/24 1256                       Luisa Shelley MD  Department of Hospital Medicine   [facility]

## 2024-01-24 LAB
ANION GAP SERPL CALC-SCNC: 8 MEQ/L
BUN SERPL-MCNC: 3 MG/DL (ref 9.8–20.1)
CALCIUM SERPL-MCNC: 8.4 MG/DL (ref 8.4–10.2)
CHLORIDE SERPL-SCNC: 100 MMOL/L (ref 98–107)
CO2 SERPL-SCNC: 28 MMOL/L (ref 23–31)
CREAT SERPL-MCNC: 0.52 MG/DL (ref 0.55–1.02)
CREAT/UREA NIT SERPL: 6
GFR SERPLBLD CREATININE-BSD FMLA CKD-EPI: >60 MLS/MIN/1.73/M2
GLUCOSE SERPL-MCNC: 83 MG/DL (ref 82–115)
HCT VFR BLD AUTO: 31.5 % (ref 37–47)
HGB BLD-MCNC: 10.2 G/DL (ref 12–16)
POTASSIUM SERPL-SCNC: 3.5 MMOL/L (ref 3.5–5.1)
PSYCHE PATHOLOGY RESULT: NORMAL
SODIUM SERPL-SCNC: 136 MMOL/L (ref 136–145)

## 2024-01-24 PROCEDURE — S4991 NICOTINE PATCH NONLEGEND: HCPCS | Performed by: EMERGENCY MEDICINE

## 2024-01-24 PROCEDURE — 63600175 PHARM REV CODE 636 W HCPCS: Performed by: EMERGENCY MEDICINE

## 2024-01-24 PROCEDURE — 80048 BASIC METABOLIC PNL TOTAL CA: CPT | Performed by: PHYSICIAN ASSISTANT

## 2024-01-24 PROCEDURE — 94640 AIRWAY INHALATION TREATMENT: CPT

## 2024-01-24 PROCEDURE — 85018 HEMOGLOBIN: CPT | Performed by: PHYSICIAN ASSISTANT

## 2024-01-24 PROCEDURE — 63600175 PHARM REV CODE 636 W HCPCS: Performed by: PHYSICIAN ASSISTANT

## 2024-01-24 PROCEDURE — 94761 N-INVAS EAR/PLS OXIMETRY MLT: CPT

## 2024-01-24 PROCEDURE — 97116 GAIT TRAINING THERAPY: CPT

## 2024-01-24 PROCEDURE — 25000242 PHARM REV CODE 250 ALT 637 W/ HCPCS: Performed by: EMERGENCY MEDICINE

## 2024-01-24 PROCEDURE — 97530 THERAPEUTIC ACTIVITIES: CPT

## 2024-01-24 PROCEDURE — 11000001 HC ACUTE MED/SURG PRIVATE ROOM

## 2024-01-24 PROCEDURE — 94799 UNLISTED PULMONARY SVC/PX: CPT | Mod: XB

## 2024-01-24 PROCEDURE — 99900035 HC TECH TIME PER 15 MIN (STAT)

## 2024-01-24 PROCEDURE — 25000003 PHARM REV CODE 250: Performed by: EMERGENCY MEDICINE

## 2024-01-24 PROCEDURE — 25000003 PHARM REV CODE 250: Performed by: PHYSICIAN ASSISTANT

## 2024-01-24 RX ADMIN — IPRATROPIUM BROMIDE AND ALBUTEROL SULFATE 3 ML: .5; 3 SOLUTION RESPIRATORY (INHALATION) at 01:01

## 2024-01-24 RX ADMIN — IPRATROPIUM BROMIDE AND ALBUTEROL SULFATE 3 ML: .5; 3 SOLUTION RESPIRATORY (INHALATION) at 09:01

## 2024-01-24 RX ADMIN — FAMOTIDINE 20 MG: 20 TABLET ORAL at 09:01

## 2024-01-24 RX ADMIN — IPRATROPIUM BROMIDE AND ALBUTEROL SULFATE 3 ML: .5; 3 SOLUTION RESPIRATORY (INHALATION) at 07:01

## 2024-01-24 RX ADMIN — DOCUSATE SODIUM 100 MG: 100 CAPSULE, LIQUID FILLED ORAL at 08:01

## 2024-01-24 RX ADMIN — SODIUM CHLORIDE, POTASSIUM CHLORIDE, SODIUM LACTATE AND CALCIUM CHLORIDE: 600; 310; 30; 20 INJECTION, SOLUTION INTRAVENOUS at 02:01

## 2024-01-24 RX ADMIN — ASPIRIN 81 MG CHEWABLE TABLET 324 MG: 81 TABLET CHEWABLE at 09:01

## 2024-01-24 RX ADMIN — OXYCODONE HYDROCHLORIDE 10 MG: 5 TABLET ORAL at 07:01

## 2024-01-24 RX ADMIN — LISINOPRIL 2.5 MG: 2.5 TABLET ORAL at 09:01

## 2024-01-24 RX ADMIN — MUPIROCIN 1 G: 20 OINTMENT TOPICAL at 08:01

## 2024-01-24 RX ADMIN — MUPIROCIN 1 G: 20 OINTMENT TOPICAL at 09:01

## 2024-01-24 RX ADMIN — FAMOTIDINE 20 MG: 20 TABLET ORAL at 08:01

## 2024-01-24 RX ADMIN — DIGOXIN 0.12 MG: 125 TABLET ORAL at 09:01

## 2024-01-24 RX ADMIN — ONDANSETRON 4 MG: 2 INJECTION INTRAMUSCULAR; INTRAVENOUS at 08:01

## 2024-01-24 RX ADMIN — SODIUM CHLORIDE, POTASSIUM CHLORIDE, SODIUM LACTATE AND CALCIUM CHLORIDE: 600; 310; 30; 20 INJECTION, SOLUTION INTRAVENOUS at 04:01

## 2024-01-24 RX ADMIN — ASPIRIN 81 MG CHEWABLE TABLET 324 MG: 81 TABLET CHEWABLE at 08:01

## 2024-01-24 RX ADMIN — ATORVASTATIN CALCIUM 20 MG: 20 TABLET, FILM COATED ORAL at 09:01

## 2024-01-24 RX ADMIN — DOCUSATE SODIUM 100 MG: 100 CAPSULE, LIQUID FILLED ORAL at 09:01

## 2024-01-24 RX ADMIN — NICOTINE 1 PATCH: 21 PATCH, EXTENDED RELEASE TRANSDERMAL at 09:01

## 2024-01-24 RX ADMIN — OXYCODONE HYDROCHLORIDE 10 MG: 5 TABLET ORAL at 01:01

## 2024-01-24 RX ADMIN — OXYCODONE HYDROCHLORIDE 10 MG: 5 TABLET ORAL at 08:01

## 2024-01-24 NOTE — PLAN OF CARE
[facility]ed [facility] [phone] to f/u on status of referral- left message requesting a return call.    1315  Rec'd call back from [facility] and was informed that they have submitted to pt's Medicaid and are awaiting authorization.  They will contact when a determination is reached.  Updated pt's family.    1551  Rec'd communication from Rayne with [facility] that Medicaid is requesting a P2P. To be done within 2 business days of denial.  [facility]. Ph# [phone]  and must include the member name and  auth # [id number].  She asked to call back if any questions.    0874  Communication sent to Breanna Maciel NP and to inform and  ask time that it can be done.

## 2024-01-24 NOTE — PT/OT/SLP PROGRESS
"Physical Therapy Treatment    Patient Name:  Antonina Jaimes   MRN:  [id number]    Recommendations:     Discharge Recommendations: High Intensity Therapy  Discharge Equipment Recommendations: walker, rolling BSC TTB  Barriers to discharge: Decreased caregiver support    Assessment:     Antonina Jaimes is a 63 y.o. female admitted with a medical diagnosis of Closed fracture of left hip.  She presents with the following impairments/functional limitations: decreased lower extremity function, pain .    Rehab Prognosis: Good; patient would benefit from acute skilled PT services to address these deficits and reach maximum level of function.    Recent Surgery: Procedure(s) (LRB):  ARTHROPLASTY, HIP REPLACEMENT (Left) 2 Days Post-Op    Plan:     During this hospitalization, patient to be seen 6 x/week to address the identified rehab impairments via gait training, therapeutic activities, therapeutic exercises and progress toward the following goals:    Plan of Care Expires:       Subjective     Chief Complaint: "I am too weak to go home" Pt requests further rehab.  Patient/Family Comments/goals: to get strong enough to walk without physical assistance  Pain/Comfort:         Objective:     Communicated with pt and caregiver prior to session.  Patient found up in chair with   upon PT entry to room.     General Precautions: Standard,    Orthopedic Precautions: LLE partial weight bearing  Braces:    Respiratory Status: Room air     Functional Mobility:  Bed Mobility:     Supine to Sit: moderate assistance  Transfers:     Sit to Stand:  moderate assistance with rolling walker  Gait: amb 62ft, 50ftx2 RW PwB Lle [place] with physical steady assist to prevent a fall      AM-PAC 6 CLICK MOBILITY          Treatment & Education:  Hip protocol ex instructed    Patient left with hob up with bed alarm on, cargiver present with all lines intact and call button in reach..    GOALS:   Multidisciplinary Problems       Physical Therapy Goals  "         Problem: Physical Therapy    Goal Priority Disciplines Outcome Goal Variances Interventions   Physical Therapy Goal     PT, PT/OT Ongoing, Progressing     Description: 1.  Pt will improve bed mob to Jacobo  2. Pt will tf to chair Jacobo AAD  3.  Pt will amb 150ft AAD PwB LLE SBA  4.  Pt will be educated fall prevention and DME recommendations/training                       Time Tracking:     PT Received On: 01/24/24  PT Start Time: 1345     PT Stop Time: 1400  PT Total Time (min): 15 min     Billable Minutes: Gait Training 15 and Therapeutic Exercise 5  Therapeutic Activity 0    Treatment Type: Treatment  PT/PTA: PT           01/24/2024

## 2024-01-24 NOTE — PROGRESS NOTES
[facility]  [facility] Medicine  Progress Note    Patient Name: Antonina Jaimes  MRN: [id number]  Patient Class: IP- Inpatient   Admission Date: 1/21/2024  Length of Stay: 3 days  Attending Physician: Williams Caceres MD  Primary Care Provider: Donis Mcneil MD        Subjective:     Principal Problem:Closed fracture of left hip    Interval History:   HPI:   Ms. Jaimes is a 63-year-old female who presented initially to the ED and [place], [place] following a ground level fall at home and subsequent left hip pain.  The injury occurred about 1 hour prior to her presentation.  Evaluation in the ED there revealed a subcapital left femoral neck fracture.  She was transferred here for orthopedic evaluation and management.  She was also noted to be intoxicated and had an ETOH level greater than 300.  In addition to this reportedly also received IV Ativan and morphine in the ED.  On seeing her following her arrival to the med surge unit, she is comfortable in bed, no other complaints, appears intoxicated.     1/22-there were no events overnight; when seen on rounds this morning was doing well, pain controlled; normal mental status; she was preparing to go to the OR for ORIF    January 23, 2024-patient got ORIF surgery yesterday, no complaints, no fever, no shortness for breath, waiting for rehab placement    January 24, 2024-the patient was sitting at the chair watching TV, no fever, no shortness for breath, no nausea  Objective:     Vital Signs (Most Recent):  Temp: 98.5 °F (36.9 °C) (01/24/24 0815)  Pulse: (!) 128 (01/24/24 0815)  Resp: 18 (01/24/24 0751)  BP: 127/73 (01/24/24 0815)  SpO2: (!) 92 % (01/24/24 0815) Vital Signs (24h Range):  Temp:  [98.1 °F (36.7 °C)-98.5 °F (36.9 °C)] 98.5 °F (36.9 °C)  Pulse:  [] 128  Resp:  [18-20] 18  SpO2:  [92 %-97 %] 92 %  BP: ()/(58-75) 127/73     Weight: 48.5 kg (107 lb)  Body mass index is 18.95 kg/m².    Intake/Output  "Summary (Last 24 hours) at 1/24/2024 1122  Last data filed at 1/24/2024 0924  Gross per 24 hour   Intake 2164.12 ml   Output 2450 ml   Net -285.88 ml        Physical exam  Constitution-well nourished, normally developed female in NAD  HENT-normocephalic, atraumatic  Neck-supple  Respiratory-normal respirations  Heart-RRR  Abdomen-soft, nontender, nondistended; active bowel sounds  Genitourinary-deferred  Musculoskeletal-no joint abnormalities, normal ROM throughout with exception of left hip  Skin-warm, dry; no rashes  Neurologic-alert and oriented x3; nonfocal exam    Scheduled Meds:   albuterol-ipratropium  3 mL Nebulization Q6H WAKE    aspirin  324 mg Oral BID    atorvastatin  20 mg Oral Daily    digoxin  0.125 mg Oral Daily    docusate sodium  100 mg Oral Q12H    famotidine  20 mg Oral BID    lisinopriL  2.5 mg Oral Daily    mupirocin   Nasal BID    nicotine  1 patch Transdermal Daily     Continuous Infusions:   lactated ringers 100 mL/hr at 01/24/24 0433    lactated ringers 75 mL/hr at 01/23/24 1747     PRN Meds:.bisacodyL, dextrose 10%, dextrose 10%, glucagon (human recombinant), glucose, glucose, HYDROcodone-acetaminophen, melatonin, morphine, naloxone, ondansetron, oxyCODONE, promethazine, sodium chloride 0.9%, traMADoL    Significant Labs: All pertinent labs within the past 24 hours have been reviewed.  CBC:   Recent Labs   Lab 01/23/24  0359 01/24/24  0414   HGB 10.1* 10.2*   HCT 30.5* 31.5*       CMP:   Recent Labs   Lab 01/23/24  0359 01/24/24  0414   * 136   K 3.6 3.5   CO2 27 28   BUN 5.0* 3.0*   CREATININE 0.56 0.52*   CALCIUM 8.5 8.4       Magnesium:   No results for input(s): "MG" in the last 48 hours.      Significant Imaging: I have reviewed all pertinent imaging results/findings within the past 24 hours.  XR pelvis, left hip  Impression:  Minimally displaced subcapital left femoral neck fracture.    Procedures  left  hip unipolar arthroplasty     Implant type:  [place] porous-coated stem " size 4 std standard, unipolar head 43  mm diameter     Surgeon: Oleg Diaz MD    Assessment/Plan:   Closed, left femoral neck fracture  S/p ORIF per Dr. Diaz, 1/22  PT/OT  Consult  for rehab placement       ETOH intoxication  Patient denies drinking daily, has no history of withdrawal symptoms  Monitor for ETOH withdrawal  IV hydration     COPD exacerbation/tobacco use disorder  Bronchodilator therapy  Nicotine patch per patient request     Hypertension  Continue routine medication     Patient noted to be on digoxin therapy  We will continue medication while attempting to find out why she is on this    Hyponatremia, mild--sodium level is stable       VTE prophylaxis-Lovenox  GI prophylaxis-famotidine  Active Diagnoses:    Diagnosis Date Noted POA    PRINCIPAL PROBLEM:  Closed fracture of left hip [S72.002A] 01/21/2024 Yes      Problems Resolved During this Admission:     VTE Risk Mitigation (From admission, onward)           Ordered     IP VTE LOW RISK PATIENT  Once         01/22/24 1144     Place ANDREW hose  Until discontinued         01/22/24 1144     Place sequential compression device  Until discontinued         01/22/24 1144     Place sequential compression device  Until discontinued         01/21/24 1256                       Luisa Shelley MD  Department of Hospital Medicine   [facility]

## 2024-01-24 NOTE — ASSESSMENT & PLAN NOTE
Postop day 2., left hip endoprosthesis secondary to Closed left femoral neck fracture.    Continue current medical management and physical therapy, partial weight-bearing left lower extremity.  Case management working on [facility] planning [facility]  Aspirin 325 mg b.i.d. and bilateral Ronaldo hose for DVT prophylaxis.    Patient will follow up with Dr. Diaz 2 weeks postoperatively.    Aquacel dressing may stay in place until follow-up appointment.

## 2024-01-24 NOTE — PROGRESS NOTES
[facility]  Orthopedics  Progress Note    Patient Name: Antonina Jaimes  MRN: [id number]  Admission Date: 1/21/2024  Hospital Length of Stay: 3 days  Attending Provider: Williams Caceres MD  Primary Care Provider: Donis Mcneil MD  Follow-up For: Procedure(s) (LRB):  ARTHROPLASTY, HIP REPLACEMENT (Left)    Post-Operative Day: 2 Days Post-Op  Subjective:     Principal Problem:Closed fracture of left hip    Principal Orthopedic Problem:  Left femoral neck fracture    Interval History:  The patient is postop day 2. From a left hip endoprosthesis secondary to a fracture sustained from a fall at home.  The patient is sitting in bedside chair when seen this morning.  Daughter is present in the room as well.  She has been participating well with therapy.  Case management is working on placement at [facility].  Waiting on insurance approval.  She is partial weight-bearing to the left lower extremity with the use of a walker.    Review of patient's allergies indicates:  No Known Allergies    Current Facility-Administered Medications   Medication    albuterol-ipratropium 2.5 mg-0.5 mg/3 mL nebulizer solution 3 mL    aspirin chewable tablet 324 mg    atorvastatin tablet 20 mg    bisacodyL suppository 10 mg    dextrose 10% bolus 125 mL 125 mL    dextrose 10% bolus 250 mL 250 mL    digoxin tablet 0.125 mg    docusate sodium capsule 100 mg    famotidine tablet 20 mg    glucagon (human recombinant) injection 1 mg    glucose chewable tablet 16 g    glucose chewable tablet 24 g    HYDROcodone-acetaminophen 5-325 mg per tablet 1 tablet    lactated ringers infusion    lactated ringers infusion    lisinopriL tablet 2.5 mg    melatonin tablet 6 mg    morphine injection 2 mg    mupirocin 2 % ointment    naloxone 0.4 mg/mL injection 0.02 mg    nicotine 21 mg/24 hr 1 patch    ondansetron injection 4 mg    oxyCODONE immediate release tablet 10 mg    promethazine tablet 25 mg    sodium chloride  "0.9% flush 10 mL    traMADoL tablet 50 mg     Objective:     Vital Signs (Most Recent):  Temp: 98.5 °F (36.9 °C) (01/24/24 0815)  Pulse: (!) 128 (01/24/24 0815)  Resp: 18 (01/24/24 0751)  BP: 127/73 (01/24/24 0815)  SpO2: (!) 92 % (01/24/24 0815) Vital Signs (24h Range):  Temp:  [98.1 °F (36.7 °C)-98.5 °F (36.9 °C)] 98.5 °F (36.9 °C)  Pulse:  [] 128  Resp:  [18-20] 18  SpO2:  [92 %-97 %] 92 %  BP: ()/(58-75) 127/73     Weight: 48.5 kg (107 lb)  Height: 5' 3" (160 cm)  Body mass index is 18.95 kg/m².      Intake/Output Summary (Last 24 hours) at 1/24/2024 1101  Last data filed at 1/24/2024 0924  Gross per 24 hour   Intake 2164.12 ml   Output 2450 ml   Net -285.88 ml        General    Vitals reviewed.  Constitutional: She is oriented to person, place, and time. She appears well-developed and well-nourished.   HENT:   Head: Normocephalic and atraumatic.   Eyes: EOM are normal. Pupils are equal, round, and reactive to light.   Neck: Neck supple.   Cardiovascular:  Normal rate.            Pulmonary/Chest: Effort normal. No respiratory distress.   Abdominal: Soft. She exhibits no distension. There is no abdominal tenderness.   Neurological: She is alert and oriented to person, place, and time.   Psychiatric: She has a normal mood and affect. Her behavior is normal.             Right Hip Exam   Right hip exam is normal.   Left Hip Exam     Other   Sensation: normal    Comments:  Aquacel dressing to the posterolateral hip is clean dry and intact.  There is no excessive swelling or erythema noted about the incision area.  Patient is neurovascular intact to the distal extremity.  There is moderate discomfort with range of motion of the hip to be expected following surgery.  Polar Care is in place          Vascular Exam       Left Pulses  Dorsalis Pedis:      2+          Capillary Refill  Left Hip: brisk         Significant Labs:   Recent Lab Results         01/24/24  0414        Anion Gap 8.0       BUN 3.0       " BUN/CREAT RATIO 6       Calcium 8.4       Chloride 100       CO2 28       Creatinine 0.52       eGFR >60       Glucose 83       Hematocrit 31.5       Hemoglobin 10.2       Potassium 3.5       Sodium 136             All pertinent labs within the past 24 hours have been reviewed.    Significant Imaging: None  Assessment/Plan:     * Closed fracture of left hip  Postop day 2., left hip endoprosthesis secondary to Closed left femoral neck fracture.    Continue current medical management and physical therapy, partial weight-bearing left lower extremity.  Case management working on discharge planning [facility]  Aspirin 325 mg b.i.d. and bilateral Ronaldo hose for DVT prophylaxis.    Patient will follow up with Dr. Diaz 2 weeks postoperatively.    Aquacel dressing may stay in place until follow-up appointment.          FOREIGN Rich  Orthopedics  [facility]

## 2024-01-24 NOTE — SUBJECTIVE & OBJECTIVE
Principal Problem:Closed fracture of left hip    Principal Orthopedic Problem:  Left femoral neck fracture    Interval History:  The patient is postop day 2. From a left hip endoprosthesis secondary to a fracture sustained from a fall at home.  The patient is sitting in bedside chair when seen this morning.  Daughter is present in the room as well.  She has been participating well with therapy.  Case management is working on placement at [facility].  Waiting on insurance approval.  She is partial weight-bearing to the left lower extremity with the use of a walker.    Review of patient's allergies indicates:  No Known Allergies    Current Facility-Administered Medications   Medication    albuterol-ipratropium 2.5 mg-0.5 mg/3 mL nebulizer solution 3 mL    aspirin chewable tablet 324 mg    atorvastatin tablet 20 mg    bisacodyL suppository 10 mg    dextrose 10% bolus 125 mL 125 mL    dextrose 10% bolus 250 mL 250 mL    digoxin tablet 0.125 mg    docusate sodium capsule 100 mg    famotidine tablet 20 mg    glucagon (human recombinant) injection 1 mg    glucose chewable tablet 16 g    glucose chewable tablet 24 g    HYDROcodone-acetaminophen 5-325 mg per tablet 1 tablet    lactated ringers infusion    lactated ringers infusion    lisinopriL tablet 2.5 mg    melatonin tablet 6 mg    morphine injection 2 mg    mupirocin 2 % ointment    naloxone 0.4 mg/mL injection 0.02 mg    nicotine 21 mg/24 hr 1 patch    ondansetron injection 4 mg    oxyCODONE immediate release tablet 10 mg    promethazine tablet 25 mg    sodium chloride 0.9% flush 10 mL    traMADoL tablet 50 mg     Objective:     Vital Signs (Most Recent):  Temp: 98.5 °F (36.9 °C) (01/24/24 0815)  Pulse: (!) 128 (01/24/24 0815)  Resp: 18 (01/24/24 0751)  BP: 127/73 (01/24/24 0815)  SpO2: (!) 92 % (01/24/24 0815) Vital Signs (24h Range):  Temp:  [98.1 °F (36.7 °C)-98.5 °F (36.9 °C)] 98.5 °F (36.9 °C)  Pulse:  [] 128  Resp:  [18-20] 18  SpO2:  [92 %-97 %] 92  "%  BP: ()/(58-75) 127/73     Weight: 48.5 kg (107 lb)  Height: 5' 3" (160 cm)  Body mass index is 18.95 kg/m².      Intake/Output Summary (Last 24 hours) at 1/24/2024 1101  Last data filed at 1/24/2024 0924  Gross per 24 hour   Intake 2164.12 ml   Output 2450 ml   Net -285.88 ml        General    Vitals reviewed.  Constitutional: She is oriented to person, place, and time. She appears well-developed and well-nourished.   HENT:   Head: Normocephalic and atraumatic.   Eyes: EOM are normal. Pupils are equal, round, and reactive to light.   Neck: Neck supple.   Cardiovascular:  Normal rate.            Pulmonary/Chest: Effort normal. No respiratory distress.   Abdominal: Soft. She exhibits no distension. There is no abdominal tenderness.   Neurological: She is alert and oriented to person, place, and time.   Psychiatric: She has a normal mood and affect. Her behavior is normal.             Right Hip Exam   Right hip exam is normal.   Left Hip Exam     Other   Sensation: normal    Comments:  Aquacel dressing to the posterolateral hip is clean dry and intact.  There is no excessive swelling or erythema noted about the incision area.  Patient is neurovascular intact to the distal extremity.  There is moderate discomfort with range of motion of the hip to be expected following surgery.  Polar Care is in place          Vascular Exam       Left Pulses  Dorsalis Pedis:      2+          Capillary Refill  Left Hip: brisk         Significant Labs:   Recent Lab Results         01/24/24  0414        Anion Gap 8.0       BUN 3.0       BUN/CREAT RATIO 6       Calcium 8.4       Chloride 100       CO2 28       Creatinine 0.52       eGFR >60       Glucose 83       Hematocrit 31.5       Hemoglobin 10.2       Potassium 3.5       Sodium 136             All pertinent labs within the past 24 hours have been reviewed.    Significant Imaging: None  "

## 2024-01-24 NOTE — PT/OT/SLP PROGRESS
"Physical Therapy Treatment    Patient Name:  Antonina Jaimes   MRN:  [id number]    Recommendations:     Discharge Recommendations: High Intensity Therapy  Discharge Equipment Recommendations: walker, rolling BSC TTB  Barriers to discharge: Decreased caregiver support    Assessment:     Antonina Jaimes is a 63 y.o. female admitted with a medical diagnosis of Closed fracture of left hip.  She presents with the following impairments/functional limitations: decreased lower extremity function, pain .    Rehab Prognosis: Good; patient would benefit from acute skilled PT services to address these deficits and reach maximum level of function.    Recent Surgery: Procedure(s) (LRB):  ARTHROPLASTY, HIP REPLACEMENT (Left) 2 Days Post-Op    Plan:     During this hospitalization, patient to be seen 6 x/week to address the identified rehab impairments via gait training, therapeutic activities, therapeutic exercises and progress toward the following goals:    Plan of Care Expires:       Subjective     Chief Complaint: "I am too weak to go home" Pt requests further rehab.  Patient/Family Comments/goals: to get strong enough to walk without physical assistance  Pain/Comfort:         Objective:     Communicated with pt and caregiver prior to session.  Patient found HOB elevated with   upon PT entry to room.     General Precautions: Standard,    Orthopedic Precautions: LLE partial weight bearing  Braces:    Respiratory Status: Room air     Functional Mobility:  Supine to sit [place]  Sit to stand [place] RW PWB LLE  Gait: amb 30ft RW PWB LLE [place] with req physical steady assist to prevent a fall        AM-PAC 6 CLICK MOBILITY          Treatment & Education:  Tf to chair Hamlet cues hand and foot placement.  Hip protocol ex instructed. Pt gait distance short this morning due to she reports she just recently rec. Pain meds and they are not effective yet, will plan to walk further later when chay. Improves  PT updated goals due to goals on eval " were met.  Pt cont to make progress.  Patient left up in chair with alarm on, cargiver present with all lines intact and call button in reach..    GOALS:   Multidisciplinary Problems       Physical Therapy Goals          Problem: Physical Therapy    Goal Priority Disciplines Outcome Goal Variances Interventions   Physical Therapy Goal     PT, PT/OT Ongoing, Progressing     Description: 1.  Pt will improve bed mob to Jacobo  2. Pt will tf to chair Jacobo AAD  3.  Pt will amb 150ft AAD PwB LLE SBA  4.  Pt will be educated fall prevention and DME recommendations/training                       Time Tracking:     PT Received On: 01/24/24  PT Start Time: 0815     PT Stop Time: 0845  PT Total Time (min): 30 min     Billable Minutes: Gait Training 15 and Therapeutic Exercise 0  Therapeutic Activity 15    Treatment Type: Treatment  PT/PTA: PT           01/24/2024

## 2024-01-25 VITALS
SYSTOLIC BLOOD PRESSURE: 131 MMHG | RESPIRATION RATE: 18 BRPM | DIASTOLIC BLOOD PRESSURE: 69 MMHG | WEIGHT: 107 LBS | HEART RATE: 89 BPM | OXYGEN SATURATION: 95 % | BODY MASS INDEX: 18.96 KG/M2 | HEIGHT: 63 IN | TEMPERATURE: 98 F

## 2024-01-25 LAB
ANION GAP SERPL CALC-SCNC: 5 MEQ/L
BUN SERPL-MCNC: 3 MG/DL (ref 9.8–20.1)
CALCIUM SERPL-MCNC: 8.7 MG/DL (ref 8.4–10.2)
CHLORIDE SERPL-SCNC: 97 MMOL/L (ref 98–107)
CO2 SERPL-SCNC: 32 MMOL/L (ref 23–31)
CREAT SERPL-MCNC: 0.6 MG/DL (ref 0.55–1.02)
CREAT/UREA NIT SERPL: 5
GFR SERPLBLD CREATININE-BSD FMLA CKD-EPI: >60 MLS/MIN/1.73/M2
GLUCOSE SERPL-MCNC: 99 MG/DL (ref 82–115)
HCT VFR BLD AUTO: 31.3 % (ref 37–47)
HGB BLD-MCNC: 10 G/DL (ref 12–16)
POTASSIUM SERPL-SCNC: 3.6 MMOL/L (ref 3.5–5.1)
SODIUM SERPL-SCNC: 134 MMOL/L (ref 136–145)

## 2024-01-25 PROCEDURE — 97110 THERAPEUTIC EXERCISES: CPT

## 2024-01-25 PROCEDURE — 25000003 PHARM REV CODE 250: Performed by: EMERGENCY MEDICINE

## 2024-01-25 PROCEDURE — 80048 BASIC METABOLIC PNL TOTAL CA: CPT | Performed by: PHYSICIAN ASSISTANT

## 2024-01-25 PROCEDURE — 97530 THERAPEUTIC ACTIVITIES: CPT

## 2024-01-25 PROCEDURE — 25000003 PHARM REV CODE 250: Performed by: PHYSICIAN ASSISTANT

## 2024-01-25 PROCEDURE — S4991 NICOTINE PATCH NONLEGEND: HCPCS | Performed by: EMERGENCY MEDICINE

## 2024-01-25 PROCEDURE — 94761 N-INVAS EAR/PLS OXIMETRY MLT: CPT

## 2024-01-25 PROCEDURE — 27000221 HC OXYGEN, UP TO 24 HOURS

## 2024-01-25 PROCEDURE — 63600175 PHARM REV CODE 636 W HCPCS: Performed by: PHYSICIAN ASSISTANT

## 2024-01-25 PROCEDURE — 94799 UNLISTED PULMONARY SVC/PX: CPT | Mod: XB

## 2024-01-25 PROCEDURE — 94640 AIRWAY INHALATION TREATMENT: CPT

## 2024-01-25 PROCEDURE — 85018 HEMOGLOBIN: CPT | Performed by: PHYSICIAN ASSISTANT

## 2024-01-25 PROCEDURE — 25000242 PHARM REV CODE 250 ALT 637 W/ HCPCS: Performed by: EMERGENCY MEDICINE

## 2024-01-25 PROCEDURE — 97116 GAIT TRAINING THERAPY: CPT

## 2024-01-25 RX ORDER — OXYCODONE AND ACETAMINOPHEN 10; 325 MG/1; MG/1
1 TABLET ORAL EVERY 6 HOURS PRN
Qty: 20 TABLET | Refills: 0 | Status: SHIPPED | OUTPATIENT
Start: 2024-01-25 | End: 2024-06-18

## 2024-01-25 RX ORDER — NAPROXEN SODIUM 220 MG/1
325 TABLET, FILM COATED ORAL 2 TIMES DAILY
Qty: 240 TABLET | Refills: 0 | Status: SHIPPED | OUTPATIENT
Start: 2024-01-25 | End: 2024-06-18

## 2024-01-25 RX ADMIN — DIGOXIN 0.12 MG: 125 TABLET ORAL at 10:01

## 2024-01-25 RX ADMIN — OXYCODONE HYDROCHLORIDE 10 MG: 5 TABLET ORAL at 03:01

## 2024-01-25 RX ADMIN — OXYCODONE HYDROCHLORIDE 10 MG: 5 TABLET ORAL at 10:01

## 2024-01-25 RX ADMIN — HYDROCODONE BITARTRATE AND ACETAMINOPHEN 1 TABLET: 5; 325 TABLET ORAL at 03:01

## 2024-01-25 RX ADMIN — ASPIRIN 81 MG CHEWABLE TABLET 324 MG: 81 TABLET CHEWABLE at 10:01

## 2024-01-25 RX ADMIN — LISINOPRIL 2.5 MG: 2.5 TABLET ORAL at 10:01

## 2024-01-25 RX ADMIN — ONDANSETRON 4 MG: 2 INJECTION INTRAMUSCULAR; INTRAVENOUS at 10:01

## 2024-01-25 RX ADMIN — IPRATROPIUM BROMIDE AND ALBUTEROL SULFATE 3 ML: .5; 3 SOLUTION RESPIRATORY (INHALATION) at 07:01

## 2024-01-25 RX ADMIN — NICOTINE 1 PATCH: 21 PATCH, EXTENDED RELEASE TRANSDERMAL at 09:01

## 2024-01-25 RX ADMIN — ATORVASTATIN CALCIUM 20 MG: 20 TABLET, FILM COATED ORAL at 10:01

## 2024-01-25 RX ADMIN — SODIUM CHLORIDE, POTASSIUM CHLORIDE, SODIUM LACTATE AND CALCIUM CHLORIDE: 600; 310; 30; 20 INJECTION, SOLUTION INTRAVENOUS at 03:01

## 2024-01-25 RX ADMIN — FAMOTIDINE 20 MG: 20 TABLET ORAL at 10:01

## 2024-01-25 NOTE — PROGRESS NOTES
[facility] - [facility] Surgical Unit  Orthopedics  Progress Note    Patient Name: Antonina Jaimes  MRN: [id number]  Admission Date: 1/21/2024  Hospital Length of Stay: 4 days  Attending Provider: Williams Caceres MD  Primary Care Provider: Donis Mcneil MD  Follow-up For: Procedure(s) (LRB):  ARTHROPLASTY, HIP REPLACEMENT (Left)    Post-Operative Day: 3 Days Post-Op  Subjective:     Principal Problem:Closed fracture of left hip    Principal Orthopedic Problem:  Left femoral neck fracture    Interval History:  Patient is postop day 3. From a left hip endoprosthesis.  We were trying to get her into [facility] however her insurance has denied her.  She has done great with physical therapy, walking a great distance.  She is partial weight-bearing with a walker.  She will be able to be discharged to her daughter's home.  Her insurance also denied home health.  We have encouraged the patient to continue exercises that she was doing here with physical therapy.  We will assess order 2 week appointment to see if she needs to go to outpatient PT. she declines at this time saying that she will not have a ride.    Review of patient's allergies indicates:  No Known Allergies    Current Facility-Administered Medications   Medication    albuterol-ipratropium 2.5 mg-0.5 mg/3 mL nebulizer solution 3 mL    aspirin chewable tablet 324 mg    atorvastatin tablet 20 mg    bisacodyL suppository 10 mg    dextrose 10% bolus 125 mL 125 mL    dextrose 10% bolus 250 mL 250 mL    digoxin tablet 0.125 mg    docusate sodium capsule 100 mg    famotidine tablet 20 mg    glucagon (human recombinant) injection 1 mg    glucose chewable tablet 16 g    glucose chewable tablet 24 g    HYDROcodone-acetaminophen 5-325 mg per tablet 1 tablet    lactated ringers infusion    lactated ringers infusion    lisinopriL tablet 2.5 mg    melatonin tablet 6 mg    morphine injection 2 mg    mupirocin 2 % ointment    naloxone 0.4 mg/mL injection  "0.02 mg    nicotine 21 mg/24 hr 1 patch    ondansetron injection 4 mg    oxyCODONE immediate release tablet 10 mg    promethazine tablet 25 mg    sodium chloride 0.9% flush 10 mL    traMADoL tablet 50 mg     Objective:     Vital Signs (Most Recent):  Temp: 98.2 °F (36.8 °C) (01/25/24 0802)  Pulse: 88 (01/25/24 0802)  Resp: 18 (01/25/24 1000)  BP: (!) 149/78 (01/25/24 0802)  SpO2: 95 % (01/25/24 0802) Vital Signs (24h Range):  Temp:  [98 °F (36.7 °C)-99.4 °F (37.4 °C)] 98.2 °F (36.8 °C)  Pulse:  [] 88  Resp:  [18-20] 18  SpO2:  [94 %-97 %] 95 %  BP: (122-149)/(69-83) 149/78     Weight: 48.5 kg (107 lb)  Height: 5' 3" (160 cm)  Body mass index is 18.95 kg/m².      Intake/Output Summary (Last 24 hours) at 1/25/2024 1018  Last data filed at 1/25/2024 0523  Gross per 24 hour   Intake 480 ml   Output 700 ml   Net -220 ml        General    Vitals reviewed.  Constitutional: She is oriented to person, place, and time. She appears well-developed and well-nourished.   HENT:   Head: Normocephalic and atraumatic.   Eyes: EOM are normal. Pupils are equal, round, and reactive to light.   Neck: Neck supple.   Cardiovascular:  Normal rate.            Pulmonary/Chest: Effort normal. No respiratory distress.   Abdominal: Soft. She exhibits no distension. There is no abdominal tenderness.   Neurological: She is alert and oriented to person, place, and time.   Psychiatric: She has a normal mood and affect. Her behavior is normal.             Right Hip Exam   Right hip exam is normal.   Left Hip Exam     Other   Sensation: normal    Comments:  Aquacel dressing to the posterolateral hip is clean dry and intact.  There is no excessive swelling or erythema noted about the incision area.  Patient is neurovascular intact to the distal extremity.  There is moderate discomfort with range of motion of the hip to be expected following surgery.          Vascular Exam       Left Pulses  Dorsalis Pedis:      2+          Capillary Refill  Left " Hip: brisk         Significant Labs:   Recent Lab Results         01/25/24  0356        Anion Gap 5.0       BUN 3.0       BUN/CREAT RATIO 5       Calcium 8.7       Chloride 97       CO2 32       Creatinine 0.60       eGFR >60       Glucose 99       Hematocrit 31.3       Hemoglobin 10.0       Potassium 3.6       Sodium 134             All pertinent labs within the past 24 hours have been reviewed.    Significant Imaging: None  Assessment/Plan:     * Closed fracture of left hip  Postop day 3, left hip endoprosthesis secondary to Closed left femoral neck fracture.    She will be discharged today by hospital medicine.  Recommend Percocet 10/325 mg q.6 hours p.r.n. for pain, Toradol 10 mg q.8 hours p.r.n. for pain and inflammation and Duricef 500 mg b.i.d. for 10 days.  Recommend she continue aspirin 325 mg b.i.d. and wear bilateral Ronaldo hose for 6 weeks for DVT prophylaxis.    Follow up with Dr. Diaz in 2 weeks.    Patient may keep Aquacel dressing in place and dry until follow-up appointment.          FOREIGN Rich  Orthopedics  [facility]

## 2024-01-25 NOTE — DISCHARGE SUMMARY
[facility]  Hospital Medicine  Discharge summary    Patient Name: Antonina Jaimes  MRN: [id number]  Patient Class: IP- Inpatient   Admission Date: 1/21/2024  Length of Stay: 4 days  Attending Physician: Williams Caceres MD  Primary Care Provider: Donis Mcneil MD        Subjective:     Principal Problem:Closed fracture of left hip    Interval History:   HPI:   Ms. Jaimes is a 63-year-old female who presented initially to the ED and [place], [place] following a ground level fall at home and subsequent left hip pain.  The injury occurred about 1 hour prior to her presentation.  Evaluation in the ED there revealed a subcapital left femoral neck fracture.  She was transferred here for orthopedic evaluation and management.  She was also noted to be intoxicated and had an ETOH level greater than 300.  In addition to this reportedly also received IV Ativan and morphine in the ED.  On seeing her following her arrival to the med surge unit, she is comfortable in bed, no other complaints, appears intoxicated.     1/22-there were no events overnight; when seen on rounds this morning was doing well, pain controlled; normal mental status; she was preparing to go to the OR for ORIF    January 23, 2024-patient got ORIF surgery yesterday, no complaints, no fever, no shortness for breath, waiting for rehab placement    January 24, 2024-the patient was sitting at the chair watching TV, no fever, no shortness for breath, no nausea    January 25, 2024-patient is doing well, no fever, no cough, no shortness for breath     Patient can go home today and have a follow-up with orthopedic surgeon     I gave the patient Percocet prescription electronically  Objective:     Vital Signs (Most Recent):  Temp: 98.2 °F (36.8 °C) (01/25/24 0802)  Pulse: 88 (01/25/24 0802)  Resp: 18 (01/25/24 1000)  BP: (!) 149/78 (01/25/24 0802)  SpO2: 95 % (01/25/24 0802) Vital Signs (24h Range):  Temp:  [98 °F  "(36.7 °C)-99.4 °F (37.4 °C)] 98.2 °F (36.8 °C)  Pulse:  [] 88  Resp:  [18-20] 18  SpO2:  [94 %-97 %] 95 %  BP: (122-149)/(69-83) 149/78     Weight: 48.5 kg (107 lb)  Body mass index is 18.95 kg/m².    Intake/Output Summary (Last 24 hours) at 1/25/2024 1049  Last data filed at 1/25/2024 0523  Gross per 24 hour   Intake 480 ml   Output 700 ml   Net -220 ml        Physical exam  Constitution-well nourished, normally developed female in NAD  HENT-normocephalic, atraumatic  Neck-supple  Respiratory-normal respirations  Heart-RRR  Abdomen-soft, nontender, nondistended; active bowel sounds  Genitourinary-deferred  Musculoskeletal-no joint abnormalities, normal ROM throughout with exception of left hip  Skin-warm, dry; no rashes  Neurologic-alert and oriented x3; nonfocal exam    Scheduled Meds:   albuterol-ipratropium  3 mL Nebulization Q6H WAKE    aspirin  324 mg Oral BID    atorvastatin  20 mg Oral Daily    digoxin  0.125 mg Oral Daily    docusate sodium  100 mg Oral Q12H    famotidine  20 mg Oral BID    lisinopriL  2.5 mg Oral Daily    mupirocin   Nasal BID    nicotine  1 patch Transdermal Daily     Continuous Infusions:   lactated ringers 100 mL/hr at 01/24/24 0433    lactated ringers 75 mL/hr at 01/25/24 0340     PRN Meds:.bisacodyL, dextrose 10%, dextrose 10%, glucagon (human recombinant), glucose, glucose, HYDROcodone-acetaminophen, melatonin, morphine, naloxone, ondansetron, oxyCODONE, promethazine, sodium chloride 0.9%, traMADoL    Significant Labs: All pertinent labs within the past 24 hours have been reviewed.  CBC:   Recent Labs   Lab 01/24/24 0414 01/25/24  0356   HGB 10.2* 10.0*   HCT 31.5* 31.3*       CMP:   Recent Labs   Lab 01/24/24 0414 01/25/24  0356    134*   K 3.5 3.6   CO2 28 32*   BUN 3.0* 3.0*   CREATININE 0.52* 0.60   CALCIUM 8.4 8.7       Magnesium:   No results for input(s): "MG" in the last 48 hours.      Significant Imaging: I have reviewed all pertinent imaging results/findings " within the past 24 hours.  XR pelvis, left hip  Impression:  Minimally displaced subcapital left femoral neck fracture.    Procedures  left  hip unipolar arthroplasty     Implant type:  [place] porous-coated stem size 4 std standard, unipolar head 43  mm diameter     Surgeon: Oleg Diaz MD    Assessment/Plan:   Closed, left femoral neck fracture  ETOH intoxication   Chronic COPD   Tobacco abuse   Hypertension  S/p ORIF per Dr. Diaz, 1/22  PT/OT    Discharge condition stable     Discharge destination-home-discharge time 30 minutes    Active Diagnoses:    Diagnosis Date Noted POA    PRINCIPAL PROBLEM:  Closed fracture of left hip [S72.002A] 01/21/2024 Yes      Problems Resolved During this Admission:     VTE Risk Mitigation (From admission, onward)           Ordered     IP VTE LOW RISK PATIENT  Once         01/22/24 1144     Place ANDREW hose  Until discontinued         01/22/24 1144     Place sequential compression device  Until discontinued         01/22/24 1144     Place sequential compression device  Until discontinued         01/21/24 1256                       Luisa Shelley MD  Department of Hospital Medicine   [facility]

## 2024-01-25 NOTE — SUBJECTIVE & OBJECTIVE
Principal Problem:Closed fracture of left hip    Principal Orthopedic Problem:  Left femoral neck fracture    Interval History:  Patient is postop day 3. From a left hip endoprosthesis.  We were trying to get her into [facility] however her insurance has denied her.  She has done great with physical therapy, walking a great distance.  She is partial weight-bearing with a walker.  She will be able to be discharged to her daughter's home.  Her insurance also denied home health.  We have encouraged the patient to continue exercises that she was doing here with physical therapy.  We will assess order 2 week appointment to see if she needs to go to outpatient PT. she declines at this time saying that she will not have a ride.    Review of patient's allergies indicates:  No Known Allergies    Current Facility-Administered Medications   Medication    albuterol-ipratropium 2.5 mg-0.5 mg/3 mL nebulizer solution 3 mL    aspirin chewable tablet 324 mg    atorvastatin tablet 20 mg    bisacodyL suppository 10 mg    dextrose 10% bolus 125 mL 125 mL    dextrose 10% bolus 250 mL 250 mL    digoxin tablet 0.125 mg    docusate sodium capsule 100 mg    famotidine tablet 20 mg    glucagon (human recombinant) injection 1 mg    glucose chewable tablet 16 g    glucose chewable tablet 24 g    HYDROcodone-acetaminophen 5-325 mg per tablet 1 tablet    lactated ringers infusion    lactated ringers infusion    lisinopriL tablet 2.5 mg    melatonin tablet 6 mg    morphine injection 2 mg    mupirocin 2 % ointment    naloxone 0.4 mg/mL injection 0.02 mg    nicotine 21 mg/24 hr 1 patch    ondansetron injection 4 mg    oxyCODONE immediate release tablet 10 mg    promethazine tablet 25 mg    sodium chloride 0.9% flush 10 mL    traMADoL tablet 50 mg     Objective:     Vital Signs (Most Recent):  Temp: 98.2 °F (36.8 °C) (01/25/24 0802)  Pulse: 88 (01/25/24 0802)  Resp: 18 (01/25/24 1000)  BP: (!) 149/78 (01/25/24 0802)  SpO2: 95 % (01/25/24 0802)  "Vital Signs (24h Range):  Temp:  [98 °F (36.7 °C)-99.4 °F (37.4 °C)] 98.2 °F (36.8 °C)  Pulse:  [] 88  Resp:  [18-20] 18  SpO2:  [94 %-97 %] 95 %  BP: (122-149)/(69-83) 149/78     Weight: 48.5 kg (107 lb)  Height: 5' 3" (160 cm)  Body mass index is 18.95 kg/m².      Intake/Output Summary (Last 24 hours) at 1/25/2024 1018  Last data filed at 1/25/2024 0523  Gross per 24 hour   Intake 480 ml   Output 700 ml   Net -220 ml        General    Vitals reviewed.  Constitutional: She is oriented to person, place, and time. She appears well-developed and well-nourished.   HENT:   Head: Normocephalic and atraumatic.   Eyes: EOM are normal. Pupils are equal, round, and reactive to light.   Neck: Neck supple.   Cardiovascular:  Normal rate.            Pulmonary/Chest: Effort normal. No respiratory distress.   Abdominal: Soft. She exhibits no distension. There is no abdominal tenderness.   Neurological: She is alert and oriented to person, place, and time.   Psychiatric: She has a normal mood and affect. Her behavior is normal.             Right Hip Exam   Right hip exam is normal.   Left Hip Exam     Other   Sensation: normal    Comments:  Aquacel dressing to the posterolateral hip is clean dry and intact.  There is no excessive swelling or erythema noted about the incision area.  Patient is neurovascular intact to the distal extremity.  There is moderate discomfort with range of motion of the hip to be expected following surgery.          Vascular Exam       Left Pulses  Dorsalis Pedis:      2+          Capillary Refill  Left Hip: brisk         Significant Labs:   Recent Lab Results         01/25/24  0356        Anion Gap 5.0       BUN 3.0       BUN/CREAT RATIO 5       Calcium 8.7       Chloride 97       CO2 32       Creatinine 0.60       eGFR >60       Glucose 99       Hematocrit 31.3       Hemoglobin 10.0       Potassium 3.6       Sodium 134             All pertinent labs within the past 24 hours have been " reviewed.    Significant Imaging: None

## 2024-01-25 NOTE — PT/OT/SLP PROGRESS
"Physical Therapy Treatment    Patient Name:  Antonina Jaimes   MRN:  [id number]    Recommendations:     Discharge Recommendations: High Intensity Therapy  Discharge Equipment Recommendations: walker, rolling BSC TTB  Barriers to discharge: Decreased caregiver support    Assessment:     Antonina Jaimes is a 63 y.o. female admitted with a medical diagnosis of Closed fracture of left hip.  She presents with the following impairments/functional limitations: decreased lower extremity function, pain .    Rehab Prognosis: Good; patient would benefit from acute skilled PT services to address these deficits and reach maximum level of function.    Recent Surgery: Procedure(s) (LRB):  ARTHROPLASTY, HIP REPLACEMENT (Left) 3 Days Post-Op    Plan:     During this hospitalization, patient to be seen 6 x/week to address the identified rehab impairments via gait training, therapeutic activities, therapeutic exercises and progress toward the following goals:    Plan of Care Expires:       Subjective     Chief Complaint: "I am too weak to go home" Pt requests further rehab.  Patient/Family Comments/goals: to get strong enough to walk without physical assistance  Pain/Comfort:         Objective:     Communicated with pt and caregiver prior to session.  Patient found up in chair with   upon PT entry to room.     General Precautions: Standard,    Orthopedic Precautions: LLE partial weight bearing  Braces:    Respiratory Status: Room air     Functional Mobility:  Bed Mobility:     Supine to Sit: moderate assistance  Transfers:     Sit to Stand:  moderate assistance with rolling walker  Gait: amb 62ft, 50ftx3 RW PwB [place] with physical steady assist to prevent a fall  Pt tf on and off commode with rails and modA  Pt amb to bed [place] RW, Pt tf to bed [place]      AM-PAC 6 CLICK MOBILITY          Treatment & Education:  Hip protocol ex instructed    Patient left with head of bed up with bed alarm on, all lines intact and call button in " reach..    GOALS:   Multidisciplinary Problems       Physical Therapy Goals          Problem: Physical Therapy    Goal Priority Disciplines Outcome Goal Variances Interventions   Physical Therapy Goal     PT, PT/OT Ongoing, Progressing     Description: 1.  Pt will improve bed mob to Jacobo  2. Pt will tf to chair Jacobo AAD  3.  Pt will amb 150ft AAD PwB LLE SBA  4.  Pt will be educated fall prevention and DME recommendations/training                       Time Tracking:     PT Received On: 01/25/24  PT Start Time: 1230     PT Stop Time: 1245  PT Total Time (min): 15 min     Billable Minutes: Gait Training 15 and Therapeutic Exercise 0  Therapeutic Activity 0    Treatment Type: Treatment  PT/PTA: PT           01/25/2024

## 2024-01-25 NOTE — PT/OT/SLP PROGRESS
"Physical Therapy Treatment    Patient Name:  Antonina Jaimes   MRN:  [id number]    Recommendations:     Discharge Recommendations: High Intensity Therapy  Discharge Equipment Recommendations: walker, rolling BSC TTB  Barriers to discharge: Decreased caregiver support    Assessment:     Antonina Jaimes is a 63 y.o. female admitted with a medical diagnosis of Closed fracture of left hip.  She presents with the following impairments/functional limitations: decreased lower extremity function, pain .    Rehab Prognosis: Good; patient would benefit from acute skilled PT services to address these deficits and reach maximum level of function.    Recent Surgery: Procedure(s) (LRB):  ARTHROPLASTY, HIP REPLACEMENT (Left) 3 Days Post-Op    Plan:     During this hospitalization, patient to be seen 6 x/week to address the identified rehab impairments via gait training, therapeutic activities, therapeutic exercises and progress toward the following goals:    Plan of Care Expires:       Subjective     Chief Complaint: "I am too weak to go home" Pt requests further rehab.  Patient/Family Comments/goals: to get strong enough to walk without physical assistance  Pain/Comfort:         Objective:     Communicated with pt and caregiver prior to session.  Patient found up in chair with   upon PT entry to room.     General Precautions: Standard,    Orthopedic Precautions: LLE partial weight bearing  Braces:    Respiratory Status: Room air     Functional Mobility:  Bed Mobility:     Supine to Sit: moderate assistance  Transfers:     Sit to Stand:  moderate assistance with rolling walker  Gait: amb 62ft, 50ftx3 RW PwB [place] with physical steady assist to prevent a fall  Pt tf on and off BSC SBA  Pt tf to chair SBA      AM-PAC 6 CLICK MOBILITY          Treatment & Education:  Hip protocol ex instructed    Patient left up in chair with chair alarm on, cargiver present with all lines intact and call button in reach..    GOALS: "   Multidisciplinary Problems       Physical Therapy Goals          Problem: Physical Therapy    Goal Priority Disciplines Outcome Goal Variances Interventions   Physical Therapy Goal     PT, PT/OT Ongoing, Progressing     Description: 1.  Pt will improve bed mob to Jacobo  2. Pt will tf to chair [place] AAD  3.  Pt will amb 150ft AAD PwB LLE SBA  4.  Pt will be educated fall prevention and DME recommendations/training                       Time Tracking:     PT Received On: 01/25/24  PT Start Time: 1000     PT Stop Time: 1045  PT Total Time (min): 45 min     Billable Minutes: Gait Training 15 and Therapeutic Exercise 15  Therapeutic Activity 15    Treatment Type: Treatment  PT/PTA: PT           01/25/2024

## 2024-01-25 NOTE — PLAN OF CARE
Pt going to daughter's home at [address], [place] [address].  Daughter number is [phone].

## 2024-01-25 NOTE — PLAN OF CARE
01/25/24 1249   Final Note   Assessment Type Final Discharge Note   Anticipated Discharge Disposition Home   Post-Acute Status   Discharge Delays None known at this time     Pt will d/c home to daughter's address today. [address] [place] [address].    No P2P will be done.  She is doing great w/ therapy.  She has a w/c at home.  Pt will d/c per [facility] since daughter is going home to arrange bed and a lift recliner to the home and she is unable to return to pick her up.  Will bill transport to hospital contract.  Placed pt in will-call w/ [facility] and informed nurse.

## 2024-01-25 NOTE — ASSESSMENT & PLAN NOTE
Postop day 3, left hip endoprosthesis secondary to Closed left femoral neck fracture.    She will be discharged today by hospital medicine.  Recommend Percocet 10/325 mg q.6 hours p.r.n. for pain, Toradol 10 mg q.8 hours p.r.n. for pain and inflammation and Duricef 500 mg b.i.d. for 10 days.  Recommend she continue aspirin 325 mg b.i.d. and wear bilateral Ronaldo hose for 6 weeks for DVT prophylaxis.    Follow up with Dr. Diaz in 2 weeks.    Patient may keep Aquacel dressing in place and dry until follow-up appointment.

## 2024-01-28 ENCOUNTER — PATIENT MESSAGE (OUTPATIENT)
Dept: ADMINISTRATIVE | Facility: OTHER | Age: 64
End: 2024-01-28
Payer: MEDICAID

## 2024-01-29 ENCOUNTER — TELEPHONE (OUTPATIENT)
Dept: ADMINISTRATIVE | Facility: CLINIC | Age: 64
End: 2024-01-29
Payer: MEDICAID

## 2024-02-01 ENCOUNTER — OFFICE VISIT (OUTPATIENT)
Dept: FAMILY MEDICINE | Facility: CLINIC | Age: 64
End: 2024-02-01
Payer: MEDICAID

## 2024-02-01 VITALS — WEIGHT: 114 LBS | TEMPERATURE: 101 F | BODY MASS INDEX: 20.19 KG/M2

## 2024-02-01 DIAGNOSIS — Z20.828 EXPOSURE TO THE FLU: Primary | ICD-10-CM

## 2024-02-01 DIAGNOSIS — K59.00 CONSTIPATION, UNSPECIFIED CONSTIPATION TYPE: ICD-10-CM

## 2024-02-01 PROCEDURE — 99214 OFFICE O/P EST MOD 30 MIN: CPT | Mod: 95,,, | Performed by: FAMILY MEDICINE

## 2024-02-01 PROCEDURE — 3008F BODY MASS INDEX DOCD: CPT | Mod: CPTII,95,, | Performed by: FAMILY MEDICINE

## 2024-02-01 PROCEDURE — 1111F DSCHRG MED/CURRENT MED MERGE: CPT | Mod: CPTII,95,, | Performed by: FAMILY MEDICINE

## 2024-02-01 PROCEDURE — 1160F RVW MEDS BY RX/DR IN RCRD: CPT | Mod: CPTII,95,, | Performed by: FAMILY MEDICINE

## 2024-02-01 PROCEDURE — 4010F ACE/ARB THERAPY RXD/TAKEN: CPT | Mod: CPTII,95,, | Performed by: FAMILY MEDICINE

## 2024-02-01 PROCEDURE — 1159F MED LIST DOCD IN RCRD: CPT | Mod: CPTII,95,, | Performed by: FAMILY MEDICINE

## 2024-02-01 RX ORDER — OSELTAMIVIR PHOSPHATE 75 MG/1
75 CAPSULE ORAL 2 TIMES DAILY
Qty: 10 CAPSULE | Refills: 0 | Status: SHIPPED | OUTPATIENT
Start: 2024-02-01 | End: 2024-02-06

## 2024-02-01 RX ORDER — ONDANSETRON 4 MG/1
4 TABLET, ORALLY DISINTEGRATING ORAL 2 TIMES DAILY
Qty: 10 TABLET | Refills: 0 | Status: SHIPPED | OUTPATIENT
Start: 2024-02-01 | End: 2024-02-06

## 2024-02-01 RX ORDER — IPRATROPIUM BROMIDE AND ALBUTEROL SULFATE 2.5; .5 MG/3ML; MG/3ML
3 SOLUTION RESPIRATORY (INHALATION) EVERY 6 HOURS PRN
Qty: 75 ML | Refills: 1 | Status: SHIPPED | OUTPATIENT
Start: 2024-02-01 | End: 2024-02-23 | Stop reason: SDUPTHER

## 2024-02-01 NOTE — PROGRESS NOTES
Subjective:       Patient ID: Antonina Jaimes is a 63 y.o. female.    Chief Complaint: Flu exposure      Flu exposure    Fever   This is a new problem. The current episode started yesterday. The problem occurs intermittently. The problem has been gradually worsening. The maximum temperature noted was 99 to 99.9 F. The temperature was taken using an oral thermometer. Associated symptoms include congestion, coughing, headaches, muscle aches, nausea, sleepiness and a sore throat. Pertinent negatives include no abdominal pain, chest pain, diarrhea, ear pain, rash, urinary pain, vomiting or wheezing. She has tried acetaminophen for the symptoms. The treatment provided no relief.       Review of Systems   Constitutional:  Positive for chills and fever.   HENT:  Positive for nasal congestion and sore throat. Negative for ear pain.    Respiratory:  Positive for cough. Negative for wheezing.    Cardiovascular:  Negative for chest pain.   Gastrointestinal:  Positive for constipation and nausea. Negative for abdominal pain, diarrhea and vomiting.   Genitourinary:  Negative for dysuria.   Musculoskeletal:         Recent hip surgery with Dr Diaz   Integumentary:  Negative for rash.   Neurological:  Positive for headaches.           Objective:      Temp (!) 100.7 °F (38.2 °C)   Wt 51.7 kg (114 lb)   BMI 20.19 kg/m²    Physical Exam  General:  Alert oriented  Neurologic:  Alert, oriented  Psychiatric:  Cooperative, appropriate mood and affect, normal judgment      The patient location is: [place]  The chief complaint leading to consultation is: flu exposure, constipation    Visit type: audiovisual    Face to Face time with patient: 15 minutes of total time spent on the encounter, which includes face to face time and non-face to face time preparing to see the patient (eg, review of tests), Obtaining and/or reviewing separately obtained history, Documenting clinical information in the electronic or other health record,  Independently interpreting results (not separately reported) and communicating results to the patient/family/caregiver, or Care coordination (not separately reported).         Each patient to whom he or she provides medical services by telemedicine is:  (1) informed of the relationship between the physician and patient and the respective role of any other health care provider with respect to management of the patient; and (2) notified that he or she may decline to receive medical services by telemedicine and may withdraw from such care at any time.          Assessment:       Problem List Items Addressed This Visit    None  Visit Diagnoses       Exposure to the flu    -  Primary    Relevant Medications    albuterol-ipratropium (DUO-NEB) 2.5 mg-0.5 mg/3 mL nebulizer solution    oseltamivir (TAMIFLU) 75 MG capsule    Constipation, unspecified constipation type                   Plan:   1. Exposure to the flu  -     albuterol-ipratropium (DUO-NEB) 2.5 mg-0.5 mg/3 mL nebulizer solution; Take 3 mLs by nebulization every 6 (six) hours as needed for Shortness of Breath.  Dispense: 75 mL; Refill: 1  -     oseltamivir (TAMIFLU) 75 MG capsule; Take 1 capsule (75 mg total) by mouth 2 (two) times daily. for 5 days  Dispense: 10 capsule; Refill: 0  Rx for above medication  OTC medications as needed  Increase fluids  Monitor  Return to clinic with concerns     2. Constipation, unspecified constipation type  OTC Dulcolax

## 2024-02-13 DIAGNOSIS — R07.9 CHEST PAIN, UNSPECIFIED TYPE: ICD-10-CM

## 2024-02-14 RX ORDER — DIGOXIN 125 MCG
TABLET ORAL
Qty: 30 TABLET | Refills: 3 | Status: SHIPPED | OUTPATIENT
Start: 2024-02-14 | End: 2024-06-04

## 2024-02-23 ENCOUNTER — OFFICE VISIT (OUTPATIENT)
Dept: FAMILY MEDICINE | Facility: CLINIC | Age: 64
End: 2024-02-23
Payer: MEDICAID

## 2024-02-23 VITALS — BODY MASS INDEX: 20.37 KG/M2 | WEIGHT: 115 LBS

## 2024-02-23 DIAGNOSIS — Z12.31 SCREENING MAMMOGRAM, ENCOUNTER FOR: ICD-10-CM

## 2024-02-23 DIAGNOSIS — G47.00 INSOMNIA, UNSPECIFIED TYPE: ICD-10-CM

## 2024-02-23 DIAGNOSIS — J44.9 CHRONIC OBSTRUCTIVE PULMONARY DISEASE, UNSPECIFIED COPD TYPE: Primary | ICD-10-CM

## 2024-02-23 PROCEDURE — 1160F RVW MEDS BY RX/DR IN RCRD: CPT | Mod: CPTII,95,, | Performed by: FAMILY MEDICINE

## 2024-02-23 PROCEDURE — 3008F BODY MASS INDEX DOCD: CPT | Mod: CPTII,95,, | Performed by: FAMILY MEDICINE

## 2024-02-23 PROCEDURE — 1159F MED LIST DOCD IN RCRD: CPT | Mod: CPTII,95,, | Performed by: FAMILY MEDICINE

## 2024-02-23 PROCEDURE — 1111F DSCHRG MED/CURRENT MED MERGE: CPT | Mod: CPTII,95,, | Performed by: FAMILY MEDICINE

## 2024-02-23 PROCEDURE — 4010F ACE/ARB THERAPY RXD/TAKEN: CPT | Mod: CPTII,95,, | Performed by: FAMILY MEDICINE

## 2024-02-23 PROCEDURE — 99214 OFFICE O/P EST MOD 30 MIN: CPT | Mod: 95,,, | Performed by: FAMILY MEDICINE

## 2024-02-23 RX ORDER — IPRATROPIUM BROMIDE AND ALBUTEROL SULFATE 2.5; .5 MG/3ML; MG/3ML
3 SOLUTION RESPIRATORY (INHALATION) EVERY 6 HOURS PRN
Qty: 75 ML | Refills: 1 | Status: SHIPPED | OUTPATIENT
Start: 2024-02-23

## 2024-02-23 RX ORDER — ALBUTEROL SULFATE 90 UG/1
2 AEROSOL, METERED RESPIRATORY (INHALATION) EVERY 6 HOURS PRN
Qty: 18 G | Refills: 0 | Status: SHIPPED | OUTPATIENT
Start: 2024-02-23 | End: 2025-02-22

## 2024-02-23 NOTE — PROGRESS NOTES
Subjective:       Patient ID: Antonina Jaimes is a 63 y.o. female.    Chief Complaint: 4 month      Routine        Review of Systems   Constitutional:  Negative for activity change and unexpected weight change.   HENT:  Negative for hearing loss, rhinorrhea and trouble swallowing.    Eyes:  Negative for discharge and visual disturbance.   Respiratory:  Positive for wheezing. Negative for chest tightness.    Cardiovascular:  Negative for chest pain and palpitations.   Gastrointestinal:  Negative for blood in stool, constipation, diarrhea and vomiting.   Endocrine: Negative for polydipsia and polyuria.   Genitourinary:  Negative for difficulty urinating, dysuria, hematuria and menstrual problem.   Musculoskeletal:  Negative for arthralgias, joint swelling and neck pain.   Neurological:  Negative for weakness and headaches.   Psychiatric/Behavioral:  Negative for confusion and dysphoric mood.         Insomnia: tolerating medication, medication working well, patient without any complaints             Objective:      Wt 52.2 kg (115 lb)   BMI 20.37 kg/m²    Physical Exam  General:  Alert oriented, no acute distress  Neurologic:  Alert, oriented  Psychiatric:  Cooperative, appropriate mood and affect, normal judgment      The patient location is: [place]  The chief complaint leading to consultation is: Routine    Visit type: audiovisual    Face to Face time with patient: 15 minutes of total time spent on the encounter, which includes face to face time and non-face to face time preparing to see the patient (eg, review of tests), Obtaining and/or reviewing separately obtained history, Documenting clinical information in the electronic or other health record, Independently interpreting results (not separately reported) and communicating results to the patient/family/caregiver, or Care coordination (not separately reported).         Each patient to whom he or she provides medical services by telemedicine is:  (1)  informed of the relationship between the physician and patient and the respective role of any other health care provider with respect to management of the patient; and (2) notified that he or she may decline to receive medical services by telemedicine and may withdraw from such care at any time.          Assessment:       Problem List Items Addressed This Visit          Pulmonary    Chronic obstructive pulmonary disease - Primary    Relevant Medications    albuterol-ipratropium (DUO-NEB) 2.5 mg-0.5 mg/3 mL nebulizer solution    albuterol (VENTOLIN HFA) 90 mcg/actuation inhaler       Other    Insomnia     Other Visit Diagnoses       Screening mammogram, encounter for        Relevant Orders    Mammo Digital Screening Bilat w/ Fl[facility]               Plan:   1. Chronic obstructive pulmonary disease, unspecified COPD type  -     albuterol-ipratropium (DUO-NEB) 2.5 mg-0.5 mg/3 mL nebulizer solution; Take 3 mLs by nebulization every 6 (six) hours as needed for Shortness of Breath.  Dispense: 75 mL; Refill: 1  -     albuterol (VENTOLIN HFA) 90 mcg/actuation inhaler; Inhale 2 puffs into the lungs every 6 (six) hours as needed for Wheezing. Rescue  Dispense: 18 g; Refill: 0  Controlled  Continue current Rx medication  Return to clinic with any concerns    2. Insomnia, unspecified type  Controlled  Continue current Rx medication  Return to clinic with any concerns    3. Screening mammogram, encounter for  -     Mammo Digital Screening Bilat w/ Florentino; Future; Expected date: 02/23/2024                No

## 2024-03-26 DIAGNOSIS — I10 HYPERTENSION, UNSPECIFIED TYPE: ICD-10-CM

## 2024-03-26 RX ORDER — ENALAPRIL MALEATE 5 MG/1
2.5 TABLET ORAL
Qty: 15 TABLET | Refills: 3 | Status: SHIPPED | OUTPATIENT
Start: 2024-03-26 | End: 2024-06-18 | Stop reason: SDUPTHER

## 2024-04-03 DIAGNOSIS — E78.5 HYPERLIPIDEMIA, UNSPECIFIED HYPERLIPIDEMIA TYPE: ICD-10-CM

## 2024-04-03 RX ORDER — SIMVASTATIN 20 MG/1
TABLET, FILM COATED ORAL
Qty: 30 TABLET | Refills: 3 | Status: SHIPPED | OUTPATIENT
Start: 2024-04-03

## 2024-04-06 DIAGNOSIS — G47.00 INSOMNIA, UNSPECIFIED TYPE: ICD-10-CM

## 2024-04-08 RX ORDER — TRAZODONE HYDROCHLORIDE 50 MG/1
50 TABLET ORAL NIGHTLY
Qty: 30 TABLET | Refills: 3 | Status: SHIPPED | OUTPATIENT
Start: 2024-04-08

## 2024-05-06 DIAGNOSIS — J44.9 CHRONIC OBSTRUCTIVE PULMONARY DISEASE, UNSPECIFIED COPD TYPE: Primary | ICD-10-CM

## 2024-05-06 RX ORDER — ALBUTEROL SULFATE 2.5 MG/.5ML
2.5 SOLUTION RESPIRATORY (INHALATION) EVERY 4 HOURS PRN
Qty: 30 EACH | Refills: 1 | Status: SHIPPED | OUTPATIENT
Start: 2024-05-06 | End: 2025-05-06

## 2024-05-30 DIAGNOSIS — Z00.00 WELLNESS EXAMINATION: ICD-10-CM

## 2024-05-30 DIAGNOSIS — Z11.59 NEED FOR HEPATITIS C SCREENING TEST: ICD-10-CM

## 2024-05-30 DIAGNOSIS — Z13.6 SCREENING FOR ISCHEMIC HEART DISEASE: ICD-10-CM

## 2024-05-30 DIAGNOSIS — Z11.4 ENCOUNTER FOR HIV (HUMAN IMMUNODEFICIENCY VIRUS) TEST: Primary | ICD-10-CM

## 2024-06-04 DIAGNOSIS — R07.9 CHEST PAIN, UNSPECIFIED TYPE: ICD-10-CM

## 2024-06-04 RX ORDER — DIGOXIN 125 MCG
TABLET ORAL
Qty: 30 TABLET | Refills: 3 | Status: SHIPPED | OUTPATIENT
Start: 2024-06-04 | End: 2024-06-05 | Stop reason: SDUPTHER

## 2024-06-05 DIAGNOSIS — R07.9 CHEST PAIN, UNSPECIFIED TYPE: ICD-10-CM

## 2024-06-06 RX ORDER — DIGOXIN 125 MCG
0.12 TABLET ORAL DAILY
Qty: 30 TABLET | Refills: 3 | Status: SHIPPED | OUTPATIENT
Start: 2024-06-06

## 2024-06-11 ENCOUNTER — LAB VISIT (OUTPATIENT)
Dept: LAB | Facility: HOSPITAL | Age: 64
End: 2024-06-11
Attending: FAMILY MEDICINE
Payer: MEDICAID

## 2024-06-11 DIAGNOSIS — Z00.00 WELLNESS EXAMINATION: ICD-10-CM

## 2024-06-11 DIAGNOSIS — Z13.6 SCREENING FOR ISCHEMIC HEART DISEASE: ICD-10-CM

## 2024-06-11 DIAGNOSIS — Z11.59 NEED FOR HEPATITIS C SCREENING TEST: ICD-10-CM

## 2024-06-11 DIAGNOSIS — Z11.4 ENCOUNTER FOR HIV (HUMAN IMMUNODEFICIENCY VIRUS) TEST: ICD-10-CM

## 2024-06-11 DIAGNOSIS — R30.0 DYSURIA: Primary | ICD-10-CM

## 2024-06-11 LAB
ALBUMIN SERPL-MCNC: 4 G/DL (ref 3.4–4.8)
ALBUMIN/GLOB SERPL: 1.5 RATIO (ref 1.1–2)
ALP SERPL-CCNC: 60 UNIT/L (ref 40–150)
ALT SERPL-CCNC: 11 UNIT/L (ref 0–55)
ANION GAP SERPL CALC-SCNC: 8 MEQ/L
AST SERPL-CCNC: 18 UNIT/L (ref 5–34)
BASOPHILS # BLD AUTO: 0.05 X10(3)/MCL
BASOPHILS NFR BLD AUTO: 0.8 %
BILIRUB SERPL-MCNC: 0.5 MG/DL
BUN SERPL-MCNC: 6 MG/DL (ref 9.8–20.1)
CALCIUM SERPL-MCNC: 9.9 MG/DL (ref 8.4–10.2)
CHLORIDE SERPL-SCNC: 103 MMOL/L (ref 98–107)
CHOLEST SERPL-MCNC: 166 MG/DL
CHOLEST/HDLC SERPL: 2 {RATIO} (ref 0–5)
CO2 SERPL-SCNC: 28 MMOL/L (ref 23–31)
CREAT SERPL-MCNC: 0.68 MG/DL (ref 0.55–1.02)
CREAT/UREA NIT SERPL: 9
EOSINOPHIL # BLD AUTO: 0.25 X10(3)/MCL (ref 0–0.9)
EOSINOPHIL NFR BLD AUTO: 4.1 %
ERYTHROCYTE [DISTWIDTH] IN BLOOD BY AUTOMATED COUNT: 13.4 % (ref 11.5–17)
GFR SERPLBLD CREATININE-BSD FMLA CKD-EPI: >60 ML/MIN/1.73/M2
GLOBULIN SER-MCNC: 2.7 GM/DL (ref 2.4–3.5)
GLUCOSE SERPL-MCNC: 88 MG/DL (ref 82–115)
HCT VFR BLD AUTO: 40.4 % (ref 37–47)
HCV AB SERPL QL IA: NONREACTIVE
HDLC SERPL-MCNC: 79 MG/DL (ref 35–60)
HGB BLD-MCNC: 13.2 G/DL (ref 12–16)
HIV 1+2 AB+HIV1 P24 AG SERPL QL IA: NONREACTIVE
IMM GRANULOCYTES # BLD AUTO: 0.01 X10(3)/MCL (ref 0–0.04)
IMM GRANULOCYTES NFR BLD AUTO: 0.2 %
LDLC SERPL CALC-MCNC: 75 MG/DL (ref 50–140)
LYMPHOCYTES # BLD AUTO: 2.13 X10(3)/MCL (ref 0.6–4.6)
LYMPHOCYTES NFR BLD AUTO: 35 %
MCH RBC QN AUTO: 28.6 PG (ref 27–31)
MCHC RBC AUTO-ENTMCNC: 32.7 G/DL (ref 33–36)
MCV RBC AUTO: 87.6 FL (ref 80–94)
MONOCYTES # BLD AUTO: 0.49 X10(3)/MCL (ref 0.1–1.3)
MONOCYTES NFR BLD AUTO: 8.1 %
NEUTROPHILS # BLD AUTO: 3.15 X10(3)/MCL (ref 2.1–9.2)
NEUTROPHILS NFR BLD AUTO: 51.8 %
NRBC BLD AUTO-RTO: 0 %
PLATELET # BLD AUTO: 274 X10(3)/MCL (ref 130–400)
PMV BLD AUTO: 9.4 FL (ref 7.4–10.4)
POTASSIUM SERPL-SCNC: 4.2 MMOL/L (ref 3.5–5.1)
PROT SERPL-MCNC: 6.7 GM/DL (ref 5.8–7.6)
RBC # BLD AUTO: 4.61 X10(6)/MCL (ref 4.2–5.4)
SODIUM SERPL-SCNC: 139 MMOL/L (ref 136–145)
TRIGL SERPL-MCNC: 59 MG/DL (ref 37–140)
VLDLC SERPL CALC-MCNC: 12 MG/DL
WBC # SPEC AUTO: 6.08 X10(3)/MCL (ref 4.5–11.5)

## 2024-06-11 PROCEDURE — 87389 HIV-1 AG W/HIV-1&-2 AB AG IA: CPT

## 2024-06-11 PROCEDURE — 86803 HEPATITIS C AB TEST: CPT

## 2024-06-11 PROCEDURE — 36415 COLL VENOUS BLD VENIPUNCTURE: CPT

## 2024-06-11 PROCEDURE — 80053 COMPREHEN METABOLIC PANEL: CPT

## 2024-06-11 PROCEDURE — 85025 COMPLETE CBC W/AUTO DIFF WBC: CPT

## 2024-06-11 PROCEDURE — 80061 LIPID PANEL: CPT

## 2024-06-18 ENCOUNTER — OFFICE VISIT (OUTPATIENT)
Dept: FAMILY MEDICINE | Facility: CLINIC | Age: 64
End: 2024-06-18
Payer: MEDICAID

## 2024-06-18 VITALS
HEIGHT: 63 IN | HEART RATE: 101 BPM | RESPIRATION RATE: 18 BRPM | TEMPERATURE: 98 F | SYSTOLIC BLOOD PRESSURE: 118 MMHG | BODY MASS INDEX: 20.73 KG/M2 | WEIGHT: 117 LBS | DIASTOLIC BLOOD PRESSURE: 82 MMHG | OXYGEN SATURATION: 96 %

## 2024-06-18 DIAGNOSIS — M54.16 LUMBAR RADICULOPATHY: ICD-10-CM

## 2024-06-18 DIAGNOSIS — I10 HYPERTENSION, UNSPECIFIED TYPE: ICD-10-CM

## 2024-06-18 DIAGNOSIS — Z00.00 WELLNESS EXAMINATION: Primary | ICD-10-CM

## 2024-06-18 DIAGNOSIS — Z12.31 SCREENING MAMMOGRAM, ENCOUNTER FOR: ICD-10-CM

## 2024-06-18 PROCEDURE — 4010F ACE/ARB THERAPY RXD/TAKEN: CPT | Mod: CPTII,,, | Performed by: FAMILY MEDICINE

## 2024-06-18 PROCEDURE — 3079F DIAST BP 80-89 MM HG: CPT | Mod: CPTII,,, | Performed by: FAMILY MEDICINE

## 2024-06-18 PROCEDURE — 99396 PREV VISIT EST AGE 40-64: CPT | Mod: ,,, | Performed by: FAMILY MEDICINE

## 2024-06-18 PROCEDURE — 3074F SYST BP LT 130 MM HG: CPT | Mod: CPTII,,, | Performed by: FAMILY MEDICINE

## 2024-06-18 PROCEDURE — 1160F RVW MEDS BY RX/DR IN RCRD: CPT | Mod: CPTII,,, | Performed by: FAMILY MEDICINE

## 2024-06-18 PROCEDURE — 1159F MED LIST DOCD IN RCRD: CPT | Mod: CPTII,,, | Performed by: FAMILY MEDICINE

## 2024-06-18 PROCEDURE — 3008F BODY MASS INDEX DOCD: CPT | Mod: CPTII,,, | Performed by: FAMILY MEDICINE

## 2024-06-18 RX ORDER — ENALAPRIL MALEATE 2.5 MG/1
2.5 TABLET ORAL 2 TIMES DAILY
Qty: 60 TABLET | Refills: 3 | Status: SHIPPED | OUTPATIENT
Start: 2024-06-18 | End: 2024-10-16

## 2024-06-18 NOTE — PROGRESS NOTES
"Subjective:      Patient ID: Antonina Jaimes is a 63 y.o. female.    Chief Complaint: wellness      Wellness      Hypertension  - tolerating medication (no side effects)  - no headaches  - patient reports elevated BP in afternoon, taking extra 1/2 tab of enalapril    Review of Systems   Constitutional: Negative.    Respiratory: Negative.     Cardiovascular: Negative.    Gastrointestinal: Negative.  Negative for fecal incontinence.   Genitourinary:  Negative for bladder incontinence.   Musculoskeletal:  Positive for back pain.        Back pain: radiates to left leg, hx of prior compression fracture, burning in leg/heel   Psychiatric/Behavioral: Negative.           Objective:     /82 (BP Location: Left arm, Patient Position: Sitting, BP Method: Large (Manual))   Pulse 101   Temp 97.5 °F (36.4 °C)   Resp 18   Ht 5' 3" (1.6 m)   Wt 53.1 kg (117 lb)   SpO2 96%   BMI 20.73 kg/m²    Physical Exam  Constitutional:       Appearance: Normal appearance.   Cardiovascular:      Rate and Rhythm: Normal rate and regular rhythm.   Pulmonary:      Effort: Pulmonary effort is normal.      Breath sounds: Normal breath sounds.   Abdominal:      General: Abdomen is flat. Bowel sounds are normal.      Palpations: Abdomen is soft.   Musculoskeletal:      Comments: Antalgic gait, using simple cane to aid with ambulation   Neurological:      Mental Status: She is alert.   Psychiatric:         Mood and Affect: Mood normal.         Behavior: Behavior normal.         Thought Content: Thought content normal.         Judgment: Judgment normal.       Recent Results (from the past 504 hour(s))   Comprehensive Metabolic Panel    Collection Time: 06/11/24  7:47 AM   Result Value Ref Range    Sodium 139 136 - 145 mmol/L    Potassium 4.2 3.5 - 5.1 mmol/L    Chloride 103 98 - 107 mmol/L    CO2 28 23 - 31 mmol/L    Glucose 88 82 - 115 mg/dL    Blood Urea Nitrogen 6.0 (L) 9.8 - 20.1 mg/dL    Creatinine 0.68 0.55 - 1.02 mg/dL    Calcium 9.9 " 8.4 - 10.2 mg/dL    Protein Total 6.7 5.8 - 7.6 gm/dL    Albumin 4.0 3.4 - 4.8 g/dL    Globulin 2.7 2.4 - 3.5 gm/dL    Albumin/Globulin Ratio 1.5 1.1 - 2.0 ratio    Bilirubin Total 0.5 <=1.5 mg/dL    ALP 60 40 - 150 unit/L    ALT 11 0 - 55 unit/L    AST 18 5 - 34 unit/L    eGFR >60 mL/min/1.73/m2    Anion Gap 8.0 mEq/L    BUN/Creatinine Ratio 9    Hepatitis C Antibody    Collection Time: 06/11/24  7:47 AM   Result Value Ref Range    Hep C Ab Interp Nonreactive Nonreactive   HIV 1/2 Ag/Ab (4th Gen)    Collection Time: 06/11/24  7:47 AM   Result Value Ref Range    HIV Nonreactive Nonreactive   Lipid Panel    Collection Time: 06/11/24  7:47 AM   Result Value Ref Range    Cholesterol Total 166 <=200 mg/dL    HDL Cholesterol 79 (H) 35 - 60 mg/dL    Triglyceride 59 37 - 140 mg/dL    Cholesterol/HDL Ratio 2 0 - 5    Very Low Density Lipoprotein 12     LDL Cholesterol 75.00 50.00 - 140.00 mg/dL   CBC with Differential    Collection Time: 06/11/24  7:47 AM   Result Value Ref Range    WBC 6.08 4.50 - 11.50 x10(3)/mcL    RBC 4.61 4.20 - 5.40 x10(6)/mcL    Hgb 13.2 12.0 - 16.0 g/dL    Hct 40.4 37.0 - 47.0 %    MCV 87.6 80.0 - 94.0 fL    MCH 28.6 27.0 - 31.0 pg    MCHC 32.7 (L) 33.0 - 36.0 g/dL    RDW 13.4 11.5 - 17.0 %    Platelet 274 130 - 400 x10(3)/mcL    MPV 9.4 7.4 - 10.4 fL    Neut % 51.8 %    Lymph % 35.0 %    Mono % 8.1 %    Eos % 4.1 %    Basophil % 0.8 %    Lymph # 2.13 0.6 - 4.6 x10(3)/mcL    Neut # 3.15 2.1 - 9.2 x10(3)/mcL    Mono # 0.49 0.1 - 1.3 x10(3)/mcL    Eos # 0.25 0 - 0.9 x10(3)/mcL    Baso # 0.05 <=0.2 x10(3)/mcL    IG# 0.01 0 - 0.04 x10(3)/mcL    IG% 0.2 %    NRBC% 0.0 %            Assessment:     Problem List Items Addressed This Visit          Cardiac/Vascular    Hypertension    Relevant Medications    enalapril (VASOTEC) 2.5 MG tablet     Other Visit Diagnoses       Wellness examination    -  Primary    Lumbar radiculopathy        Relevant Orders    X-Ray Lumbar Spine Ap And Lateral    Screening  mammogram, encounter for        Relevant Orders    Mammo Digital Screening Bilat w/ [facility]             Plan:   1. Wellness examination  Lab work discussed with patient  Continue current medication  Continue diet/exercise  Return to clinic with any concerns    2. Hypertension, unspecified type  -     enalapril (VASOTEC) 2.5 MG tablet; Take 1 tablet (2.5 mg total) by mouth 2 (two) times daily.  Dispense: 60 tablet; Refill: 3  Change Enalapril to 2.5 mg BID  Monitor BP  Return to clinic with any concerns    3. Lumbar radiculopathy  Schedule MRI L-spine   Continue cane to aid with ambulation  Return to clinic with any concerns    4. Screening mammogram, encounter for  -     Mammo Digital Screening Bilat w/ Florentino; Future; Expected date: 06/18/2024  Schedule mammogram

## 2024-07-01 DIAGNOSIS — R07.9 CHEST PAIN, UNSPECIFIED TYPE: ICD-10-CM

## 2024-07-01 RX ORDER — DIGOXIN 125 MCG
0.12 TABLET ORAL DAILY
Qty: 30 TABLET | Refills: 3 | Status: SHIPPED | OUTPATIENT
Start: 2024-07-01

## 2024-07-29 DIAGNOSIS — E78.5 HYPERLIPIDEMIA, UNSPECIFIED HYPERLIPIDEMIA TYPE: ICD-10-CM

## 2024-07-29 RX ORDER — SIMVASTATIN 20 MG/1
TABLET, FILM COATED ORAL
Qty: 30 TABLET | Refills: 3 | Status: SHIPPED | OUTPATIENT
Start: 2024-07-29

## 2024-08-06 DIAGNOSIS — I10 HYPERTENSION, UNSPECIFIED TYPE: ICD-10-CM

## 2024-08-06 RX ORDER — ENALAPRIL MALEATE 2.5 MG/1
2.5 TABLET ORAL 2 TIMES DAILY
Qty: 60 TABLET | Refills: 3 | Status: SHIPPED | OUTPATIENT
Start: 2024-08-06 | End: 2024-12-04

## 2024-08-18 DIAGNOSIS — G47.00 INSOMNIA, UNSPECIFIED TYPE: ICD-10-CM

## 2024-08-19 RX ORDER — TRAZODONE HYDROCHLORIDE 50 MG/1
50 TABLET ORAL NIGHTLY
Qty: 30 TABLET | Refills: 3 | Status: SHIPPED | OUTPATIENT
Start: 2024-08-19

## 2024-10-07 ENCOUNTER — OFFICE VISIT (OUTPATIENT)
Dept: FAMILY MEDICINE | Facility: CLINIC | Age: 64
End: 2024-10-07
Payer: MEDICARE

## 2024-10-07 VITALS
RESPIRATION RATE: 20 BRPM | OXYGEN SATURATION: 97 % | TEMPERATURE: 97 F | HEIGHT: 63 IN | DIASTOLIC BLOOD PRESSURE: 88 MMHG | BODY MASS INDEX: 21.44 KG/M2 | HEART RATE: 90 BPM | WEIGHT: 121 LBS | SYSTOLIC BLOOD PRESSURE: 156 MMHG

## 2024-10-07 DIAGNOSIS — G89.29 CHRONIC BILATERAL LOW BACK PAIN WITH LEFT-SIDED SCIATICA: Primary | ICD-10-CM

## 2024-10-07 DIAGNOSIS — I50.9 CHRONIC CONGESTIVE HEART FAILURE, UNSPECIFIED HEART FAILURE TYPE: ICD-10-CM

## 2024-10-07 DIAGNOSIS — M54.42 CHRONIC BILATERAL LOW BACK PAIN WITH LEFT-SIDED SCIATICA: Primary | ICD-10-CM

## 2024-10-07 PROBLEM — F10.10 ALCOHOL ABUSE: Status: ACTIVE | Noted: 2024-10-07

## 2024-10-07 NOTE — PROGRESS NOTES
Patient ID: [id number]     Chief Complaint: Establish Care (Was seeing Dr Mcneil), Back Pain (Dr Mcneil tied to get mri approved was denied/She needs to attend physical therapy ), and left foot pain (Shocking pains in her left heel and numbness in her toes/Left knee pain x-rays were done while she was in the hospital while she was getting a hip replacement after her fall)        HPI:     Antonina Jaimes is a 64 y.o. female here today for Establish Care (Was seeing Dr Mcneil), Back Pain (Dr Mcneil tied to get mri approved was denied/She needs to attend physical therapy ), and left foot pain (Shocking pains in her left heel and numbness in her toes/Left knee pain x-rays were done while she was in the hospital while she was getting a hip replacement after her fall).       -------------------------------------    CHF (congestive heart failure)    COPD (chronic obstructive pulmonary disease)    GERD (gastroesophageal reflux disease)    Hyperlipidemia    Hypertension    Hypokalemia    Insomnia    Nicotine dependence    Osteoporosis        Past Surgical History:   Procedure Laterality Date    ADENOIDECTOMY      COLONOSCOPY N/A 8/8/2023    Procedure: COLONOSCOPY;  Surgeon: Mikhail Marcano MD;  Location: [facility];  Service: Endoscopy;  Laterality: N/A;  Scant ascending colon diverticular disease   sigmoid diverticular     COLONOSCOPY, WITH POLYPECTOMY USING HOT BIOPSY FORCEPS N/A 8/8/2023    Procedure: COLONOSCOPY, WITH POLYPECTOMY USING HOT BIOPSY FORCEPS;  Surgeon: Mikhail Marcano MD;  Location: [facility];  Service: Endoscopy;  Laterality: N/A;  B. Polyp at 10-15cm; no heat     COLONOSCOPY, WITH POLYPECTOMY USING SNARE N/A 8/8/2023    Procedure: COLONOSCOPY, WITH POLYPECTOMY USING SNARE;  Surgeon: Mikhail Marcano MD;  Location: [facility];  Service: Endoscopy;  Laterality: N/A;  A. appendicle polyp       EGD, WITH CLOSED BIOPSY  07/15/2023    Procedure: EGD, WITH CLOSED BIOPSY;  Surgeon: Tyree  MD Tracie;  Location: [facility];  Service: Gastroenterology;;    EGD, WITH HEMORRHAGE CONTROL  07/15/2023    Procedure: EGD,WITH HEMORRHAGE CONTROL;  Surgeon: Tyree Hodges MD;  Location: [facility];  Service: Gastroenterology;;  gold probe 7FR    ESOPHAGOGASTRODUODENOSCOPY N/A 07/15/2023    Procedure: EGD (ESOPHAGOGASTRODUODENOSCOPY);  Surgeon: Tyree Hodges MD;  Location: [facility];  Service: Gastroenterology;  Laterality: N/A;    HEMORRHOID SURGERY      HIP REPLACEMENT ARTHROPLASTY Left 1/22/2024    Procedure: ARTHROPLASTY, HIP REPLACEMENT;  Surgeon: Oleg Diaz MD;  Location: [facility];  Service: Orthopedics;  Laterality: Left;    HYSTERECTOMY      TONSILLECTOMY         Review of patient's allergies indicates:  No Known Allergies    Outpatient Medications Marked as Taking for the 10/7/24 encounter (Office Visit) with Zachery Ling DO   Medication Sig Dispense Refill    albuterol (VENTOLIN HFA) 90 mcg/actuation inhaler Inhale 2 puffs into the lungs every 6 (six) hours as needed for Wheezing. Rescue 18 g 0    albuterol sulfate 2.5 mg/0.5 mL Nebu Take 2.5 mg by nebulization every 4 (four) hours as needed (Cough or wheezing). Rescue 30 each 1    albuterol-ipratropium (DUO-NEB) 2.5 mg-0.5 mg/3 mL nebulizer solution Take 3 mLs by nebulization every 6 (six) hours as needed for Shortness of Breath. 75 mL 1    aspirin 81 MG Chew Take 4 tablets (324 mg total) by mouth 2 (two) times daily. 240 tablet 0    digoxin (LANOXIN) 125 mcg tablet Take 1 tablet (0.125 mg total) by mouth once daily. 30 tablet 3    enalapril (VASOTEC) 2.5 MG tablet Take 1 tablet (2.5 mg total) by mouth 2 (two) times daily. 60 tablet 3    simvastatin (ZOCOR) 20 MG tablet TAKE 1 TABLET BY MOUTH EVERYDAY AT BEDTIME 30 tablet 3    traZODone (DESYREL) 50 MG tablet TAKE 1 TABLET BY MOUTH EVERY DAY IN THE EVENING 30 tablet 3       Social History     Socioeconomic History    Marital status: Single   Tobacco Use    Smoking status: Every Day      Current packs/day: 0.50     Average packs/day: 1 pack/day for 40.0 years (39.9 ttl pk-yrs)     Types: Cigarettes     Start date: 10/7/1984    Smokeless tobacco: Never   Substance and Sexual Activity    Alcohol use: Yes     Alcohol/week: 2.0 standard drinks of alcohol     Types: 2 Cans of beer per week    Drug use: Never    Sexual activity: Yes     Social Drivers of Health     Financial Resource Strain: Low Risk  (2/1/2024)    Overall Financial Resource Strain (CARDIA)     Difficulty of Paying Living Expenses: Not very hard   Food Insecurity: No Food Insecurity (2/1/2024)    Hunger Vital Sign     Worried About Running Out of Food in the Last Year: Never true     Ran Out of Food in the Last Year: Never true   Transportation Needs: No Transportation Needs (2/1/2024)    PRAPARE - Transportation     Lack of Transportation (Medical): No     Lack of Transportation (Non-Medical): No   Physical Activity: Sufficiently Active (2/1/2024)    Exercise Vital Sign     Days of Exercise per Week: 7 days     Minutes of Exercise per Session: 90 min   Stress: No Stress Concern Present (2/1/2024)    [employer] [place] [employer] - Occupational Stress Questionnaire     Feeling of Stress : Only a little   Housing Stability: Low Risk  (2/1/2024)    Housing Stability Vital Sign     Unable to Pay for Housing in the Last Year: No     Number of Places Lived in the Last Year: 1     Unstable Housing in the Last Year: No        Family History   Problem Relation Name Age of Onset    Hypertension Mother      Diabetes Mother      Hypertension Father          Patient Care Team:  Zachery Ling DO as PCP - General (Family Medicine)     Subjective:     Review of Systems   Constitutional:  Negative for chills and fever.   Respiratory:  Negative for shortness of breath.    Cardiovascular:  Negative for chest pain.   Gastrointestinal:  Negative for constipation and diarrhea.   Musculoskeletal:  Positive for back pain and joint pain.  "  Neurological:  Positive for tingling. Negative for dizziness and headaches.   Psychiatric/Behavioral:  The patient does not have insomnia.        See HPI for details  All Other ROS: Negative except as stated in HPI.       Objective:     BP (!) 156/88 (BP Location: Left arm, Patient Position: Sitting)   Pulse 90   Temp 97 °F (36.1 °C)   Resp 20   Ht 5' 3" (1.6 m)   Wt 54.9 kg (121 lb)   SpO2 97%   BMI 21.43 kg/m²     Physical Exam  Vitals reviewed.   Constitutional:       General: She is not in acute distress.     Appearance: Normal appearance.   Cardiovascular:      Rate and Rhythm: Normal rate and regular rhythm.      Heart sounds: No murmur heard.     No friction rub. No gallop.   Pulmonary:      Effort: No respiratory distress.      Breath sounds: No wheezing, rhonchi or rales.   Musculoskeletal:         General: No swelling, tenderness or deformity.      Right lower leg: No edema.      Left lower leg: No edema.   Skin:     General: Skin is warm and dry.      Findings: No lesion or rash.   Neurological:      General: No focal deficit present.      Mental Status: She is alert.   Psychiatric:         Mood and Affect: Mood normal.         Assessment/Plan:     1. Chronic bilateral low back pain with left-sided sciatica  -     Ambulatory referral/consult to Physical/Occupational Therapy; Future; Expected date: 10/07/2024    2. Chronic congestive heart failure, unspecified heart failure type  -     Ambulatory referral/consult to Cardiology; Future; Expected date: 10/07/2024      Recommended tylenol arthritis TID for low back pain.   Follow up:     Follow up in about 3 months (around 1/7/2025) for Follow up. In addition to their scheduled follow up, the patient has also been instructed to follow up on as needed basis.         "

## 2024-10-14 ENCOUNTER — CLINICAL SUPPORT (OUTPATIENT)
Dept: REHABILITATION | Facility: HOSPITAL | Age: 64
End: 2024-10-14
Attending: FAMILY MEDICINE
Payer: MEDICARE

## 2024-10-14 DIAGNOSIS — M54.42 CHRONIC BILATERAL LOW BACK PAIN WITH LEFT-SIDED SCIATICA: ICD-10-CM

## 2024-10-14 DIAGNOSIS — G89.29 CHRONIC BILATERAL LOW BACK PAIN WITH LEFT-SIDED SCIATICA: ICD-10-CM

## 2024-10-14 PROCEDURE — 97530 THERAPEUTIC ACTIVITIES: CPT

## 2024-10-14 PROCEDURE — 97163 PT EVAL HIGH COMPLEX 45 MIN: CPT

## 2024-10-14 NOTE — PROGRESS NOTES
"[facility]  Physical Therapy Initial Evaluation    Name: Antonina Jaimes  Clinic Number: [id number]    Therapy Diagnosis: No diagnosis found.  Physician: Zachery Ling DO    Physician Orders: PT Eval and Treat  Medical Diagnosis from Referral: low back pain w B sciatica  Evaluation Date: 10/14/2024  Authorization Period Expiration: medically necessary  Plan of Care Expiration: 1/14/2025  Visit # / Visits authorized: 0/ medically necessary    Time In: 1248  Time Out: 145  Total Appointment Time (timed & untimed codes): 57 minutes  Total Treatment time (time-based codes) separate from Evaluation: 10 minutes    Surgery: L MILADIS January 2024  Orthopedic Precautions: h/o LBP  Pertinent History: MILADIS in January of this year following fall    Subjective     Antonina reports: she is referred for low back pain for many years.  She has had multiple X-rays and MRI's of her lumbar spine that show "problems at all the levels".    Then she fell in January of this year and required L MILADIS.  She never has had therapy for the back or the hip replacement.  She feels that the left leg has actually gotten worse since the hip surgery and she is seeking help for her back and leg pain now.    She uses a cane to walk and reports difficulty with walking and standing tolerance.      Imaging  Awaiting MRI at this time.        Medical History:   Past Medical History:   Diagnosis Date    CHF (congestive heart failure)     COPD (chronic obstructive pulmonary disease)     GERD (gastroesophageal reflux disease)     Hyperlipidemia     Hypertension     Hypokalemia     Insomnia     Nicotine dependence     Osteoporosis        Surgical History:   Antonina Jaimes  has a past surgical history that includes Hysterectomy; Tonsillectomy; Adenoidectomy; Esophagogastroduodenoscopy (N/A, 07/15/2023); egd, with closed biopsy (07/15/2023); egd, with hemorrhage control (07/15/2023); Hemorrhoid surgery; Colonoscopy (N/A, 8/8/2023); " colonoscopy, with polypectomy using snare (N/A, 2023); colonoscopy, with polypectomy using hot biopsy forceps (N/A, 2023); and Hip replacement arthroplasty (Left, 2024).    Medications:   Antonina has a current medication list which includes the following prescription(s): albuterol, albuterol sulfate, albuterol-ipratropium, aspirin, digoxin, enalapril, fluticasone-umeclidin-vilanter, simvastatin, and trazodone.    Allergies:   Review of patient's allergies indicates:  No Known Allergies      CMS Impairment/Limitation/Restriction for FOTO Survey  Therapist reviewed FOTO scores for Antonina Jaimes on 10/14/2024. FOTO documents entered into [employer] - see Media section.  Patient's Physical FS Primary Measure: 37  Risk Adjusted Statistical FOTO: 53  Limitation Score: 63%  Category: Mobility  MDQ: 54% disability    Objective     Eval 10/14/2025   Pain:    8/10 now at low back and left leg   Posture:     Flexed posture at her trunk and hips.   Gait Analysis:     Patient ambulates w antalgic gait pattern left and uses SC for support.  Decreased WB thru left LE.     Palpation    Pain at L1 left    Decreased mobility of lumbar vertebrae noted w PA glide attempts     Dermatomes    Numbness and pain at left heel.     Myotomes      HIP FLX - Right 5/5, Left 3/5  HIP EXT - Right 3/5, Left 2/5    HIP ABD - Right 4/5, Left 3-/5  HIP ADD - Right 4/5, Left 3/5  KNEE FLX - Right 3-/5, Left 3-/5  KNEE EXT - Right 5/5, Left 5/5  ANKLE DF - Right 4/5, Left 4-/5  ANKLE PF - Right 3+/5, Left 3+/5       Hip/SI Clearance    JENNY: 25% left  FADDIR: neg B    SACRAL DISTRACTION: neg B  SACRAL THIGH THRUST: neg B  SACRAL THRUST: neg B  SACRAL COMPRESSION: NT     AROM    Flexion: 50%  Extension: unable         Flexibility    Jaime Test: mod restr B hip flexors  Rectus femorus: mod restr B  90/90 Hamstrin L, 50 R  Piriformis Test: mod restr L     Functional Testing    5x STS = unable    Sit to stand = mod max difficulty with  decreased WB thru left LE, UE support t/o  Bed mobility = mod to max difficulty  Transitional movements = mod to max difficulty    Balance    Tandem R forward = unable  Tandem L forward = unable    SLS = 10 sec R w UE support mod difficulty  SLS = 10 sec L w B UE support max difficulty          TREATMENT     Antonina received the treatments listed below:       Time Activities   Manual     TherAct 10 min Prone positioning and POC discussion.  HEP.   TherEx     Gait     Neuro Re-ed     Modalities     E-Stim     Dry Needling     Canalith Repositioning         Home Exercises and Patient Education Provided    Education provided:   -Plan of care, HEP, POC.    Written Home Exercises Provided: yes.  Exercises were reviewed and Antonina was able to demonstrate them prior to the end of the session.  Antonina demonstrated good  understanding of the education provided.     See EMR under Patient Instructions for exercises provided  10/14/2024 .    Assessment   Antonina is a 64 y.o. female referred to outpatient Physical Therapy with a medical diagnosis of low back pain and L LE weakness and pain.     Patient presents with significant postural deficits.  She has limited spinal ROM at lumbar, thoracic and cervical region.  She demonstrates poor flexibility throughout low back and L hip.  She has weakness evident at left hip in all planes. It is positive that she has good plantar flexion strength B. Palpation reveals decreased mobility at lumbar segments, most notably at L1/L2 left lateral segment.     Her functional movement is significantly limited during sit to stand, bed mobility and with transitional movements and she is dependent on a SC for all upright activity.    Initiated exercise to include prone positioning with reported relief.  Discussed HEP and POC.     Patient will benefit from skilled outpatient Physical Therapy to address the deficits stated above, provide education, and to maximize patient's level of independence.      Patient prognosis is Good.     Plan of care discussed with patient: Yes  Patient's spiritual, cultural and educational needs considered and patient is agreeable to the plan of care and goals as stated below:     Anticipated Barriers for therapy: chronic pain and dysfunction    Goals:  Short Term Goals: 6 weeks   Patient will report at least 10% disability reduction from initial MDQ score to indicate clinically significant functional improvement  Patient will report at least 10% increase on initial FOTO score to indicate clinically significant functional improvement  Patient will report 50% pain reduction    Long Term Goals: 12 weeks   Patient will report at least 20% disability reduction from initial MDQ score to indicate clinically significant functional improvement  Patient will report at least 20% increase on initial FOTO score to indicate clinically significant functional improvement  Patient will report 50% overall perceived improvement  Patient will demonstrate independence and compliance with HEP    Plan   Plan of care Certification: 10/14/2024 to 1/14/2025.    Outpatient Physical Therapy 2-3 times weekly for 12 weeks to include the following interventions: Cervical/Lumbar Traction, Gait Training, Manual Therapy, Moist Heat/ Ice, Neuromuscular Re-ed, Patient Education, Self Care, Therapeutic Activities, and Therapeutic Exercise.     Dalila Marquez, PT

## 2024-10-16 ENCOUNTER — CLINICAL SUPPORT (OUTPATIENT)
Dept: REHABILITATION | Facility: HOSPITAL | Age: 64
End: 2024-10-16
Attending: FAMILY MEDICINE
Payer: MEDICARE

## 2024-10-16 DIAGNOSIS — G89.29 CHRONIC BILATERAL LOW BACK PAIN WITH LEFT-SIDED SCIATICA: Primary | ICD-10-CM

## 2024-10-16 DIAGNOSIS — S72.002D CLOSED FRACTURE OF LEFT HIP WITH ROUTINE HEALING, SUBSEQUENT ENCOUNTER: ICD-10-CM

## 2024-10-16 DIAGNOSIS — M54.16 LUMBAR RADICULOPATHY: ICD-10-CM

## 2024-10-16 DIAGNOSIS — M54.42 CHRONIC BILATERAL LOW BACK PAIN WITH LEFT-SIDED SCIATICA: Primary | ICD-10-CM

## 2024-10-16 PROCEDURE — 97110 THERAPEUTIC EXERCISES: CPT

## 2024-10-16 PROCEDURE — 97530 THERAPEUTIC ACTIVITIES: CPT

## 2024-10-16 NOTE — PROGRESS NOTES
Physical Therapy Treatment Note     Name: Antonina Jaimes  Clinic Number: [id number]    Therapy Diagnosis:   Encounter Diagnosis   Name Primary?    Chronic bilateral low back pain with left-sided sciatica Yes     Physician: Zachery Ling DO    Visit Date: 10/16/2024    Physician Orders: PT Eval and Treat  Medical Diagnosis from Referral: low back pain w B sciatica  Evaluation Date: 10/14/2024  Authorization Period Expiration: medically necessary  Plan of Care Expiration: 1/14/2025  Visit # / Visits authorized: 1/ medically necessary     Time In: 1248  Time Out: 145  Total Appointment Time (timed & untimed codes): 57 minutes       Surgery: L MILADIS January 2024  Orthopedic Precautions: h/o LBP  Pertinent History: MILADIS in January of this year following fall    Subjective     Patient reports: persistent left hip pain and weakness.  No significant changes since evaluation.    CMS Impairment/Limitation/Restriction for FOTO Survey  Therapist reviewed FOTO scores for Antonina Jaimes on 10/14/2024. FOTO documents entered into [employer] - see Media section.  Patient's Physical FS Primary Measure: 37  Risk Adjusted Statistical FOTO: 53  Limitation Score: 63%  Category: Mobility  MDQ: 54% disability    Objective     Eval 10/14/2025   Pain:     8/10 now at low back and left leg   Posture:      Flexed posture at her trunk and hips.   Gait Analysis:      Patient ambulates w antalgic gait pattern left and uses SC for support.  Decreased WB thru left LE.      Palpation     Pain at L1 left     Decreased mobility of lumbar vertebrae noted w PA glide attempts      Dermatomes     Numbness and pain at left heel.      Myotomes        HIP FLX - Right 5/5, Left 3/5  HIP EXT - Right 3/5, Left 2/5     HIP ABD - Right 4/5, Left 3-/5  HIP ADD - Right 4/5, Left 3/5  KNEE FLX - Right 3-/5, Left 3-/5  KNEE EXT - Right 5/5, Left 5/5  ANKLE DF - Right 4/5, Left 4-/5  ANKLE PF - Right 3+/5, Left 3+/5         Hip/SI Clearance     JENNY: 25%  left  FADDIR: neg B     SACRAL DISTRACTION: neg B  SACRAL THIGH THRUST: neg B  SACRAL THRUST: neg B  SACRAL COMPRESSION: NT      AROM     Flexion: 50%  Extension: unable            Flexibility     Jaime Test: mod restr B hip flexors  Rectus femorus: mod restr B  90/90 Hamstrin L, 50 R  Piriformis Test: mod restr L      Functional Testing     5x STS = unable     Sit to stand = mod max difficulty with decreased WB thru left LE, UE support t/o  Bed mobility = mod to max difficulty  Transitional movements = mod to max difficulty     Balance     Tandem R forward = unable  Tandem L forward = unable     SLS = 10 sec R w UE support mod difficulty  SLS = 10 sec L w B UE support max difficulty     Antonina received the following treatment:     Time Activities   Manual  min    TherAct 13 min Steps and standing activity   TherEx 30 min Muscle balance restoration at left hip girdle   Gait     Neuro Re-ed     Modalities 10 min MH prior to exercise and stretching   E-Stim     Dry Needling     Canalith Repositioning           Home Exercises Provided and Patient Education Provided     Education provided:   -Plan of care, HEP, safety w walking and need for AD    Assessment     Exercise focus on gentle functional movement for self stretching and restoration of muscle balance throughout low back and hip girdle.  She is extremely guarded with all movement and maintains a flexed posture at all times.  She had moderate difficulty with all attempts at pelvic movement, but she was able to complete all exercises for a few reps.      She did have pain complaints of left pectoral region when she strained to change position.  Pulse and VSS. Appears to be a strained muscle.  Patient was able to complete exercises after the strain without further incident.      Will progress strengthening and functional activity tolerance as able.    Patient prognosis is Fair.      Anticipated barriers to physical therapy: chronic multiple pain complaints  and dysfunctions.    Goals: Antonina Is progressing well towards her goals.  Short Term Goals: 6 weeks   Patient will report at least 10% disability reduction from initial MDQ score to indicate clinically significant functional improvement  Patient will report at least 10% increase on initial FOTO score to indicate clinically significant functional improvement  Patient will report 50% pain reduction     Long Term Goals: 12 weeks   Patient will report at least 20% disability reduction from initial MDQ score to indicate clinically significant functional improvement  Patient will report at least 20% increase on initial FOTO score to indicate clinically significant functional improvement  Patient will report 50% overall perceived improvement  Patient will demonstrate independence and compliance with HEP  Plan     2-3x/week x 12 weeks    Dalila Marquez, PT

## 2024-10-18 ENCOUNTER — CLINICAL SUPPORT (OUTPATIENT)
Dept: REHABILITATION | Facility: HOSPITAL | Age: 64
End: 2024-10-18
Attending: FAMILY MEDICINE
Payer: MEDICARE

## 2024-10-18 DIAGNOSIS — Z96.642 HISTORY OF LEFT HIP REPLACEMENT: ICD-10-CM

## 2024-10-18 DIAGNOSIS — G89.29 CHRONIC BILATERAL LOW BACK PAIN WITH LEFT-SIDED SCIATICA: Primary | ICD-10-CM

## 2024-10-18 DIAGNOSIS — M54.42 CHRONIC BILATERAL LOW BACK PAIN WITH LEFT-SIDED SCIATICA: Primary | ICD-10-CM

## 2024-10-18 PROCEDURE — 97530 THERAPEUTIC ACTIVITIES: CPT

## 2024-10-18 PROCEDURE — 97110 THERAPEUTIC EXERCISES: CPT

## 2024-10-18 NOTE — PROGRESS NOTES
Physical Therapy Treatment Note     Name: Antonina Jaimes  Clinic Number: [id number]    Therapy Diagnosis:   Encounter Diagnoses   Name Primary?    Chronic bilateral low back pain with left-sided sciatica Yes    History of left hip replacement      Physician: Zachery Ling DO    Visit Date: 10/18/2024    Physician Orders: PT Eval and Treat  Medical Diagnosis from Referral: low back pain w B sciatica  Evaluation Date: 10/14/2024  Authorization Period Expiration: medically necessary  Plan of Care Expiration: 1/14/2025  Visit # / Visits authorized: 2/ medically necessary     Time In: 1115  Time Out: 1200  Total Appointment Time (timed & untimed codes): 45 minutes       Surgery: L MILADIS January 2024  Orthopedic Precautions: h/o LBP  Pertinent History: MILADIS in January of this year following fall    Subjective     Patient reports: persistent left hip pain and weakness.  No significant changes since evaluation.    CMS Impairment/Limitation/Restriction for FOTO Survey  Therapist reviewed FOTO scores for Antonina Jaimes on 10/14/2024. FOTO documents entered into [employer] - see Media section.  Patient's Physical FS Primary Measure: 37  Risk Adjusted Statistical FOTO: 53  Limitation Score: 63%  Category: Mobility  MDQ: 54% disability    Objective     Eval 10/14/2025   Pain:     8/10 now at low back and left leg   Posture:      Flexed posture at her trunk and hips.   Gait Analysis:      Patient ambulates w antalgic gait pattern left and uses SC for support.  Decreased WB thru left LE.      Palpation     Pain at L1 left     Decreased mobility of lumbar vertebrae noted w PA glide attempts      Dermatomes     Numbness and pain at left heel.      Myotomes        HIP FLX - Right 5/5, Left 3/5  HIP EXT - Right 3/5, Left 2/5     HIP ABD - Right 4/5, Left 3-/5  HIP ADD - Right 4/5, Left 3/5  KNEE FLX - Right 3-/5, Left 3-/5  KNEE EXT - Right 5/5, Left 5/5  ANKLE DF - Right 4/5, Left 4-/5  ANKLE PF - Right 3+/5, Left 3+/5          Hip/SI Clearance     JENNY: 25% left  FADDIR: neg B     SACRAL DISTRACTION: neg B  SACRAL THIGH THRUST: neg B  SACRAL THRUST: neg B  SACRAL COMPRESSION: NT      AROM     Flexion: 50%  Extension: unable            Flexibility     Jaime Test: mod restr B hip flexors  Rectus femorus: mod restr B  90/90 Hamstrin L, 50 R  Piriformis Test: mod restr L      Functional Testing     5x STS = unable     Sit to stand = mod max difficulty with decreased WB thru left LE, UE support t/o  Bed mobility = mod to max difficulty  Transitional movements = mod to max difficulty     Balance     Tandem R forward = unable  Tandem L forward = unable     SLS = 10 sec R w UE support mod difficulty  SLS = 10 sec L w B UE support max difficulty     Antonina received the following treatment:     Time Activities   Manual  min    TherAct 13 min Steps and standing activity   TherEx 27 min Muscle balance restoration at left hip girdle   Gait     Neuro Re-ed     Modalities 10 min MH prior to exercise and stretching   E-Stim     Dry Needling     Canalith Repositioning           Home Exercises Provided and Patient Education Provided     Education provided:   -Plan of care, HEP, safety w walking and need for AD    Assessment     Exercise focus on gentle functional movement for self stretching and restoration of muscle balance throughout low back and hip girdle.  She is guarded with all movement and maintains a flexed posture at all times, but able to tolerate exercise with increased ease today.  She had min difficulty pelvic movement, but she was able to complete all exercises.  Did adjust exercises to be in seated and standing positions and she had a much improved response.    Will progress strengthening and functional activity tolerance as able.    Patient prognosis is Fair.      Anticipated barriers to physical therapy: chronic multiple pain complaints and dysfunctions.    Goals: Antonina Is progressing well towards her goals.  Short Term Goals:  6 weeks   Patient will report at least 10% disability reduction from initial MDQ score to indicate clinically significant functional improvement  Patient will report at least 10% increase on initial FOTO score to indicate clinically significant functional improvement  Patient will report 50% pain reduction     Long Term Goals: 12 weeks   Patient will report at least 20% disability reduction from initial MDQ score to indicate clinically significant functional improvement  Patient will report at least 20% increase on initial FOTO score to indicate clinically significant functional improvement  Patient will report 50% overall perceived improvement  Patient will demonstrate independence and compliance with HEP  Plan     2-3x/week x 12 weeks    Dalila Marquez, PT

## 2024-10-21 ENCOUNTER — CLINICAL SUPPORT (OUTPATIENT)
Dept: REHABILITATION | Facility: HOSPITAL | Age: 64
End: 2024-10-21
Payer: MEDICARE

## 2024-10-21 DIAGNOSIS — M54.42 CHRONIC BILATERAL LOW BACK PAIN WITH LEFT-SIDED SCIATICA: Primary | ICD-10-CM

## 2024-10-21 DIAGNOSIS — G89.29 CHRONIC BILATERAL LOW BACK PAIN WITH LEFT-SIDED SCIATICA: Primary | ICD-10-CM

## 2024-10-21 DIAGNOSIS — Z96.642 HISTORY OF LEFT HIP REPLACEMENT: ICD-10-CM

## 2024-10-21 PROCEDURE — 97530 THERAPEUTIC ACTIVITIES: CPT

## 2024-10-21 PROCEDURE — 97110 THERAPEUTIC EXERCISES: CPT

## 2024-10-21 NOTE — PROGRESS NOTES
Physical Therapy Treatment Note     Name: Antonina Jaimes  Clinic Number: [id number]    Therapy Diagnosis:   Encounter Diagnoses   Name Primary?    Chronic bilateral low back pain with left-sided sciatica Yes    History of left hip replacement      Physician: Zachery Ling DO    Visit Date: 10/21/2024    Physician Orders: PT Eval and Treat  Medical Diagnosis from Referral: low back pain w B sciatica  Evaluation Date: 10/14/2024  Authorization Period Expiration: medically necessary  Plan of Care Expiration: 1/14/2025  Visit # / Visits authorized: 3/ medically necessary     Time In: 125  Time Out: 235  Total Appointment Time (timed & untimed codes): 65 minutes       Surgery: L MILADIS January 2024  Orthopedic Precautions: h/o LBP  Pertinent History: MILADIS in January of this year following fall    Subjective     Patient reports: she has had persistent left LE weakness.  She also reports increase in right paraspinal and lateral trunk pain at the right side that started Saturday during the night.  The only thing she did different is try to get down into a bathtub, but she was unable, so doesn't think that hurt her.    CMS Impairment/Limitation/Restriction for FOTO Survey  Therapist reviewed FOTO scores for Antonina Jaimes on 10/14/2024. FOTO documents entered into [employer] - see Media section.  Patient's Physical FS Primary Measure: 37  Risk Adjusted Statistical FOTO: 53  Limitation Score: 63%  Category: Mobility  MDQ: 54% disability    Objective     Eval 10/14/2025   Pain:     8/10 now at low back and left leg   Posture:      Flexed posture at her trunk and hips.   Gait Analysis:      Patient ambulates w antalgic gait pattern left and uses SC for support.  Decreased WB thru left LE.      Palpation     Pain at L1 left     Decreased mobility of lumbar vertebrae noted w PA glide attempts      Dermatomes     Numbness and pain at left heel.      Myotomes        HIP FLX - Right 5/5, Left 3/5  HIP EXT - Right 3/5, Left 2/5      HIP ABD - Right 4/5, Left 3-/5  HIP ADD - Right 4/5, Left 3/5  KNEE FLX - Right 3-/5, Left 3-/5  KNEE EXT - Right 5/5, Left 5/5  ANKLE DF - Right 4/5, Left 4-/5  ANKLE PF - Right 3+/5, Left 3+/5         Hip/SI Clearance     JENNY: 25% left  FADDIR: neg B     SACRAL DISTRACTION: neg B  SACRAL THIGH THRUST: neg B  SACRAL THRUST: neg B  SACRAL COMPRESSION: NT      AROM     Flexion: 50%  Extension: unable            Flexibility     Jaime Test: mod restr B hip flexors  Rectus femorus: mod restr B  90/90 Hamstrin L, 50 R  Piriformis Test: mod restr L      Functional Testing     5x STS = unable     Sit to stand = mod max difficulty with decreased WB thru left LE, UE support t/o  Bed mobility = mod to max difficulty  Transitional movements = mod to max difficulty     Balance     Tandem R forward = unable  Tandem L forward = unable     SLS = 10 sec R w UE support mod difficulty  SLS = 10 sec L w B UE support max difficulty     Antonina received the following treatment:     Time Activities   Manual  min    TherAct 13 min Steps and standing activity   TherEx 15 min Muscle balance restoration at left hip girdle   Gait     Neuro Re-ed     Modalities 10 min MH prior to exercise and stretching   E-Stim     Dry Needling     Canalith Repositioning       Charges reflect one on one with patient.    Home Exercises Provided and Patient Education Provided     Education provided:   -Plan of care, HEP, safety w walking and need for AD    Assessment     Exercise focus on gentle functional movement for self stretching and restoration of muscle balance throughout low back and hip girdle.  Continued exercises in seated and standing positions, but guarding and pain at right side persisted throughout session. She avoided WB thru left LE 75% of the session.  She did respond favorably to gentle rotational motions and pelvic tilts in standing with equal WB B and reports of relief.    Attempted cupping at low back region w fair  response.    Left hip and LE strengthening is limited due to lumbar radiculopathy and intolerance to exercise.    Scheduling has been a challenge due to limited transportation.    Response to therapy has been inconsistent.     Will progress strengthening and functional activity tolerance as able.    Request for walker to Dr Ling    Patient prognosis is Fair.      Anticipated barriers to physical therapy: chronic multiple pain complaints and dysfunctions.    Goals: Antonina Is progressing well towards her goals.  Short Term Goals: 6 weeks   Patient will report at least 10% disability reduction from initial MDQ score to indicate clinically significant functional improvement  Patient will report at least 10% increase on initial FOTO score to indicate clinically significant functional improvement  Patient will report 50% pain reduction     Long Term Goals: 12 weeks   Patient will report at least 20% disability reduction from initial MDQ score to indicate clinically significant functional improvement  Patient will report at least 20% increase on initial FOTO score to indicate clinically significant functional improvement  Patient will report 50% overall perceived improvement  Patient will demonstrate independence and compliance with HEP  Plan     2-3x/week x 12 weeks    Dalila Marquez, PT

## 2024-10-23 ENCOUNTER — CLINICAL SUPPORT (OUTPATIENT)
Dept: REHABILITATION | Facility: HOSPITAL | Age: 64
End: 2024-10-23
Payer: MEDICARE

## 2024-10-23 DIAGNOSIS — Z96.642 HISTORY OF LEFT HIP REPLACEMENT: ICD-10-CM

## 2024-10-23 DIAGNOSIS — G89.29 CHRONIC BILATERAL LOW BACK PAIN WITH LEFT-SIDED SCIATICA: Primary | ICD-10-CM

## 2024-10-23 DIAGNOSIS — M54.42 CHRONIC BILATERAL LOW BACK PAIN WITH LEFT-SIDED SCIATICA: Primary | ICD-10-CM

## 2024-10-23 PROCEDURE — 97110 THERAPEUTIC EXERCISES: CPT

## 2024-10-23 PROCEDURE — 97010 HOT OR COLD PACKS THERAPY: CPT

## 2024-10-23 PROCEDURE — 97530 THERAPEUTIC ACTIVITIES: CPT

## 2024-10-23 NOTE — PROGRESS NOTES
Physical Therapy Treatment Note     Name: Antonina Jaimes  Clinic Number: [id number]    Therapy Diagnosis:   Encounter Diagnoses   Name Primary?    Chronic bilateral low back pain with left-sided sciatica Yes    History of left hip replacement      Physician: Zachery Ling DO    Visit Date: 10/23/2024    Physician Orders: PT Eval and Treat  Medical Diagnosis from Referral: low back pain w B sciatica  Evaluation Date: 10/14/2024  Authorization Period Expiration: medically necessary  Plan of Care Expiration: 1/14/2025  Visit # / Visits authorized: 4/ medically necessary     Time In: 1241  Time Out: 130  Total Appointment Time (timed & untimed codes): 49 minutes       Surgery: L MILADIS January 2024  Orthopedic Precautions: h/o LBP  Pertinent History: MILADIS in January of this year following fall    Subjective     Patient reports: she continues to have pain at her back and left hip and continues to use a cane or walker for support to walk    CMS Impairment/Limitation/Restriction for FOTO Survey  Therapist reviewed FOTO scores for Antonina Jaimes on 10/14/2024. FOTO documents entered into [employer] - see Media section.  Patient's Physical FS Primary Measure: 37  Risk Adjusted Statistical FOTO: 53  Limitation Score: 63%  Category: Mobility  MDQ: 54% disability    Objective     Eval 10/14/2025   Pain:     8/10 now at low back and left leg   Posture:      Flexed posture at her trunk and hips.   Gait Analysis:      Patient ambulates w antalgic gait pattern left and uses SC for support.  Decreased WB thru left LE.      Palpation     Pain at L1 left     Decreased mobility of lumbar vertebrae noted w PA glide attempts      Dermatomes     Numbness and pain at left heel.      Myotomes        HIP FLX - Right 5/5, Left 3/5  HIP EXT - Right 3/5, Left 2/5     HIP ABD - Right 4/5, Left 3-/5  HIP ADD - Right 4/5, Left 3/5  KNEE FLX - Right 3-/5, Left 3-/5  KNEE EXT - Right 5/5, Left 5/5  ANKLE DF - Right 4/5, Left 4-/5  ANKLE PF - Right  3+/5, Left 3+/5         Hip/SI Clearance     JENNY: 25% left  FADDIR: neg B     SACRAL DISTRACTION: neg B  SACRAL THIGH THRUST: neg B  SACRAL THRUST: neg B  SACRAL COMPRESSION: NT      AROM     Flexion: 50%  Extension: unable            Flexibility     Jaime Test: mod restr B hip flexors  Rectus femorus: mod restr B  90/90 Hamstrin L, 50 R  Piriformis Test: mod restr L      Functional Testing     5x STS = unable     Sit to stand = mod max difficulty with decreased WB thru left LE, UE support t/o  Bed mobility = mod to max difficulty  Transitional movements = mod to max difficulty     Balance     Tandem R forward = unable  Tandem L forward = unable     SLS = 10 sec R w UE support mod difficulty  SLS = 10 sec L w B UE support max difficulty     Antonina received the following treatment:     Time Activities   Manual  min    TherAct 24 min Steps and standing activity   TherEx 15 min Muscle balance restoration at left hip girdle   Gait     Neuro Re-ed     Modalities  MH prior to exercise and stretching   E-Stim     Dry Needling     Canalith Repositioning       Charges reflect one on one with patient.    Home Exercises Provided and Patient Education Provided     Education provided:   -Plan of care, HEP, safety w walking and need for AD    Assessment     Exercise focus on gentle functional movement for self stretching and restoration of muscle balance throughout low back and hip girdle.  Able to tolerate supine for left hip stretching and gentle core exercise today.  Also able to tolerate left quad and LE strengthening with improved response.    Standing activity at handrail w mod difficulty, but able to complete.    Slight improvement in hip ROM and tolerance to hip stretching today.      Response to therapy has been inconsistent. Discussion regarding continuation vs discharge. Do feel that patient would benefit from MRI at her thoracic and lumbar region for further evaluation.    Will progress strengthening and  functional activity tolerance as able at this time per patient request with conservative treatment.    Request for walker to Dr Ling    Patient prognosis is Fair.      Anticipated barriers to physical therapy: chronic multiple pain complaints and dysfunctions.    Goals: Antonina Is progressing well towards her goals.  Short Term Goals: 6 weeks   Patient will report at least 10% disability reduction from initial MDQ score to indicate clinically significant functional improvement  Patient will report at least 10% increase on initial FOTO score to indicate clinically significant functional improvement  Patient will report 50% pain reduction     Long Term Goals: 12 weeks   Patient will report at least 20% disability reduction from initial MDQ score to indicate clinically significant functional improvement  Patient will report at least 20% increase on initial FOTO score to indicate clinically significant functional improvement  Patient will report 50% overall perceived improvement  Patient will demonstrate independence and compliance with HEP  Plan     2-3x/week x 12 weeks    Dalila Marquez, PT

## 2024-10-28 ENCOUNTER — CLINICAL SUPPORT (OUTPATIENT)
Dept: REHABILITATION | Facility: HOSPITAL | Age: 64
End: 2024-10-28
Payer: MEDICARE

## 2024-10-28 DIAGNOSIS — G89.29 CHRONIC BILATERAL LOW BACK PAIN WITH LEFT-SIDED SCIATICA: Primary | ICD-10-CM

## 2024-10-28 DIAGNOSIS — M54.42 CHRONIC BILATERAL LOW BACK PAIN WITH LEFT-SIDED SCIATICA: Primary | ICD-10-CM

## 2024-10-28 PROCEDURE — 97530 THERAPEUTIC ACTIVITIES: CPT

## 2024-10-28 PROCEDURE — 97010 HOT OR COLD PACKS THERAPY: CPT

## 2024-10-28 PROCEDURE — 97110 THERAPEUTIC EXERCISES: CPT

## 2024-10-30 ENCOUNTER — CLINICAL SUPPORT (OUTPATIENT)
Dept: REHABILITATION | Facility: HOSPITAL | Age: 64
End: 2024-10-30
Payer: MEDICARE

## 2024-10-30 DIAGNOSIS — Z96.642 HISTORY OF LEFT HIP REPLACEMENT: ICD-10-CM

## 2024-10-30 DIAGNOSIS — M54.42 CHRONIC BILATERAL LOW BACK PAIN WITH LEFT-SIDED SCIATICA: Primary | ICD-10-CM

## 2024-10-30 DIAGNOSIS — G89.29 CHRONIC BILATERAL LOW BACK PAIN WITH LEFT-SIDED SCIATICA: Primary | ICD-10-CM

## 2024-10-30 PROCEDURE — 97110 THERAPEUTIC EXERCISES: CPT

## 2024-10-30 PROCEDURE — 97530 THERAPEUTIC ACTIVITIES: CPT

## 2024-11-06 ENCOUNTER — CLINICAL SUPPORT (OUTPATIENT)
Dept: REHABILITATION | Facility: HOSPITAL | Age: 64
End: 2024-11-06
Payer: MEDICARE

## 2024-11-06 DIAGNOSIS — M54.42 CHRONIC BILATERAL LOW BACK PAIN WITH LEFT-SIDED SCIATICA: Primary | ICD-10-CM

## 2024-11-06 DIAGNOSIS — G89.29 CHRONIC BILATERAL LOW BACK PAIN WITH LEFT-SIDED SCIATICA: Primary | ICD-10-CM

## 2024-11-06 PROCEDURE — 97530 THERAPEUTIC ACTIVITIES: CPT

## 2024-11-06 PROCEDURE — 97110 THERAPEUTIC EXERCISES: CPT

## 2024-11-06 PROCEDURE — 97010 HOT OR COLD PACKS THERAPY: CPT

## 2024-11-06 NOTE — PROGRESS NOTES
Physical Therapy Treatment Note     Name: Antonina Jaimes  Clinic Number: [id number]    Therapy Diagnosis:   Encounter Diagnosis   Name Primary?    Chronic bilateral low back pain with left-sided sciatica Yes     Physician: Zachery Ling DO    Visit Date: 11/6/2024    Physician Orders: PT Eval and Treat  Medical Diagnosis from Referral: low back pain w B sciatica  Evaluation Date: 10/14/2024  Authorization Period Expiration: medically necessary  Plan of Care Expiration: 1/14/2025  Visit # / Visits authorized: 7/ medically necessary     Time In: 1245  Time Out: 145  Total Appointment Time (timed & untimed codes): 60 minutes       Surgery: L MILADIS January 2024  Orthopedic Precautions: h/o LBP  Pertinent History: MILADIS in January of this year following fall    Subjective     Patient reports: she is waiting on appointment with MD and wants to continue therapy while waiting for appointment.    CMS Impairment/Limitation/Restriction for FOTO Survey  Therapist reviewed FOTO scores for Antonina Jaimes on 10/14/2024. FOTO documents entered into [employer] - see Media section.  Patient's Physical FS Primary Measure: 37 (28 on 10/30)  Risk Adjusted Statistical FOTO: 53  Limitation Score: 63%  Category: Mobility  MDQ: 54% disability (66% on 10/30)    Objective     Eval 10/14/2025 Progress note 10/30/2024   Pain:     8/10 now at low back and left leg Pain    9/10 at mid and low back, no left leg pain   Posture:      Flexed posture at her trunk and hips.    Gait Analysis:      Patient ambulates w antalgic gait pattern left and uses SC for support.  Decreased WB thru left LE.       Palpation     Pain at L1 left     Decreased mobility of lumbar vertebrae noted w PA glide attempts       Dermatomes     Numbness and pain at left heel.       Myotomes        HIP FLX - Right 5/5, Left 3/5  HIP EXT - Right 3/5, Left 2/5     HIP ABD - Right 4/5, Left 3-/5  HIP ADD - Right 4/5, Left 3/5  KNEE FLX - Right 3-/5, Left 3-/5  KNEE EXT - Right 5/5,  Left 5/5  ANKLE DF - Right 4/5, Left 4-/5  ANKLE PF - Right 3+/5, Left 3+/5          Hip/SI Clearance     JENNY: 25% left  FADDIR: neg B     SACRAL DISTRACTION: neg B  SACRAL THIGH THRUST: neg B  SACRAL THRUST: neg B  SACRAL COMPRESSION: NT    Hip/SI Clearance     JENNY: 50% left  FADDIR: neg B   AROM     Flexion: 50%  Extension: unable             Flexibility     Jaime Test: mod restr B hip flexors  Rectus femorus: mod restr B  90/90 Hamstrin L, 50 R  Piriformis Test: mod restr L       Functional Testing     5x STS = unable     Sit to stand = mod max difficulty with 25% WB thru left LE, UE support t/o  Bed mobility = mod to max difficulty  Transitional movements = mod to max difficulty     Balance     Tandem R forward = unable  Tandem L forward = unable     SLS = 10 sec R w UE support mod difficulty  SLS = 10 sec L w B UE support max difficulty Functional Testing     5x STS = 25 sec     Sit to stand = mod difficulty with 75% WB thru left LE, UE support t/o  Bed mobility = mod to max difficulty  Transitional movements = mod to max difficulty         Antonina received the following treatment:     Time Activities   Manual  min    TherAct 35 min Steps and standing activity   TherEx 20 min Muscle balance restoration at left hip girdle   Gait     Neuro Re-ed     Modalities 10 min MH prior to exercise   E-Stim     Dry Needling     Canalith Repositioning       Charges reflect one on one with patient.    Home Exercises Provided and Patient Education Provided     Education provided:   -Plan of care, HEP, safety w walking and need for AD    Assessment   Exercise focus on sacral mobilization w muscle energy techniques, strengthening of left hip and muscle balance restoration throughout low back and hip girdle with improved response. She was able to advance standing activity and step ups with increased ease and tolerance.    Seated exercise tolerated better today. Supine and prone continues to be difficult.    Will  continue therapy at this time and progress functional activity tolerance as able.    Patient prognosis is Fair.      Anticipated barriers to physical therapy: chronic multiple pain complaints and dysfunctions.    Goals: Antonina Is progressing well towards her goals.  Short Term Goals: 6 weeks   Patient will report at least 10% disability reduction from initial MDQ score to indicate clinically significant functional improvement  Patient will report at least 10% increase on initial FOTO score to indicate clinically significant functional improvement  Patient will report 50% pain reduction     Long Term Goals: 12 weeks   Patient will report at least 20% disability reduction from initial MDQ score to indicate clinically significant functional improvement  Patient will report at least 20% increase on initial FOTO score to indicate clinically significant functional improvement  Patient will report 50% overall perceived improvement  Patient will demonstrate independence and compliance with HEP  Plan     2-3x/week x 12 weeks    Dalila Marquez, PT

## 2024-11-11 ENCOUNTER — CLINICAL SUPPORT (OUTPATIENT)
Dept: REHABILITATION | Facility: HOSPITAL | Age: 64
End: 2024-11-11
Payer: MEDICARE

## 2024-11-11 DIAGNOSIS — Z96.642 HISTORY OF LEFT HIP REPLACEMENT: ICD-10-CM

## 2024-11-11 DIAGNOSIS — G89.29 CHRONIC BILATERAL LOW BACK PAIN WITH LEFT-SIDED SCIATICA: Primary | ICD-10-CM

## 2024-11-11 DIAGNOSIS — M54.42 CHRONIC BILATERAL LOW BACK PAIN WITH LEFT-SIDED SCIATICA: Primary | ICD-10-CM

## 2024-11-11 PROCEDURE — 97530 THERAPEUTIC ACTIVITIES: CPT

## 2024-11-11 PROCEDURE — 97010 HOT OR COLD PACKS THERAPY: CPT

## 2024-11-11 PROCEDURE — 97110 THERAPEUTIC EXERCISES: CPT

## 2024-11-11 NOTE — PROGRESS NOTES
Physical Therapy Treatment Note     Name: Antonina Jaimes  Clinic Number: [id number]    Therapy Diagnosis:   Encounter Diagnoses   Name Primary?    Chronic bilateral low back pain with left-sided sciatica Yes    History of left hip replacement      Physician: Zachery Ling DO    Visit Date: 11/11/2024    Physician Orders: PT Eval and Treat  Medical Diagnosis from Referral: low back pain w B sciatica  Evaluation Date: 10/14/2024  Authorization Period Expiration: medically necessary  Plan of Care Expiration: 1/14/2025  Visit # / Visits authorized: 8/ medically necessary     Time In: 1245  Time Out: 141  Total Appointment Time (timed & untimed codes): 56 minutes       Surgery: L MILADIS January 2024  Orthopedic Precautions: h/o LBP  Pertinent History: MILADIS in January of this year following fall    Subjective     Patient reports: she is encouraged because she notices a little improvement. About 10% overall improvement since start of therapy.    CMS Impairment/Limitation/Restriction for FOTO Survey  Therapist reviewed FOTO scores for Antonina Jaimes on 10/14/2024. FOTO documents entered into [employer] - see Media section.  Patient's Physical FS Primary Measure: 37 (28 on 10/30)  Risk Adjusted Statistical FOTO: 53  Limitation Score: 63%  Category: Mobility  MDQ: 54% disability (66% on 10/30)    Objective     Eval 10/14/2025 Progress note 10/30/2024   Pain:     8/10 now at low back and left leg Pain    9/10 at mid and low back, no left leg pain   Posture:      Flexed posture at her trunk and hips.    Gait Analysis:      Patient ambulates w antalgic gait pattern left and uses SC for support.  Decreased WB thru left LE.       Palpation     Pain at L1 left     Decreased mobility of lumbar vertebrae noted w PA glide attempts       Dermatomes     Numbness and pain at left heel.       Myotomes        HIP FLX - Right 5/5, Left 3/5  HIP EXT - Right 3/5, Left 2/5     HIP ABD - Right 4/5, Left 3-/5  HIP ADD - Right 4/5, Left  3/5  KNEE FLX - Right 3-/5, Left 3-/5  KNEE EXT - Right 5/5, Left 5/5  ANKLE DF - Right 4/5, Left 4-/5  ANKLE PF - Right 3+/5, Left 3+/5          Hip/SI Clearance     JENNY: 25% left  FADDIR: neg B     SACRAL DISTRACTION: neg B  SACRAL THIGH THRUST: neg B  SACRAL THRUST: neg B  SACRAL COMPRESSION: NT    Hip/SI Clearance     JENNY: 50% left  FADDIR: neg B   AROM     Flexion: 50%  Extension: unable             Flexibility     Jaime Test: mod restr B hip flexors  Rectus femorus: mod restr B  90/90 Hamstrin L, 50 R  Piriformis Test: mod restr L       Functional Testing     5x STS = unable     Sit to stand = mod max difficulty with 25% WB thru left LE, UE support t/o  Bed mobility = mod to max difficulty  Transitional movements = mod to max difficulty     Balance     Tandem R forward = unable  Tandem L forward = unable     SLS = 10 sec R w UE support mod difficulty  SLS = 10 sec L w B UE support max difficulty Functional Testing     5x STS = 25 sec     Sit to stand = mod difficulty with 75% WB thru left LE, UE support t/o  Bed mobility = mod to max difficulty  Transitional movements = mod to max difficulty         Antonina received the following treatment:     Time Activities   Manual  min    TherAct 26 min Steps and standing activity   TherEx 20 min Muscle balance restoration at left hip girdle   Gait     Neuro Re-ed     Modalities 10 min MH prior to exercise   E-Stim     Dry Needling     Canalith Repositioning       Charges reflect one on one with patient.    Home Exercises Provided and Patient Education Provided     Education provided:   -Plan of care, HEP, safety w walking and need for AD    Assessment   Exercise focus on sacral mobilization w muscle energy techniques, strengthening of left hip and muscle balance restoration throughout low back and hip girdle with improved response. She was able to advance standing activity and step ups with increased ease and tolerance. Able to begin proprioceptive retraining  today and improved step ups.    Seated exercise tolerated better today. Supine and prone continues to be difficult.    Encouraged left rectus stretch laying on sofa at home. Patient in agreement.    Much improved session today.  Patient encouraged.    Will continue therapy at this time and progress functional activity tolerance as able.    Patient prognosis is Fair.      Anticipated barriers to physical therapy: chronic multiple pain complaints and dysfunctions.    Goals: Antonina Is progressing well towards her goals.  Short Term Goals: 6 weeks   Patient will report at least 10% disability reduction from initial MDQ score to indicate clinically significant functional improvement  Patient will report at least 10% increase on initial FOTO score to indicate clinically significant functional improvement  Patient will report 50% pain reduction     Long Term Goals: 12 weeks   Patient will report at least 20% disability reduction from initial MDQ score to indicate clinically significant functional improvement  Patient will report at least 20% increase on initial FOTO score to indicate clinically significant functional improvement  Patient will report 50% overall perceived improvement  Patient will demonstrate independence and compliance with HEP  Plan     2-3x/week x 12 weeks    Dalila Marquez, PT

## 2024-11-19 ENCOUNTER — CLINICAL SUPPORT (OUTPATIENT)
Dept: REHABILITATION | Facility: HOSPITAL | Age: 64
End: 2024-11-19
Payer: MEDICARE

## 2024-11-19 DIAGNOSIS — Z96.642 HISTORY OF LEFT HIP REPLACEMENT: ICD-10-CM

## 2024-11-19 DIAGNOSIS — G89.29 CHRONIC BILATERAL LOW BACK PAIN WITH LEFT-SIDED SCIATICA: Primary | ICD-10-CM

## 2024-11-19 DIAGNOSIS — M54.42 CHRONIC BILATERAL LOW BACK PAIN WITH LEFT-SIDED SCIATICA: Primary | ICD-10-CM

## 2024-11-19 PROCEDURE — 97110 THERAPEUTIC EXERCISES: CPT

## 2024-11-19 PROCEDURE — 97530 THERAPEUTIC ACTIVITIES: CPT

## 2024-11-19 NOTE — PROGRESS NOTES
Physical Therapy Treatment Note     Name: Antonina Jaimes  Clinic Number: [id number]    Therapy Diagnosis:   Encounter Diagnoses   Name Primary?    Chronic bilateral low back pain with left-sided sciatica Yes    History of left hip replacement      Physician: Zachery Ling DO    Visit Date: 11/19/2024    Physician Orders: PT Eval and Treat  Medical Diagnosis from Referral: low back pain w B sciatica  Evaluation Date: 10/14/2024  Authorization Period Expiration: medically necessary  Plan of Care Expiration: 1/14/2025  Visit # / Visits authorized: 9/ medically necessary     Time In: 1245  Time Out: 140  Total Appointment Time (timed & untimed codes): 55 minutes       Surgery: L MILADIS January 2024  Orthopedic Precautions: h/o LBP  Pertinent History: MILADIS in January of this year following fall    Subjective     Patient reports: she has had numbness in her left foot since last session.  She did miss a therapy session due to transportation and heater issues.  She is requesting prone positions again because she thinks that helped her back and she feels like she could tolerate the position today.    CMS Impairment/Limitation/Restriction for FOTO Survey  Therapist reviewed FOTO scores for Antonina Jaimes on 10/14/2024. FOTO documents entered into [employer] - see Media section.  Patient's Physical FS Primary Measure: 37 (28 on 10/30)  Risk Adjusted Statistical FOTO: 53  Limitation Score: 63%  Category: Mobility  MDQ: 54% disability (66% on 10/30)    Objective     Eval 10/14/2025 Progress note 10/30/2024   Pain:     8/10 now at low back and left leg Pain    9/10 at mid and low back, no left leg pain   Posture:      Flexed posture at her trunk and hips.    Gait Analysis:      Patient ambulates w antalgic gait pattern left and uses SC for support.  Decreased WB thru left LE.       Palpation     Pain at L1 left     Decreased mobility of lumbar vertebrae noted w PA glide attempts       Dermatomes     Numbness and pain at left  heel.       Myotomes        HIP FLX - Right 5/5, Left 3/5  HIP EXT - Right 3/5, Left 2/5     HIP ABD - Right 4/5, Left 3-/5  HIP ADD - Right 4/5, Left 3/5  KNEE FLX - Right 3-/5, Left 3-/5  KNEE EXT - Right 5/5, Left 5/5  ANKLE DF - Right 4/5, Left 4-/5  ANKLE PF - Right 3+/5, Left 3+/5          Hip/SI Clearance     JENNY: 25% left  FADDIR: neg B     SACRAL DISTRACTION: neg B  SACRAL THIGH THRUST: neg B  SACRAL THRUST: neg B  SACRAL COMPRESSION: NT    Hip/SI Clearance     JENNY: 50% left  FADDIR: neg B   AROM     Flexion: 50%  Extension: unable             Flexibility     Jaime Test: mod restr B hip flexors  Rectus femorus: mod restr B  90/90 Hamstrin L, 50 R  Piriformis Test: mod restr L       Functional Testing     5x STS = unable     Sit to stand = mod max difficulty with 25% WB thru left LE, UE support t/o  Bed mobility = mod to max difficulty  Transitional movements = mod to max difficulty     Balance     Tandem R forward = unable  Tandem L forward = unable     SLS = 10 sec R w UE support mod difficulty  SLS = 10 sec L w B UE support max difficulty Functional Testing     5x STS = 25 sec     Sit to stand = mod difficulty with 75% WB thru left LE, UE support t/o  Bed mobility = mod to max difficulty  Transitional movements = mod to max difficulty         Antonina received the following treatment:     Time Activities   Manual  min    TherAct 25 min Steps and standing activity   TherEx 20 min Muscle balance restoration at left hip girdle   Gait     Neuro Re-ed     Modalities 10 min MH prior to exercise   E-Stim     Dry Needling     Canalith Repositioning       Charges reflect one on one with patient.    Home Exercises Provided and Patient Education Provided     Education provided:   -Plan of care, HEP, safety w walking and need for AD    Assessment   Exercise focus on sacral mobilization w muscle energy techniques, strengthening of left hip and muscle balance restoration throughout low back and hip girdle with  good response. She was able to tolerate prone positioning with reports of decreased symptoms in left foot. Was able to advance standing activity and step ups with increased ease and tolerance. Continued proprioceptive retraining today and improved step ups.    Much improved session today.  Patient encouraged. She does have persistent tingling in left foot after session.    Will continue therapy at this time and progress functional activity tolerance as able.    Patient prognosis is Fair.      Anticipated barriers to physical therapy: chronic multiple pain complaints and dysfunctions.    Goals: Antonina Is progressing well towards her goals.  Short Term Goals: 6 weeks   Patient will report at least 10% disability reduction from initial MDQ score to indicate clinically significant functional improvement  Patient will report at least 10% increase on initial FOTO score to indicate clinically significant functional improvement  Patient will report 50% pain reduction     Long Term Goals: 12 weeks   Patient will report at least 20% disability reduction from initial MDQ score to indicate clinically significant functional improvement  Patient will report at least 20% increase on initial FOTO score to indicate clinically significant functional improvement  Patient will report 50% overall perceived improvement  Patient will demonstrate independence and compliance with HEP  Plan     2-3x/week x 12 weeks    Dalila Marquez, PT

## 2024-11-20 ENCOUNTER — TELEPHONE (OUTPATIENT)
Dept: FAMILY MEDICINE | Facility: CLINIC | Age: 64
End: 2024-11-20
Payer: MEDICARE

## 2024-11-21 ENCOUNTER — CLINICAL SUPPORT (OUTPATIENT)
Dept: REHABILITATION | Facility: HOSPITAL | Age: 64
End: 2024-11-21
Payer: MEDICARE

## 2024-11-21 DIAGNOSIS — M54.42 CHRONIC BILATERAL LOW BACK PAIN WITH LEFT-SIDED SCIATICA: Primary | ICD-10-CM

## 2024-11-21 DIAGNOSIS — Z96.642 HISTORY OF LEFT HIP REPLACEMENT: ICD-10-CM

## 2024-11-21 DIAGNOSIS — G89.29 CHRONIC BILATERAL LOW BACK PAIN WITH LEFT-SIDED SCIATICA: Primary | ICD-10-CM

## 2024-11-21 PROCEDURE — 97530 THERAPEUTIC ACTIVITIES: CPT | Mod: KX

## 2024-11-21 PROCEDURE — 97110 THERAPEUTIC EXERCISES: CPT | Mod: KX

## 2024-11-21 NOTE — PROGRESS NOTES
Physical Therapy Discharge Note     Name: Antonina Jaimes  Clinic Number: [id number]    Therapy Diagnosis:   Encounter Diagnoses   Name Primary?    Chronic bilateral low back pain with left-sided sciatica Yes    History of left hip replacement      Physician: Zachery Ling DO    Visit Date: 11/21/2024    Physician Orders: PT Eval and Treat  Medical Diagnosis from Referral: low back pain w B sciatica  Evaluation Date: 10/14/2024  Authorization Period Expiration: medically necessary  Plan of Care Expiration: 1/14/2025  Visit # / Visits authorized: 10/ medically necessary     Time In: 1245  Time Out: 154  Total Appointment Time (timed & untimed codes): 69 minutes       Surgery: L MILADIS January 2024  Orthopedic Precautions: h/o LBP  Pertinent History: MILADIS in January of this year following fall    Subjective     Patient reports: she still has some numbness at her left foot and toe.  She does report 10% overall perceived improvement since start of care.    Patient voices several concerns regarding transportation difficulties, illness in family, difficult living conditions including no hot water available and concern regarding multiple medical issues. She continues to have elevated pain levels at and difficulty with mobility.        CMS Impairment/Limitation/Restriction for FOTO Survey  Therapist reviewed FOTO scores for Antonina Jaimes on 10/14/2024. FOTO documents entered into EPIC - see Media section.  Patient's Physical FS Primary Measure: 37 (28 on 10/30) (31 on 11/21)  Risk Adjusted Statistical FOTO: 53  Limitation Score: 63%  Category: Mobility  MDQ: 54% disability (66% on 10/30) (62.0% on 11/21)    Objective     Eval 10/14/2025 Progress note 10/30/2024 Discharge 11/21/2024   Pain:     8/10 now at low back and left leg Pain    9/10 at mid and low back, no left leg pain Pain    8/10 at left foot   Posture:      Flexed posture at her trunk and hips.     Gait Analysis:      Patient ambulates w antalgic gait  pattern left and uses SC for support.  Decreased WB thru left LE.        Palpation     Pain at L1 left     Decreased mobility of lumbar vertebrae noted w PA glide attempts        Dermatomes     Numbness and pain at left heel.        Myotomes        HIP FLX - Right 5/5, Left 3/5  HIP EXT - Right 3/5, Left 2/5     HIP ABD - Right 4/5, Left 3-/5  HIP ADD - Right 4/5, Left 3/5  KNEE FLX - Right 3-/5, Left 3-/5  KNEE EXT - Right 5/5, Left 5/5  ANKLE DF - Right 4/5, Left 4-/5  ANKLE PF - Right 3+/5, Left 3+/5        Myotomes        HIP FLX - Right 5/5, Left 3/5  HIP EXT - Right 3/5, Left 3-/5     HIP ABD - Right 4/5, Left 3-/5  HIP ADD - Right 4/5, Left 3/5  KNEE FLX - Right 4/5, Left 4-/5  KNEE EXT - Right 5/5, Left 5/5  ANKLE DF - Right 4/5, Left 4-/5  ANKLE PF - Right 3+/5, Left 3+/5   Hip/SI Clearance     JENNY: 25% left  FADDIR: neg B     SACRAL DISTRACTION: neg B  SACRAL THIGH THRUST: neg B  SACRAL THRUST: neg B  SACRAL COMPRESSION: NT    Hip/SI Clearance     JENNY: 50% left  FADDIR: neg B    AROM     Flexion: 50%  Extension: unable              Flexibility     Jaime Test: mod restr B hip flexors  Rectus femorus: mod restr B  90/90 Hamstrin L, 50 R  Piriformis Test: mod restr L     Flexibility     Jaime Test: mod restr L hip flexors  Rectus femorus: mod restr L  90/90 Hamstrin L, 60 R  Piriformis Test: mod restr L   Functional Testing     5x STS = unable     Sit to stand = mod max difficulty with 25% WB thru left LE, UE support t/o  Bed mobility = mod to max difficulty  Transitional movements = mod to max difficulty     Balance     Tandem R forward = unable  Tandem L forward = unable     SLS = 10 sec R w UE support mod difficulty  SLS = 10 sec L w B UE support max difficulty Functional Testing     5x STS = 25 sec     Sit to stand = mod difficulty with 75% WB thru left LE, UE support t/o  Bed mobility = mod to max difficulty  Transitional movements = mod to max difficulty     Functional Testing     5x STS  = 27 sec     Sit to stand = mod difficulty with 75% WB thru left LE, UE support for push off  Bed mobility = mod to max difficulty  Transitional movements = mod to max difficulty     Antonina received the following treatment:     Time Activities   Manual  min    TherAct 39 min Steps and standing activity   TherEx 20 min Muscle balance restoration at left hip girdle   Gait     Neuro Re-ed     Modalities 10 min MH prior to exercise   E-Stim     Dry Needling     Canalith Repositioning       Charges reflect one on one with patient.    Home Exercises Provided and Patient Education Provided     Education provided:   -Plan of care, HEP, safety w walking and need for AD    Assessment   Patient has completed 10 visits with focus on low back and left hip muscle balance restoration w fair response.  She is demonstrating improved flexibility and strength at left hip and improved lumbar mobility.  She has increased activity tolerance and is compliant with HEP.    Patient does have persistent difficulty with functional mobility and elevated pain levels.  She has inconsistent improvements and only 10% overall perceived improvements at this time.     Her left hip is responding nicely to therapy with improved flexibility and strength.  Her left knee is no longer hurting her and her quad is recruiting with good patella tracking.  She does have persistent pain at her low back and left LE neuro tension with numbness in her foot that comes and goes.     She is having difficulty coordinating transportation and has multiple personal issues and hardships going on right now that are limiting her therapy tolerance and participation.    Issued HEP and encouraged to return to MD for guidance with plan going forward.  Do feel that patient would benefit from an MRI of her lumbar region at this time.    Goals partially met at this time.     Patient prognosis is Fair.      Anticipated barriers to physical therapy: chronic multiple pain complaints  and dysfunctions.    Goals: Antonina Is progressing well towards her goals.  Short Term Goals: 6 weeks   Patient will report at least 10% disability reduction from initial MDQ score to indicate clinically significant functional improvement MET  Patient will report at least 10% increase on initial FOTO score to indicate clinically significant functional improvement MET  Patient will report 50% pain reduction NOT MET     Long Term Goals: 12 weeks   Patient will report at least 20% disability reduction from initial MDQ score to indicate clinically significant functional improvement PARTIALLY MET  Patient will report at least 20% increase on initial FOTO score to indicate clinically significant functional improvement PARTIALLY MET  Patient will report 50% overall perceived improvement NOT MET  Patient will demonstrate independence and compliance with HEP MET  Plan     Discharge    Dalila Marquez, PT

## 2024-11-22 DIAGNOSIS — E78.5 HYPERLIPIDEMIA, UNSPECIFIED HYPERLIPIDEMIA TYPE: ICD-10-CM

## 2024-11-22 RX ORDER — SIMVASTATIN 20 MG/1
TABLET, FILM COATED ORAL
Qty: 30 TABLET | Refills: 11 | Status: SHIPPED | OUTPATIENT
Start: 2024-11-22

## 2024-12-11 ENCOUNTER — HOSPITAL ENCOUNTER (OUTPATIENT)
Dept: RADIOLOGY | Facility: HOSPITAL | Age: 64
Discharge: HOME | End: 2024-12-11
Attending: NURSE PRACTITIONER
Payer: MEDICARE

## 2024-12-11 DIAGNOSIS — I50.9 CHF (CONGESTIVE HEART FAILURE): ICD-10-CM

## 2024-12-11 DIAGNOSIS — I50.22 CHRONIC SYSTOLIC HEART FAILURE: ICD-10-CM

## 2024-12-11 DIAGNOSIS — I65.23 BILATERAL CAROTID ARTERY STENOSIS: ICD-10-CM

## 2024-12-11 PROCEDURE — 93306 TTE W/DOPPLER COMPLETE: CPT

## 2024-12-11 PROCEDURE — 93880 EXTRACRANIAL BILAT STUDY: CPT | Mod: TC

## 2024-12-12 LAB
AORTIC ROOT ANNULUS: 3.01 CM
AORTIC VALVE CUSP SEPERATION: 1.74 CM
AV PEAK GRADIENT: 6.8 MMHG
CV ECHO LV RWT: 0.5 CM
DOP CALC AO PEAK VEL: 1.3 M/S
E WAVE DECELERATION TIME: 196.45 MSEC
E/A RATIO: 1.16
ECHO LV POSTERIOR WALL: 1 CM (ref 0.6–1.1)
FRACTIONAL SHORTENING: 32.5 % (ref 28–44)
INTERVENTRICULAR SEPTUM: 1 CM (ref 0.6–1.1)
LEFT INTERNAL DIMENSION IN SYSTOLE: 2.7 CM (ref 2.1–4)
LEFT VENTRICLE DIASTOLIC VOLUME: 69.02 ML
LEFT VENTRICLE SYSTOLIC VOLUME: 26.34 ML
LEFT VENTRICULAR INTERNAL DIMENSION IN DIASTOLE: 4 CM (ref 3.5–6)
LEFT VENTRICULAR MASS: 127.1 G
LVED V (TEICH): 69.02 ML
LVES V (TEICH): 26.34 ML
MV PEAK A VEL: 0.8 M/S
MV PEAK E VEL: 0.93 M/S
MV STENOSIS PRESSURE HALF TIME: 56.97 MS
MV VALVE AREA P 1/2 METHOD: 3.86 CM2
OHS LV EJECTION FRACTION SIMPSONS BIPLANE MOD: 61 %
PISA MRMAX VEL: 3.9 M/S
PV PEAK GRADIENT: 2 MMHG
PV PEAK VELOCITY: 0.77 M/S
RA PRESSURE ESTIMATED: 3 MMHG
RIGHT VENTRICULAR END-DIASTOLIC DIMENSION: 2.29 CM
TRICUSPID VALVE PEAK A WAVE VELOCITY: 0.52 M/S
TV PEAK E VEL: 0.6 M/S
TV STENOSIS PRESSURE HALF TIME: 62.11 MS
TV VALVE AREA P 1/2 METHOD: 3.06 CM2

## 2024-12-17 ENCOUNTER — PATIENT MESSAGE (OUTPATIENT)
Facility: CLINIC | Age: 64
End: 2024-12-17
Payer: MEDICARE

## 2024-12-30 ENCOUNTER — HOSPITAL ENCOUNTER (EMERGENCY)
Facility: HOSPITAL | Age: 64
Discharge: HOME | End: 2024-12-30
Attending: FAMILY MEDICINE
Payer: MEDICARE

## 2024-12-30 VITALS
RESPIRATION RATE: 18 BRPM | OXYGEN SATURATION: 97 % | TEMPERATURE: 98 F | DIASTOLIC BLOOD PRESSURE: 99 MMHG | SYSTOLIC BLOOD PRESSURE: 178 MMHG | WEIGHT: 120 LBS | HEART RATE: 77 BPM | BODY MASS INDEX: 21.26 KG/M2 | HEIGHT: 63 IN

## 2024-12-30 DIAGNOSIS — I10 HYPERTENSION, UNSPECIFIED TYPE: ICD-10-CM

## 2024-12-30 DIAGNOSIS — R07.9 CHEST PAIN: ICD-10-CM

## 2024-12-30 DIAGNOSIS — R07.89 CHEST WALL PAIN: Primary | ICD-10-CM

## 2024-12-30 LAB
ALBUMIN SERPL-MCNC: 4.6 G/DL (ref 3.4–4.8)
ALBUMIN/GLOB SERPL: 1.3 RATIO (ref 1.1–2)
ALP SERPL-CCNC: 75 UNIT/L (ref 40–150)
ALT SERPL-CCNC: 15 UNIT/L (ref 0–55)
ANION GAP SERPL CALC-SCNC: 9 MEQ/L
AST SERPL-CCNC: 24 UNIT/L (ref 5–34)
BASOPHILS # BLD AUTO: 0.03 X10(3)/MCL
BASOPHILS NFR BLD AUTO: 0.5 %
BILIRUB SERPL-MCNC: 0.5 MG/DL
BUN SERPL-MCNC: 5 MG/DL (ref 9.8–20.1)
CALCIUM SERPL-MCNC: 10.4 MG/DL (ref 8.4–10.2)
CHLORIDE SERPL-SCNC: 102 MMOL/L (ref 98–107)
CO2 SERPL-SCNC: 29 MMOL/L (ref 23–31)
CREAT SERPL-MCNC: 0.67 MG/DL (ref 0.55–1.02)
CREAT/UREA NIT SERPL: 7
EOSINOPHIL # BLD AUTO: 0.11 X10(3)/MCL (ref 0–0.9)
EOSINOPHIL NFR BLD AUTO: 1.7 %
ERYTHROCYTE [DISTWIDTH] IN BLOOD BY AUTOMATED COUNT: 11.9 % (ref 11.5–17)
GFR SERPLBLD CREATININE-BSD FMLA CKD-EPI: >60 ML/MIN/1.73/M2
GLOBULIN SER-MCNC: 3.6 GM/DL (ref 2.4–3.5)
GLUCOSE SERPL-MCNC: 105 MG/DL (ref 82–115)
HCT VFR BLD AUTO: 44.2 % (ref 37–47)
HGB BLD-MCNC: 14.6 G/DL (ref 12–16)
IMM GRANULOCYTES # BLD AUTO: 0.01 X10(3)/MCL (ref 0–0.04)
IMM GRANULOCYTES NFR BLD AUTO: 0.2 %
LYMPHOCYTES # BLD AUTO: 1.92 X10(3)/MCL (ref 0.6–4.6)
LYMPHOCYTES NFR BLD AUTO: 29 %
MCH RBC QN AUTO: 29.4 PG (ref 27–31)
MCHC RBC AUTO-ENTMCNC: 33 G/DL (ref 33–36)
MCV RBC AUTO: 88.9 FL (ref 80–94)
MONOCYTES # BLD AUTO: 0.39 X10(3)/MCL (ref 0.1–1.3)
MONOCYTES NFR BLD AUTO: 5.9 %
NEUTROPHILS # BLD AUTO: 4.17 X10(3)/MCL (ref 2.1–9.2)
NEUTROPHILS NFR BLD AUTO: 62.7 %
NRBC BLD AUTO-RTO: 0 %
OHS QRS DURATION: 68 MS
OHS QTC CALCULATION: 410 MS
PLATELET # BLD AUTO: 301 X10(3)/MCL (ref 130–400)
PMV BLD AUTO: 8.9 FL (ref 7.4–10.4)
POTASSIUM SERPL-SCNC: 3.9 MMOL/L (ref 3.5–5.1)
PROT SERPL-MCNC: 8.2 GM/DL (ref 5.8–7.6)
RBC # BLD AUTO: 4.97 X10(6)/MCL (ref 4.2–5.4)
SODIUM SERPL-SCNC: 140 MMOL/L (ref 136–145)
TROPONIN I SERPL-MCNC: <0.01 NG/ML (ref 0–0.04)
WBC # BLD AUTO: 6.63 X10(3)/MCL (ref 4.5–11.5)

## 2024-12-30 PROCEDURE — 99285 EMERGENCY DEPT VISIT HI MDM: CPT | Mod: 25

## 2024-12-30 PROCEDURE — 85025 COMPLETE CBC W/AUTO DIFF WBC: CPT | Performed by: FAMILY MEDICINE

## 2024-12-30 PROCEDURE — 93005 ELECTROCARDIOGRAM TRACING: CPT

## 2024-12-30 PROCEDURE — 25000003 PHARM REV CODE 250: Performed by: FAMILY MEDICINE

## 2024-12-30 PROCEDURE — 84484 ASSAY OF TROPONIN QUANT: CPT | Performed by: FAMILY MEDICINE

## 2024-12-30 PROCEDURE — 96372 THER/PROPH/DIAG INJ SC/IM: CPT | Performed by: FAMILY MEDICINE

## 2024-12-30 PROCEDURE — 63600175 PHARM REV CODE 636 W HCPCS: Performed by: FAMILY MEDICINE

## 2024-12-30 PROCEDURE — 80053 COMPREHEN METABOLIC PANEL: CPT | Performed by: FAMILY MEDICINE

## 2024-12-30 RX ORDER — TRAMADOL HYDROCHLORIDE 50 MG/1
50 TABLET ORAL EVERY 6 HOURS PRN
Qty: 12 TABLET | Refills: 0 | Status: SHIPPED | OUTPATIENT
Start: 2024-12-30 | End: 2025-01-02 | Stop reason: SDUPTHER

## 2024-12-30 RX ORDER — CLONIDINE HYDROCHLORIDE 0.1 MG/1
0.1 TABLET ORAL
Status: COMPLETED | OUTPATIENT
Start: 2024-12-30 | End: 2024-12-30

## 2024-12-30 RX ORDER — KETOROLAC TROMETHAMINE 30 MG/ML
60 INJECTION, SOLUTION INTRAMUSCULAR; INTRAVENOUS
Status: COMPLETED | OUTPATIENT
Start: 2024-12-30 | End: 2024-12-30

## 2024-12-30 RX ORDER — CYCLOBENZAPRINE HCL 5 MG
5 TABLET ORAL 3 TIMES DAILY PRN
Qty: 15 TABLET | Refills: 0 | Status: SHIPPED | OUTPATIENT
Start: 2024-12-30 | End: 2025-01-02 | Stop reason: SDUPTHER

## 2024-12-30 RX ADMIN — CLONIDINE HYDROCHLORIDE 0.1 MG: 0.1 TABLET ORAL at 12:12

## 2024-12-30 RX ADMIN — KETOROLAC TROMETHAMINE 60 MG: 30 INJECTION, SOLUTION INTRAMUSCULAR at 12:12

## 2024-12-30 NOTE — ED NOTES
Pt ambulated out of room after getting herself dressed w/ cane. Pt declined wheel chair and continued to ambulate w/cane until pt states she did need the wheel chair. I provided pt the wheel chair and walked her to the car. Pt did get into car her self successfully.

## 2024-12-30 NOTE — ED NOTES
Pt and family member asked if a MRI could be done of pts chest/back, advised that unfortunately we do not do MRIs in the ER for pts condition and that is a test a HCP as outpt would be able to order and get approval through insurance. Pts family member was not happy about this and was pacing the room. I did reassure them we do have other testing options here in the ED for imaging if the MD thought it was necessary and this is something they would discuss with the

## 2024-12-30 NOTE — DISCHARGE INSTRUCTIONS
VOLTAREN ointment is available over the counter. Apply to sore areas up to twice a day for aches and pain.

## 2024-12-30 NOTE — ED PROVIDER NOTES
Encounter Date: 12/30/2024       History     Chief Complaint   Patient presents with    Chest Pain     Left sided rib pain that starts in front with pain wrapping around to back started 10 days ago. Denies any other symptoms. EKG done.     Left chest wall pain since doing home PT for chronic back pain. Sudden sharp left CW pain, worse with any movement or deep breaths. No sob, no f/c, no rashes. Here with daughter. Hoping to get MRI to evaluate back, as this pain has been on / off since January.    The history is provided by the patient.     Review of patient's allergies indicates:  No Known Allergies  Past Medical History:   Diagnosis Date    CHF (congestive heart failure)     COPD (chronic obstructive pulmonary disease)     GERD (gastroesophageal reflux disease)     Hyperlipidemia     Hypertension     Hypokalemia     Insomnia     Nicotine dependence     Osteoporosis      Past Surgical History:   Procedure Laterality Date    ADENOIDECTOMY      COLONOSCOPY N/A 8/8/2023    Procedure: COLONOSCOPY;  Surgeon: Mikhail Marcano MD;  Location: [facility];  Service: Endoscopy;  Laterality: N/A;  Scant ascending colon diverticular disease   sigmoid diverticular     COLONOSCOPY, WITH POLYPECTOMY USING HOT BIOPSY FORCEPS N/A 8/8/2023    Procedure: COLONOSCOPY, WITH POLYPECTOMY USING HOT BIOPSY FORCEPS;  Surgeon: Mikhail Marcano MD;  Location: [facility];  Service: Endoscopy;  Laterality: N/A;  B. Polyp at 10-15cm; no heat     COLONOSCOPY, WITH POLYPECTOMY USING SNARE N/A 8/8/2023    Procedure: COLONOSCOPY, WITH POLYPECTOMY USING SNARE;  Surgeon: Mikhail Marcano MD;  Location: [facility];  Service: Endoscopy;  Laterality: N/A;  A. appendicle polyp       EGD, WITH CLOSED BIOPSY  07/15/2023    Procedure: EGD, WITH CLOSED BIOPSY;  Surgeon: Tyree Hodges MD;  Location: [facility];  Service: Gastroenterology;;    EGD, WITH HEMORRHAGE CONTROL  07/15/2023    Procedure: EGD,WITH HEMORRHAGE CONTROL;  Surgeon: Tyree  MD Tracie;  Location: [facility];  Service: Gastroenterology;;  gold probe 7FR    ESOPHAGOGASTRODUODENOSCOPY N/A 07/15/2023    Procedure: EGD (ESOPHAGOGASTRODUODENOSCOPY);  Surgeon: Tyree Hodges MD;  Location: [facility];  Service: Gastroenterology;  Laterality: N/A;    HEMORRHOID SURGERY      HIP REPLACEMENT ARTHROPLASTY Left 1/22/2024    Procedure: ARTHROPLASTY, HIP REPLACEMENT;  Surgeon: Oleg Diaz MD;  Location: [facility] OR;  Service: Orthopedics;  Laterality: Left;    HYSTERECTOMY      TONSILLECTOMY       Family History   Problem Relation Name Age of Onset    Hypertension Mother      Diabetes Mother      Hypertension Father       Social History     Tobacco Use    Smoking status: Every Day     Current packs/day: 0.50     Average packs/day: 1 pack/day for 40.2 years (40.0 ttl pk-yrs)     Types: Cigarettes     Start date: 10/7/1984    Smokeless tobacco: Never   Substance Use Topics    Alcohol use: Yes     Alcohol/week: 2.0 standard drinks of alcohol     Types: 2 Cans of beer per week    Drug use: Never     Review of Systems   Constitutional:  Negative for fever.   HENT:  Negative for sore throat.    Respiratory:  Negative for shortness of breath.    Cardiovascular:  Positive for chest pain.   Gastrointestinal:  Negative for nausea.   Genitourinary:  Negative for dysuria.   Musculoskeletal:  Negative for back pain.   Skin:  Negative for rash.   Neurological:  Negative for weakness.   Hematological:  Does not bruise/bleed easily.   All other systems reviewed and are negative.      Physical Exam     Initial Vitals [12/30/24 1114]   BP Pulse Resp Temp SpO2   (!) 179/149 90 20 97.8 °F (36.6 °C) 98 %      MAP       --         Physical Exam    Nursing note and vitals reviewed.  Constitutional: She appears well-developed and well-nourished.   HENT:   Head: Normocephalic and atraumatic.   Eyes: EOM are normal. Pupils are equal, round, and reactive to light.   Neck: Neck supple.   Normal range of  motion.  Cardiovascular:  Normal rate, regular rhythm and normal heart sounds.           Pulmonary/Chest: Breath sounds normal. She has no wheezes. She has no rales. She exhibits tenderness (left anterior to lateral chest wall tenderness).     Tenderness on exam. No rash noted.   Abdominal: Abdomen is soft. Bowel sounds are normal. There is no abdominal tenderness.   Musculoskeletal:         General: No edema. Normal range of motion.      Cervical back: Normal range of motion and neck supple.     Neurological: She is alert and oriented to person, place, and time.   Skin: Skin is warm and dry. Capillary refill takes less than 2 seconds.   Psychiatric: She has a normal mood and affect.         ED Course   Procedures  Labs Reviewed   COMPREHENSIVE METABOLIC PANEL - Abnormal       Result Value    Sodium 140      Potassium 3.9      Chloride 102      CO2 29      Glucose 105      Blood Urea Nitrogen 5.0 (*)     Creatinine 0.67      Calcium 10.4 (*)     Protein Total 8.2 (*)     Albumin 4.6      Globulin 3.6 (*)     Albumin/Globulin Ratio 1.3      Bilirubin Total 0.5      ALP 75      ALT 15      AST 24      eGFR >60      Anion Gap 9.0      BUN/Creatinine Ratio 7     TROPONIN I - Normal    Troponin-I <0.010     CBC W/ AUTO DIFFERENTIAL    Narrative:     The following orders were created for panel order CBC Auto Differential.  Procedure                               Abnormality         Status                     ---------                               -----------         ------                     CBC with Differential[[id number]]                           Final result                 Please view results for these tests on the individual orders.   CBC WITH DIFFERENTIAL    WBC 6.63      RBC 4.97      Hgb 14.6      Hct 44.2      MCV 88.9      MCH 29.4      MCHC 33.0      RDW 11.9      Platelet 301      MPV 8.9      Neut % 62.7      Lymph % 29.0      Mono % 5.9      Eos % 1.7      Basophil % 0.5      Lymph # 1.92      Neut #  4.17      Mono # 0.39      Eos # 0.11      Baso # 0.03      IG# 0.01      IG% 0.2      NRBC% 0.0       EKG Readings: (Independently Interpreted)   NSR, nl ST, lateral T wave inv, nl QRS     ECG Results              EKG 12-lead (Final result)        Collection Time Result Time QRS Duration OHS QTC Calculation    12/30/24 11:07:16 12/30/24 12:17:56 68 410                     Final result by Interface, Lab In [facility] (12/30/24 12:18:03)                   Narrative:    Test Reason : R07.9,    Vent. Rate :  91 BPM     Atrial Rate :  91 BPM     P-R Int : 170 ms          QRS Dur :  68 ms      QT Int : 334 ms       P-R-T Axes :  71 -17  68 degrees    QTcB Int : 410 ms    Sinus rhythm with Premature atrial complexes  Nonspecific ST and T wave abnormality  Abnormal ECG  When compared with ECG of 21-Jan-2024 06:56,  Premature atrial complexes are now Present  Confirmed by Jomar Limon ([id number]) on 12/30/2024 12:17:53 PM    Referred By: AAAREFERRAL SELF           Confirmed By: Jomar Limon                                  Imaging Results              XR Ribs Min 3 views w/PA Chest Left (Final result)  Result time 12/30/24 12:50:24   Procedure changed from X-Ray Ribs 2 View Left     Final result by Jayson Granda MD (12/30/24 12:50:24)                   Narrative:    EXAMINATION  XR RIBS MIN 3 VIEWS W/ PA CHEST LEFT    CLINICAL HISTORY  pain; Chest pain, unspecified    TECHNIQUE  Total of 5 images submitted of the left ribs.    COMPARISON  21 January 2024    FINDINGS  The visualized cardiac silhouette, mediastinal structures, and lung fields are without evidence of acute or suspicious focal abnormality.  There is no significant pleural fluid or appreciable pneumothorax.    No convincing displaced rib fracture is appreciated. The remaining osseous structures are grossly intact.    IMPRESSION  1. No acute cardiopulmonary abnormality.  2. No convincing displaced rib fracture.      Electronically signed by: Jayson  Granda  Date:    12/30/2024  Time:    12:50                      Wet Read by Nico Cano MD (12/30/24 12:36:11, [facility] - [facility] Dept, Emergency Medicine)    No acute                                     Medications   cloNIDine tablet 0.1 mg (0.1 mg Oral Given 12/30/24 1249)   ketorolac injection 60 mg (60 mg Intramuscular Given 12/30/24 1249)     Medical Decision Making  Differential Dx: ACS, strained/fx rib, shingles, radicular thoracic pain, pneumonia, pneumothorax    Cxr / ribs: no fx    Sx c/w musculoskeletal pain. Will rx pain meds, muscle relaxers. Discuss scans with PCP.    BP elevated. Will tx with clonidine and toradol.        Amount and/or Complexity of Data Reviewed  Labs: ordered.     Details: Cbc, cmp, trop all unrmarkable.  Radiology: ordered and independent interpretation performed.     Details: Cxr / ribs no fx    Risk  Prescription drug management.                                      Clinical Impression:  Final diagnoses:  [R07.9] Chest pain  [R07.89] Chest wall pain (Primary)  [I10] Hypertension, unspecified type          ED Disposition Condition    Discharge Stable          ED Prescriptions       Medication Sig Dispense Start Date End Date Auth. Provider    traMADoL (ULTRAM) 50 mg tablet Take 1 tablet (50 mg total) by mouth every 6 (six) hours as needed for Pain. 12 tablet 12/30/2024 -- Nico Cano MD    cyclobenzaprine (FLEXERIL) 5 MG tablet Take 1 tablet (5 mg total) by mouth 3 (three) times daily as needed for Muscle spasms. 15 tablet 12/30/2024 1/9/2025 Nico Cano MD          Follow-up Information       Follow up With Specialties Details Why Contact Info    Zachery Ling DO Family Medicine Schedule an appointment as soon as possible for a visit   [address] [facility] [address]  [phone]               Nico Cano MD  12/30/24 [id number]

## 2025-01-02 ENCOUNTER — OFFICE VISIT (OUTPATIENT)
Dept: FAMILY MEDICINE | Facility: CLINIC | Age: 65
End: 2025-01-02
Payer: MEDICARE

## 2025-01-02 VITALS
OXYGEN SATURATION: 98 % | TEMPERATURE: 97 F | SYSTOLIC BLOOD PRESSURE: 142 MMHG | BODY MASS INDEX: 21.51 KG/M2 | HEART RATE: 97 BPM | DIASTOLIC BLOOD PRESSURE: 76 MMHG | WEIGHT: 121.38 LBS | HEIGHT: 63 IN

## 2025-01-02 DIAGNOSIS — R07.89 CHEST WALL PAIN: ICD-10-CM

## 2025-01-02 DIAGNOSIS — M54.16 LUMBAR RADICULOPATHY, CHRONIC: ICD-10-CM

## 2025-01-02 DIAGNOSIS — G89.29 CHRONIC BILATERAL LOW BACK PAIN WITH LEFT-SIDED SCIATICA: Primary | ICD-10-CM

## 2025-01-02 DIAGNOSIS — M54.42 CHRONIC BILATERAL LOW BACK PAIN WITH LEFT-SIDED SCIATICA: Primary | ICD-10-CM

## 2025-01-02 RX ORDER — TRAMADOL HYDROCHLORIDE 50 MG/1
50 TABLET ORAL EVERY 6 HOURS PRN
Qty: 28 TABLET | Refills: 0 | Status: SHIPPED | OUTPATIENT
Start: 2025-01-02 | End: 2025-01-09

## 2025-01-02 RX ORDER — CYCLOBENZAPRINE HCL 5 MG
5 TABLET ORAL 3 TIMES DAILY PRN
Qty: 15 TABLET | Refills: 0 | Status: SHIPPED | OUTPATIENT
Start: 2025-01-02 | End: 2025-01-12

## 2025-01-02 NOTE — PROGRESS NOTES
"   Patient ID: [id number]     Chief Complaint: Follow-up (Went to ED 12/30/24 for back pain. Stated "PT is making it worse and I in a lot of pain" )    HPI:     Antonina Jaimes is a 64 y.o. female here today for Follow-up (Went to ED 12/30/24 for back pain. Stated "PT is making it worse and I in a lot of pain" ). Discharged by physical therapy due to symptoms worsening with continued physical therapy.         -------------------------------------    CHF (congestive heart failure)    COPD (chronic obstructive pulmonary disease)    GERD (gastroesophageal reflux disease)    Hyperlipidemia    Hypertension    Hypokalemia    Insomnia    Nicotine dependence    Osteoporosis        Past Surgical History:   Procedure Laterality Date    ADENOIDECTOMY      COLON SURGERY  10/23    COLONOSCOPY N/A 08/08/2023    Procedure: COLONOSCOPY;  Surgeon: Mikhail Marcano MD;  Location: [facility];  Service: Endoscopy;  Laterality: N/A;  Scant ascending colon diverticular disease   sigmoid diverticular     COLONOSCOPY, WITH POLYPECTOMY USING HOT BIOPSY FORCEPS N/A 08/08/2023    Procedure: COLONOSCOPY, WITH POLYPECTOMY USING HOT BIOPSY FORCEPS;  Surgeon: Mikhail Marcano MD;  Location: [facility];  Service: Endoscopy;  Laterality: N/A;  B. Polyp at 10-15cm; no heat     COLONOSCOPY, WITH POLYPECTOMY USING SNARE N/A 08/08/2023    Procedure: COLONOSCOPY, WITH POLYPECTOMY USING SNARE;  Surgeon: Mikhail Marcano MD;  Location: [facility];  Service: Endoscopy;  Laterality: N/A;  A. appendicle polyp       EGD, WITH CLOSED BIOPSY  07/15/2023    Procedure: EGD, WITH CLOSED BIOPSY;  Surgeon: Tyree Hodges MD;  Location: [facility] OR;  Service: Gastroenterology;;    EGD, WITH HEMORRHAGE CONTROL  07/15/2023    Procedure: EGD,WITH HEMORRHAGE CONTROL;  Surgeon: Tyree Hodges MD;  Location: [facility] OR;  Service: Gastroenterology;;  gold probe 7FR    ESOPHAGOGASTRODUODENOSCOPY N/A 07/15/2023    Procedure: EGD (ESOPHAGOGASTRODUODENOSCOPY);  Surgeon: " Tyree Hodges MD;  Location: [facility] OR;  Service: Gastroenterology;  Laterality: N/A;    FRACTURE SURGERY  01/22/2024    HEMORRHOID SURGERY      HIP REPLACEMENT ARTHROPLASTY Left 01/22/2024    Procedure: ARTHROPLASTY, HIP REPLACEMENT;  Surgeon: Oleg Diaz MD;  Location: [facility] OR;  Service: Orthopedics;  Laterality: Left;    HYSTERECTOMY      JOINT REPLACEMENT  1/22/2024    hip replaxement    SMALL INTESTINE SURGERY  10/2023    TONSILLECTOMY         Review of patient's allergies indicates:  No Known Allergies    Outpatient Medications Marked as Taking for the 1/2/25 encounter (Office Visit) with Zachery Ling,    Medication Sig Dispense Refill    albuterol sulfate 2.5 mg/0.5 mL Nebu Take 2.5 mg by nebulization every 4 (four) hours as needed (Cough or wheezing). Rescue 30 each 1    albuterol-ipratropium (DUO-NEB) 2.5 mg-0.5 mg/3 mL nebulizer solution Take 3 mLs by nebulization every 6 (six) hours as needed for Shortness of Breath. 75 mL 1    aspirin 81 MG Chew Take 4 tablets (324 mg total) by mouth 2 (two) times daily. 240 tablet 0    digoxin (LANOXIN) 125 mcg tablet Take 1 tablet (0.125 mg total) by mouth once daily. 30 tablet 3    enalapril (VASOTEC) 2.5 MG tablet Take 1 tablet (2.5 mg total) by mouth 2 (two) times daily. 60 tablet 3    simvastatin (ZOCOR) 20 MG tablet TAKE 1 TABLET BY MOUTH EVERYDAY AT BEDTIME 30 tablet 11    traZODone (DESYREL) 50 MG tablet TAKE 1 TABLET BY MOUTH EVERY DAY IN THE EVENING 30 tablet 3    [DISCONTINUED] cyclobenzaprine (FLEXERIL) 5 MG tablet Take 1 tablet (5 mg total) by mouth 3 (three) times daily as needed for Muscle spasms. 15 tablet 0    [DISCONTINUED] traMADoL (ULTRAM) 50 mg tablet Take 1 tablet (50 mg total) by mouth every 6 (six) hours as needed for Pain. 12 tablet 0       Social History     Socioeconomic History    Marital status: Single   Tobacco Use    Smoking status: Every Day     Current packs/day: 0.50     Average packs/day: 1 pack/day for 40.2 years (40.0  ttl pk-yrs)     Types: Cigarettes     Start date: 10/7/1984    Smokeless tobacco: Never   Substance and Sexual Activity    Alcohol use: Yes     Alcohol/week: 2.0 standard drinks of alcohol     Types: 2 Cans of beer per week    Drug use: Never    Sexual activity: Not Currently     Birth control/protection: Abstinence     Social Drivers of Health     Financial Resource Strain: Low Risk  (2/1/2024)    Overall Financial Resource Strain (CARDIA)     Difficulty of Paying Living Expenses: Not very hard   Food Insecurity: No Food Insecurity (2/1/2024)    Hunger Vital Sign     Worried About Running Out of Food in the Last Year: Never true     Ran Out of Food in the Last Year: Never true   Transportation Needs: No Transportation Needs (2/1/2024)    PRAPARE - Transportation     Lack of Transportation (Medical): No     Lack of Transportation (Non-Medical): No   Physical Activity: Sufficiently Active (2/1/2024)    Exercise Vital Sign     Days of Exercise per Week: 7 days     Minutes of Exercise per Session: 90 min   Stress: No Stress Concern Present (2/1/2024)    [employer] - Occupational Stress Questionnaire     Feeling of Stress : Only a little   Housing Stability: Low Risk  (2/1/2024)    Housing Stability Vital Sign     Unable to Pay for Housing in the Last Year: No     Number of Places Lived in the Last Year: 1     Unstable Housing in the Last Year: No        Family History   Problem Relation Name Age of Onset    Hypertension Mother tootie lyles     Diabetes Mother tootie lyles     Cancer Mother tootie lyles     Hypertension Father malik lyles     Alcohol abuse Father malik lyles         Patient Care Team:  Zachery Ling DO as PCP - General (Family Medicine)     Subjective:     Review of Systems   Constitutional:  Negative for chills and fever.   Respiratory:  Negative for shortness of breath.    Cardiovascular:  Negative for chest pain.   Gastrointestinal:  Negative for constipation and  "diarrhea.   Musculoskeletal:  Positive for back pain.   Neurological:  Negative for headaches.   Psychiatric/Behavioral:  The patient does not have insomnia.        See HPI for details  All Other ROS: Negative except as stated in HPI.       Objective:     BP (!) 142/76 (BP Location: Right arm, Patient Position: Sitting)   Pulse 97   Temp 97 °F (36.1 °C)   Ht 5' 3" (1.6 m)   Wt 55.1 kg (121 lb 6.4 oz)   SpO2 98%   BMI 21.51 kg/m²     Physical Exam  Vitals reviewed.   Constitutional:       General: She is not in acute distress.     Appearance: Normal appearance.   Cardiovascular:      Rate and Rhythm: Normal rate and regular rhythm.      Heart sounds: No murmur heard.     No friction rub. No gallop.   Pulmonary:      Effort: No respiratory distress.      Breath sounds: No wheezing, rhonchi or rales.   Musculoskeletal:         General: Tenderness present. No swelling or deformity.      Right lower leg: No edema.      Left lower leg: No edema.   Skin:     General: Skin is warm and dry.      Findings: No lesion or rash.   Neurological:      General: No focal deficit present.      Mental Status: She is alert.   Psychiatric:         Mood and Affect: Mood normal.         Assessment/Plan:     1. Chronic bilateral low back pain with left-sided sciatica  -     MRI Lumbar Spine Without Contrast; Future; Expected date: 01/02/2025  -     cyclobenzaprine (FLEXERIL) 5 MG tablet; Take 1 tablet (5 mg total) by mouth 3 (three) times daily as needed for Muscle spasms.  Dispense: 15 tablet; Refill: 0    2. Lumbar radiculopathy, chronic  -     MRI Lumbar Spine Without Contrast; Future; Expected date: 01/02/2025    3. Chest wall pain  -     traMADoL (ULTRAM) 50 mg tablet; Take 1 tablet (50 mg total) by mouth every 6 (six) hours as needed for Pain.  Dispense: 28 tablet; Refill: 0        Follow up:     Follow up if symptoms worsen or fail to improve. In addition to their scheduled follow up, the patient has also been instructed to " follow up on as needed basis.

## 2025-01-08 ENCOUNTER — HOSPITAL ENCOUNTER (OUTPATIENT)
Dept: RADIOLOGY | Facility: HOSPITAL | Age: 65
Discharge: HOME | End: 2025-01-08
Attending: FAMILY MEDICINE
Payer: MEDICARE

## 2025-01-08 DIAGNOSIS — G89.29 CHRONIC BILATERAL LOW BACK PAIN WITH LEFT-SIDED SCIATICA: ICD-10-CM

## 2025-01-08 DIAGNOSIS — M54.42 CHRONIC BILATERAL LOW BACK PAIN WITH LEFT-SIDED SCIATICA: ICD-10-CM

## 2025-01-08 DIAGNOSIS — M54.16 LUMBAR RADICULOPATHY, CHRONIC: ICD-10-CM

## 2025-01-08 PROCEDURE — 72148 MRI LUMBAR SPINE W/O DYE: CPT | Mod: TC

## 2025-01-13 ENCOUNTER — TELEPHONE (OUTPATIENT)
Dept: FAMILY MEDICINE | Facility: CLINIC | Age: 65
End: 2025-01-13
Payer: MEDICARE

## 2025-01-13 DIAGNOSIS — M54.42 CHRONIC BILATERAL LOW BACK PAIN WITH LEFT-SIDED SCIATICA: Primary | ICD-10-CM

## 2025-01-13 DIAGNOSIS — G89.29 CHRONIC BILATERAL LOW BACK PAIN WITH LEFT-SIDED SCIATICA: Primary | ICD-10-CM

## 2025-01-13 NOTE — TELEPHONE ENCOUNTER
----- Message from Zachery Ling DO sent at 1/13/2025  1:04 PM CST -----  I have reviewed the imaging results.    1. Chronic compression deformities throughout the lumbar spine with superimposed acute component at T11.  2. Moderate degenerate narrowing spinal canal at L2-L3, mild at L1-L2 and L3-L5.  3. Mild multilevel neural foraminal stenoses.    Will refer to neurosurgery.

## 2025-01-15 ENCOUNTER — TELEPHONE (OUTPATIENT)
Dept: FAMILY MEDICINE | Facility: CLINIC | Age: 65
End: 2025-01-15
Payer: MEDICARE

## 2025-01-15 DIAGNOSIS — M54.42 CHRONIC BILATERAL LOW BACK PAIN WITH LEFT-SIDED SCIATICA: ICD-10-CM

## 2025-01-15 DIAGNOSIS — G89.29 CHRONIC BILATERAL LOW BACK PAIN WITH LEFT-SIDED SCIATICA: ICD-10-CM

## 2025-01-16 RX ORDER — CYCLOBENZAPRINE HCL 5 MG
5 TABLET ORAL 3 TIMES DAILY PRN
Qty: 15 TABLET | Refills: 0 | Status: SHIPPED | OUTPATIENT
Start: 2025-01-16 | End: 2025-01-26

## 2025-01-24 ENCOUNTER — TELEPHONE (OUTPATIENT)
Dept: NEUROSURGERY | Facility: CLINIC | Age: 65
End: 2025-01-24
Payer: MEDICARE

## 2025-01-24 NOTE — TELEPHONE ENCOUNTER
Patient referred to clinic Dr. Ling for Chronic bilateral low back pain with left-sided sciatica.     MRI Lumbar Spine 1/8/25. Impression:   Chronic compression deformities throughout the lumbar spine with superimposed acute component at T11.  Moderate degenerate narrowing spinal canal at L2-L3, mild at L1-L2 and L3-L5.  Mild multilevel neural foraminal stenoses.    Patient complains of constant pain with intermittent burning, stabbing, and spasms that radiates to left leg. Patient also complains of numbness/tingling in left foot and toes. Patient states symptoms have been present for years but have progressively worsened. Patient state pain wakes her up at night. Patient states pain is aggravated by prolonged sitting, walking, and standing and she has to use a cane to assist with ambulation. Patient denies any additional imaging or spine surgeries. Patient recently had to discontinue physical therapy at [facility] due to severe aggravation of pain. Patient states to manage the pain she is taking Tramadol 50mg and Flexeril 5mg recently prescribed by Dr. Ling; medications provide some relief.     Patient scheduled with Mary 2/6/25 and will proceed with Dr. Garber.

## 2025-01-28 ENCOUNTER — PATIENT MESSAGE (OUTPATIENT)
Dept: FAMILY MEDICINE | Facility: CLINIC | Age: 65
End: 2025-01-28
Payer: MEDICARE

## 2025-01-28 DIAGNOSIS — Z13.820 SCREENING FOR OSTEOPOROSIS: Primary | ICD-10-CM

## 2025-01-28 DIAGNOSIS — Z87.891 PERSONAL HISTORY OF NICOTINE DEPENDENCE: ICD-10-CM

## 2025-01-28 DIAGNOSIS — Z12.2 SCREENING FOR LUNG CANCER: ICD-10-CM

## 2025-01-28 DIAGNOSIS — Z78.0 ENCOUNTER FOR OSTEOPOROSIS SCREENING IN ASYMPTOMATIC POSTMENOPAUSAL PATIENT: ICD-10-CM

## 2025-01-28 DIAGNOSIS — R92.8 ABNORMAL MAMMOGRAM OF RIGHT BREAST: ICD-10-CM

## 2025-01-28 DIAGNOSIS — Z12.31 BREAST CANCER SCREENING BY MAMMOGRAM: Primary | ICD-10-CM

## 2025-01-28 DIAGNOSIS — Z12.2 ENCOUNTER FOR SCREENING FOR LUNG CANCER: ICD-10-CM

## 2025-01-28 DIAGNOSIS — Z13.820 ENCOUNTER FOR OSTEOPOROSIS SCREENING IN ASYMPTOMATIC POSTMENOPAUSAL PATIENT: ICD-10-CM

## 2025-01-29 NOTE — PROGRESS NOTES
[facility]  History & Physical  Neurosurgery      Antonina Jaimes   [id number]   1960       SUBJECTIVE:     CHIEF COMPLAINT:  Lower back pain, lower extremity pain    HPI:  Antonina Jaimes is a 64 y.o. female who presents for neurosurgical evaluation at the recommendation of Zachery Ling. The patient presents today describing describing chronic lower back pain with this pain intermittently shooting into the left posterior leg, heel and great toe.  The patient states these symptoms have been ongoing for 10-15 years without any injury or trauma to account for it.  The patient states she fractured her hip last January which required surgical intervention which did exacerbate her symptoms.  In September she began outpatient physical therapy for her hip and noticed severe worsening of mid back pain at that time causing her to have to discontinue physical therapy for her hip.  The patient states she is constantly in pain at this time.  She continues to have burning aching tightness into the lower back with intermittent shooting symptoms into the left-greater-than-right lower extremities.  She is ambulating with a single-point cane today secondary to pain.  She states with any type of movement such as housework, bending, sitting and lying down she will have intense grabbing and stabbing pain to the lower mid back with the occasional radiation into the right-greater-than-left ribs.  She is denying any changes in bowel or bladder function.  She is rating her pain a 10/10 at this time.  She was provided some Flexeril and tramadol by her primary care provider which she states did provide a slight relief of symptoms.  The patient reports she has a history of hysterectomy in her 30s and was diagnosed with osteoporosis not long after.  She denies any type of treatment for osteoporosis over the years.      Past Medical History:   Diagnosis Date    CHF (congestive heart failure)     COPD (chronic  obstructive pulmonary disease)     GERD (gastroesophageal reflux disease)     Hyperlipidemia     Hypertension     Hypokalemia     Insomnia     Nicotine dependence     Osteoporosis        Past Surgical History:   Procedure Laterality Date    ADENOIDECTOMY      COLON SURGERY  10/23    COLONOSCOPY N/A 08/08/2023    Procedure: COLONOSCOPY;  Surgeon: Mikhail Marcano MD;  Location: [facility];  Service: Endoscopy;  Laterality: N/A;  Scant ascending colon diverticular disease   sigmoid diverticular     COLONOSCOPY, WITH POLYPECTOMY USING HOT BIOPSY FORCEPS N/A 08/08/2023    Procedure: COLONOSCOPY, WITH POLYPECTOMY USING HOT BIOPSY FORCEPS;  Surgeon: Mikhail Marcano MD;  Location: [facility] ENDO;  Service: Endoscopy;  Laterality: N/A;  B. Polyp at 10-15cm; no heat     COLONOSCOPY, WITH POLYPECTOMY USING SNARE N/A 08/08/2023    Procedure: COLONOSCOPY, WITH POLYPECTOMY USING SNARE;  Surgeon: Mikhail Marcano MD;  Location: [facility];  Service: Endoscopy;  Laterality: N/A;  A. appendicle polyp       EGD, WITH CLOSED BIOPSY  07/15/2023    Procedure: EGD, WITH CLOSED BIOPSY;  Surgeon: Tyree Hodges MD;  Location: [facility];  Service: Gastroenterology;;    EGD, WITH HEMORRHAGE CONTROL  07/15/2023    Procedure: EGD,WITH HEMORRHAGE CONTROL;  Surgeon: Tyree Hodges MD;  Location: [facility];  Service: Gastroenterology;;  gold probe 7FR    ESOPHAGOGASTRODUODENOSCOPY N/A 07/15/2023    Procedure: EGD (ESOPHAGOGASTRODUODENOSCOPY);  Surgeon: Tyree Hodges MD;  Location: [facility];  Service: Gastroenterology;  Laterality: N/A;    FRACTURE SURGERY  01/22/2024    HEMORRHOID SURGERY      HIP REPLACEMENT ARTHROPLASTY Left 01/22/2024    Procedure: ARTHROPLASTY, HIP REPLACEMENT;  Surgeon: Oleg Diaz MD;  Location: [facility];  Service: Orthopedics;  Laterality: Left;    HYSTERECTOMY      JOINT REPLACEMENT  1/22/2024    hip replaxement    SMALL INTESTINE SURGERY  10/2023    TONSILLECTOMY         Family History   Problem Relation  Name Age of Onset    Hypertension Mother tootie lyles     Diabetes Mother tootie lyles     Cancer Mother tootie lyles     Hypertension Father malik lyles     Alcohol abuse Father malik lyles        Social History     Socioeconomic History    Marital status: Single   Tobacco Use    Smoking status: Every Day     Current packs/day: 0.50     Average packs/day: 1 pack/day for 40.3 years (40.0 ttl pk-yrs)     Types: Cigarettes     Start date: 10/7/1984    Smokeless tobacco: Never   Substance and Sexual Activity    Alcohol use: Yes     Alcohol/week: 2.0 standard drinks of alcohol     Types: 2 Cans of beer per week    Drug use: Never    Sexual activity: Not Currently     Birth control/protection: Abstinence     Social Drivers of Health     Financial Resource Strain: Low Risk  (2/1/2024)    Overall Financial Resource Strain (CARDIA)     Difficulty of Paying Living Expenses: Not very hard   Food Insecurity: No Food Insecurity (2/1/2024)    Hunger Vital Sign     Worried About Running Out of Food in the Last Year: Never true     Ran Out of Food in the Last Year: Never true   Transportation Needs: No Transportation Needs (2/1/2024)    PRAPARE - Transportation     Lack of Transportation (Medical): No     Lack of Transportation (Non-Medical): No   Physical Activity: Sufficiently Active (2/1/2024)    Exercise Vital Sign     Days of Exercise per Week: 7 days     Minutes of Exercise per Session: 90 min   Stress: No Stress Concern Present (2/1/2024)    [employer] - Occupational Stress Questionnaire     Feeling of Stress : Only a little   Housing Stability: Low Risk  (2/1/2024)    Housing Stability Vital Sign     Unable to Pay for Housing in the Last Year: No     Number of Places Lived in the Last Year: 1     Unstable Housing in the Last Year: No        Review of patient's allergies indicates:  No Known Allergies     Current Outpatient Medications   Medication Instructions    albuterol sulfate 2.5 mg,  "Nebulization, Every 4 hours PRN, Rescue    albuterol-ipratropium (DUO-NEB) 2.5 mg-0.5 mg/3 mL nebulizer solution 3 mLs, Nebulization, Every 6 hours PRN    aspirin 324 mg, Oral, 2 times daily    digoxin (LANOXIN) 0.125 mg, Oral, Daily    enalapril (VASOTEC) 2.5 mg, Oral, 2 times daily    HYDROcodone-acetaminophen (NORCO) 5-325 mg per tablet 1 tablet, Oral, Every 8 hours PRN    methocarbamoL (ROBAXIN) 750 mg, Oral, 4 times daily PRN    ondansetron (ZOFRAN-ODT) 4 mg, Oral, Once    simvastatin (ZOCOR) 20 MG tablet TAKE 1 TABLET BY MOUTH EVERYDAY AT BEDTIME    traZODone (DESYREL) 50 mg, Oral, Nightly          Review of Systems   Constitutional:  Negative for chills, fever and weight loss.   HENT:  Negative for congestion, hearing loss, nosebleeds and tinnitus.    Eyes:  Negative for blurred vision, double vision and photophobia.   Respiratory:  Negative for cough, shortness of breath and wheezing.    Cardiovascular:  Negative for chest pain, palpitations and leg swelling.   Gastrointestinal:  Negative for constipation, diarrhea, nausea and vomiting.   Genitourinary:  Negative for dysuria, frequency and urgency.   Musculoskeletal:  Positive for back pain. Negative for falls and neck pain.   Skin:  Negative for itching and rash.   Neurological:  Positive for tingling (Left-greater-than-right lower extremities). Negative for dizziness, tremors, sensory change, speech change, seizures, loss of consciousness and headaches.   Psychiatric/Behavioral:  Negative for depression, hallucinations and memory loss. The patient is not nervous/anxious.        OBJECTIVE:     Visit Vitals  /80 (BP Location: Left arm, Patient Position: Sitting)   Pulse 92   Resp 16   Ht 5' 3" (1.6 m)   Wt 53.1 kg (117 lb)   BMI 20.73 kg/m²        Physical Exam    General:  Pleasant, Well-nourished, Well-groomed.    Cardiovascular:  Neck is supple    Lungs:  Breathing is quiet, non-lablored    Abdomen:  Soft, non-tender, " non-distended.    Neurological:  Muscle strength against resistance:   Right Left   Deltoid (C5) 5/5 5/5   Biceps (C5/6) 5/5 5/5   Wrist Flexors (C5/6) 5/5 5/5   Triceps (C7) 5/5 5/5   Wrist extension (C7) 5/5 5/5   Finger abduction (C8) 5/5 5/5    5/5 5/5        Hip abduction 5/5 5/5   Hip adduction 5/5 5/5   Hip flexion (L2) 5/5 5/5   Knee extension (L3) 5/5 5/5   Knee flexion (L4) 5/5 5/5   Dorsiflexion (L5) 5/5 5/5   EHL (L5) 5/5 5/5   Plantar flexion (S1) 5/5 5/5   Sensation is intact to primary modalities in bilateral upper and lower extremities.  Strength exam is limited in the lower extremities secondary to pain    Reflexes:   Right Left   Triceps (C7) 2+ 2+   Biceps (C5) 2+ 2+   Brachioradialis (C6) 2+ 2+   Patellar (L4) 2+ 2+   Achilles (S1) 2+ 2+   Negative Tinel's, Clonus and Carcamo bilaterally.  Gait is  antalgic  Coordination is normal.  No tremor noted.    Imaging:  All pertinent neuroimaging independently reviewed. Discussed these findings in detail with the patient.    MRI of the lumbar spine dated 1/8/2025 reveals 5 non-rib-bearing vertebral bodies with chronic compression deformities throughout the lower thoracic and lumbar spine with subacute compression fracture with bone marrow edema noted at T11.  L1-2 with disc bulge and facet hypertrophy resulting in mild canal stenosis and mild bilateral foraminal stenosis.  L2-3 with disc bulging and facet hypertrophy with moderate canal stenosis and mild bilateral foraminal stenosis.  L3-4 and L4-5 with disc bulging, mild canal stenosis and mild bilateral foraminal stenosis.  L5-S1 with bilateral facet hypertrophy and no significant canal or foraminal stenosis.    ASSESSMENT:       ICD-10-CM ICD-9-CM   1. Closed wedge compression fracture of T11 vertebra, initial encounter  S22.080A 805.2   2. Chronic bilateral low back pain with left-sided sciatica  M54.42 724.2    G89.29 724.3     338.29   3. Osteoporosis with current pathological fracture,  unspecified osteoporosis type, initial encounter  M80.00XA 733.00     733.10     PLAN:     1. Closed wedge compression fracture of T11 vertebra, initial encounter (Primary)  - MRI Thoracic Spine Without Contrast; Future  - X-Ray Thoracolumbar Spine AP Lateral; Future  - Back/Cervical Brace For Home Use  - methocarbamoL (ROBAXIN) 750 MG Tab; Take 1 tablet (750 mg total) by mouth 4 (four) times daily as needed (MUSCLE SPASMS).  Dispense: 40 tablet; Refill: 1  - HYDROcodone-acetaminophen (NORCO) 5-325 mg per tablet; Take 1 tablet by mouth every 8 (eight) hours as needed for Pain (1-2 tablets as needed for severe pain).  Dispense: 30 tablet; Refill: 0  - ondansetron (ZOFRAN-ODT) 4 MG TbDL; Take 1 tablet (4 mg total) by mouth once. for 1 dose  Dispense: 1 tablet; Refill: 0    2. Chronic bilateral low back pain with left-sided sciatica  - Ambulatory referral/consult to Neurosurgery    3. Osteoporosis with current pathological fracture, unspecified osteoporosis type, initial encounter  - Ambulatory referral/consult to Endocrinology; Future    Antonina Jaimes presents today chronic lower back pain with radiation to lower extremities with severe lower mid back pain that began in September.  I did take the time to review the patient's recent lumbar MRI with she and her daughter in clinic today which reveals a subacute T11 compression fracture.  We will begin bracing in a TLSO brace today.  Activity restrictions were discussed including no heavy lifting, bending twisting or prolonged sitting activities.  She was advised to wear her brace anytime the head of bed is greater than 30°.  We will move forward with an updated MRI of the thoracic spine as well as x-rays to monitor her T11 fracture and evaluate for further progression as the patient has not been braced since the onset of symptoms.  I will notify them once these images are available for review regarding any changes to our plan.  We will tentatively schedule the  patient for a follow up visit in approximately 4 weeks with updated x-rays at that time as well.  The patient has a bone density scan scheduled for 3/3/2025 which we will attempt to move up to a sooner date and set her up with a referral to endocrinology given her various chronic compression fractures and history of osteoporosis.  We did also discuss the importance of smoking cessation and the importance of avoiding any type of NSAIDs as this can delay bony healing.  The patient was provided a prescription of muscle relaxers and a 1 time prescription of Norco.  She was advised how to utilize these medications safely him concomitantly.  She was also provided some Zofran as she states these medications have caused GI upset in the past.    Follow up in about 4 weeks (around 3/6/2025) for With Imaging Prior to Appt.      E/M Level Based On Time:   15 minutes spent on reviewing chart, which includes interpreting lab results and diagnostic tests.   30 minutes spent in the room with the patient performing a history and physical exam, counseling or educating the patient/caregiver, prescribing medications, ordering labwork/diagnostic tests, or placing referrals.   0 minutes spent collaborating plan of care with physician.   5 minutes spent documenting all relevant clinical informationin the electronic health record.     Total Time Spent: 50 minutes       CHET Srivastava    Disclaimer:  This note is prepared using voice recognition software and as such is likely to have errors despite attempts at proofreading. Please contact me for questions.

## 2025-01-29 NOTE — H&P (VIEW-ONLY)
[facility]  History & Physical  Neurosurgery      Antonina Jaimes   [id number]   1960       SUBJECTIVE:     CHIEF COMPLAINT:  Lower back pain, lower extremity pain    HPI:  Antonina Jaimes is a 64 y.o. female who presents for neurosurgical evaluation at the recommendation of Zachery Ling. The patient presents today describing describing chronic lower back pain with this pain intermittently shooting into the left posterior leg, heel and great toe.  The patient states these symptoms have been ongoing for 10-15 years without any injury or trauma to account for it.  The patient states she fractured her hip last January which required surgical intervention which did exacerbate her symptoms.  In September she began outpatient physical therapy for her hip and noticed severe worsening of mid back pain at that time causing her to have to discontinue physical therapy for her hip.  The patient states she is constantly in pain at this time.  She continues to have burning aching tightness into the lower back with intermittent shooting symptoms into the left-greater-than-right lower extremities.  She is ambulating with a single-point cane today secondary to pain.  She states with any type of movement such as housework, bending, sitting and lying down she will have intense grabbing and stabbing pain to the lower mid back with the occasional radiation into the right-greater-than-left ribs.  She is denying any changes in bowel or bladder function.  She is rating her pain a 10/10 at this time.  She was provided some Flexeril and tramadol by her primary care provider which she states did provide a slight relief of symptoms.  The patient reports she has a history of hysterectomy in her 30s and was diagnosed with osteoporosis not long after.  She denies any type of treatment for osteoporosis over the years.      Past Medical History:   Diagnosis Date    CHF (congestive heart failure)     COPD (chronic  obstructive pulmonary disease)     GERD (gastroesophageal reflux disease)     Hyperlipidemia     Hypertension     Hypokalemia     Insomnia     Nicotine dependence     Osteoporosis        Past Surgical History:   Procedure Laterality Date    ADENOIDECTOMY      COLON SURGERY  10/23    COLONOSCOPY N/A 08/08/2023    Procedure: COLONOSCOPY;  Surgeon: Mikhail Marcano MD;  Location: [facility];  Service: Endoscopy;  Laterality: N/A;  Scant ascending colon diverticular disease   sigmoid diverticular     COLONOSCOPY, WITH POLYPECTOMY USING HOT BIOPSY FORCEPS N/A 08/08/2023    Procedure: COLONOSCOPY, WITH POLYPECTOMY USING HOT BIOPSY FORCEPS;  Surgeon: Mikhail Marcano MD;  Location: [facility] ENDO;  Service: Endoscopy;  Laterality: N/A;  B. Polyp at 10-15cm; no heat     COLONOSCOPY, WITH POLYPECTOMY USING SNARE N/A 08/08/2023    Procedure: COLONOSCOPY, WITH POLYPECTOMY USING SNARE;  Surgeon: Mihkail Marcano MD;  Location: [facility];  Service: Endoscopy;  Laterality: N/A;  A. appendicle polyp       EGD, WITH CLOSED BIOPSY  07/15/2023    Procedure: EGD, WITH CLOSED BIOPSY;  Surgeon: Tyree Hodges MD;  Location: [facility];  Service: Gastroenterology;;    EGD, WITH HEMORRHAGE CONTROL  07/15/2023    Procedure: EGD,WITH HEMORRHAGE CONTROL;  Surgeon: Tyree Hodges MD;  Location: [facility];  Service: Gastroenterology;;  gold probe 7FR    ESOPHAGOGASTRODUODENOSCOPY N/A 07/15/2023    Procedure: EGD (ESOPHAGOGASTRODUODENOSCOPY);  Surgeon: Tyree Hodges MD;  Location: [facility];  Service: Gastroenterology;  Laterality: N/A;    FRACTURE SURGERY  01/22/2024    HEMORRHOID SURGERY      HIP REPLACEMENT ARTHROPLASTY Left 01/22/2024    Procedure: ARTHROPLASTY, HIP REPLACEMENT;  Surgeon: Oleg Diaz MD;  Location: [facility];  Service: Orthopedics;  Laterality: Left;    HYSTERECTOMY      JOINT REPLACEMENT  1/22/2024    hip replaxement    SMALL INTESTINE SURGERY  10/2023    TONSILLECTOMY         Family History   Problem Relation  Name Age of Onset    Hypertension Mother tootie lyles     Diabetes Mother tootie lyles     Cancer Mother tootie lyles     Hypertension Father malik lyles     Alcohol abuse Father malik lyles        Social History     Socioeconomic History    Marital status: Single   Tobacco Use    Smoking status: Every Day     Current packs/day: 0.50     Average packs/day: 1 pack/day for 40.3 years (40.0 ttl pk-yrs)     Types: Cigarettes     Start date: 10/7/1984    Smokeless tobacco: Never   Substance and Sexual Activity    Alcohol use: Yes     Alcohol/week: 2.0 standard drinks of alcohol     Types: 2 Cans of beer per week    Drug use: Never    Sexual activity: Not Currently     Birth control/protection: Abstinence     Social Drivers of Health     Financial Resource Strain: Low Risk  (2/1/2024)    Overall Financial Resource Strain (CARDIA)     Difficulty of Paying Living Expenses: Not very hard   Food Insecurity: No Food Insecurity (2/1/2024)    Hunger Vital Sign     Worried About Running Out of Food in the Last Year: Never true     Ran Out of Food in the Last Year: Never true   Transportation Needs: No Transportation Needs (2/1/2024)    PRAPARE - Transportation     Lack of Transportation (Medical): No     Lack of Transportation (Non-Medical): No   Physical Activity: Sufficiently Active (2/1/2024)    Exercise Vital Sign     Days of Exercise per Week: 7 days     Minutes of Exercise per Session: 90 min   Stress: No Stress Concern Present (2/1/2024)    [employer] [place] [employer] - Occupational Stress Questionnaire     Feeling of Stress : Only a little   Housing Stability: Low Risk  (2/1/2024)    Housing Stability Vital Sign     Unable to Pay for Housing in the Last Year: No     Number of Places Lived in the Last Year: 1     Unstable Housing in the Last Year: No        Review of patient's allergies indicates:  No Known Allergies     Current Outpatient Medications   Medication Instructions    albuterol sulfate 2.5 mg,  "Nebulization, Every 4 hours PRN, Rescue    albuterol-ipratropium (DUO-NEB) 2.5 mg-0.5 mg/3 mL nebulizer solution 3 mLs, Nebulization, Every 6 hours PRN    aspirin 324 mg, Oral, 2 times daily    digoxin (LANOXIN) 0.125 mg, Oral, Daily    enalapril (VASOTEC) 2.5 mg, Oral, 2 times daily    HYDROcodone-acetaminophen (NORCO) 5-325 mg per tablet 1 tablet, Oral, Every 8 hours PRN    methocarbamoL (ROBAXIN) 750 mg, Oral, 4 times daily PRN    ondansetron (ZOFRAN-ODT) 4 mg, Oral, Once    simvastatin (ZOCOR) 20 MG tablet TAKE 1 TABLET BY MOUTH EVERYDAY AT BEDTIME    traZODone (DESYREL) 50 mg, Oral, Nightly          Review of Systems   Constitutional:  Negative for chills, fever and weight loss.   HENT:  Negative for congestion, hearing loss, nosebleeds and tinnitus.    Eyes:  Negative for blurred vision, double vision and photophobia.   Respiratory:  Negative for cough, shortness of breath and wheezing.    Cardiovascular:  Negative for chest pain, palpitations and leg swelling.   Gastrointestinal:  Negative for constipation, diarrhea, nausea and vomiting.   Genitourinary:  Negative for dysuria, frequency and urgency.   Musculoskeletal:  Positive for back pain. Negative for falls and neck pain.   Skin:  Negative for itching and rash.   Neurological:  Positive for tingling (Left-greater-than-right lower extremities). Negative for dizziness, tremors, sensory change, speech change, seizures, loss of consciousness and headaches.   Psychiatric/Behavioral:  Negative for depression, hallucinations and memory loss. The patient is not nervous/anxious.        OBJECTIVE:     Visit Vitals  /80 (BP Location: Left arm, Patient Position: Sitting)   Pulse 92   Resp 16   Ht 5' 3" (1.6 m)   Wt 53.1 kg (117 lb)   BMI 20.73 kg/m²        Physical Exam    General:  Pleasant, Well-nourished, Well-groomed.    Cardiovascular:  Neck is supple    Lungs:  Breathing is quiet, non-lablored    Abdomen:  Soft, non-tender, " non-distended.    Neurological:  Muscle strength against resistance:   Right Left   Deltoid (C5) 5/5 5/5   Biceps (C5/6) 5/5 5/5   Wrist Flexors (C5/6) 5/5 5/5   Triceps (C7) 5/5 5/5   Wrist extension (C7) 5/5 5/5   Finger abduction (C8) 5/5 5/5    5/5 5/5        Hip abduction 5/5 5/5   Hip adduction 5/5 5/5   Hip flexion (L2) 5/5 5/5   Knee extension (L3) 5/5 5/5   Knee flexion (L4) 5/5 5/5   Dorsiflexion (L5) 5/5 5/5   EHL (L5) 5/5 5/5   Plantar flexion (S1) 5/5 5/5   Sensation is intact to primary modalities in bilateral upper and lower extremities.  Strength exam is limited in the lower extremities secondary to pain    Reflexes:   Right Left   Triceps (C7) 2+ 2+   Biceps (C5) 2+ 2+   Brachioradialis (C6) 2+ 2+   Patellar (L4) 2+ 2+   Achilles (S1) 2+ 2+   Negative Tinel's, Clonus and Carcamo bilaterally.  Gait is  antalgic  Coordination is normal.  No tremor noted.    Imaging:  All pertinent neuroimaging independently reviewed. Discussed these findings in detail with the patient.    MRI of the lumbar spine dated 1/8/2025 reveals 5 non-rib-bearing vertebral bodies with chronic compression deformities throughout the lower thoracic and lumbar spine with subacute compression fracture with bone marrow edema noted at T11.  L1-2 with disc bulge and facet hypertrophy resulting in mild canal stenosis and mild bilateral foraminal stenosis.  L2-3 with disc bulging and facet hypertrophy with moderate canal stenosis and mild bilateral foraminal stenosis.  L3-4 and L4-5 with disc bulging, mild canal stenosis and mild bilateral foraminal stenosis.  L5-S1 with bilateral facet hypertrophy and no significant canal or foraminal stenosis.    ASSESSMENT:       ICD-10-CM ICD-9-CM   1. Closed wedge compression fracture of T11 vertebra, initial encounter  S22.080A 805.2   2. Chronic bilateral low back pain with left-sided sciatica  M54.42 724.2    G89.29 724.3     338.29   3. Osteoporosis with current pathological fracture,  unspecified osteoporosis type, initial encounter  M80.00XA 733.00     733.10     PLAN:     1. Closed wedge compression fracture of T11 vertebra, initial encounter (Primary)  - MRI Thoracic Spine Without Contrast; Future  - X-Ray Thoracolumbar Spine AP Lateral; Future  - Back/Cervical Brace For Home Use  - methocarbamoL (ROBAXIN) 750 MG Tab; Take 1 tablet (750 mg total) by mouth 4 (four) times daily as needed (MUSCLE SPASMS).  Dispense: 40 tablet; Refill: 1  - HYDROcodone-acetaminophen (NORCO) 5-325 mg per tablet; Take 1 tablet by mouth every 8 (eight) hours as needed for Pain (1-2 tablets as needed for severe pain).  Dispense: 30 tablet; Refill: 0  - ondansetron (ZOFRAN-ODT) 4 MG TbDL; Take 1 tablet (4 mg total) by mouth once. for 1 dose  Dispense: 1 tablet; Refill: 0    2. Chronic bilateral low back pain with left-sided sciatica  - Ambulatory referral/consult to Neurosurgery    3. Osteoporosis with current pathological fracture, unspecified osteoporosis type, initial encounter  - Ambulatory referral/consult to Endocrinology; Future    Antonina Jaimes presents today chronic lower back pain with radiation to lower extremities with severe lower mid back pain that began in September.  I did take the time to review the patient's recent lumbar MRI with she and her daughter in clinic today which reveals a subacute T11 compression fracture.  We will begin bracing in a TLSO brace today.  Activity restrictions were discussed including no heavy lifting, bending twisting or prolonged sitting activities.  She was advised to wear her brace anytime the head of bed is greater than 30°.  We will move forward with an updated MRI of the thoracic spine as well as x-rays to monitor her T11 fracture and evaluate for further progression as the patient has not been braced since the onset of symptoms.  I will notify them once these images are available for review regarding any changes to our plan.  We will tentatively schedule the  patient for a follow up visit in approximately 4 weeks with updated x-rays at that time as well.  The patient has a bone density scan scheduled for 3/3/2025 which we will attempt to move up to a sooner date and set her up with a referral to endocrinology given her various chronic compression fractures and history of osteoporosis.  We did also discuss the importance of smoking cessation and the importance of avoiding any type of NSAIDs as this can delay bony healing.  The patient was provided a prescription of muscle relaxers and a 1 time prescription of Norco.  She was advised how to utilize these medications safely him concomitantly.  She was also provided some Zofran as she states these medications have caused GI upset in the past.    Follow up in about 4 weeks (around 3/6/2025) for With Imaging Prior to Appt.      E/M Level Based On Time:   15 minutes spent on reviewing chart, which includes interpreting lab results and diagnostic tests.   30 minutes spent in the room with the patient performing a history and physical exam, counseling or educating the patient/caregiver, prescribing medications, ordering labwork/diagnostic tests, or placing referrals.   0 minutes spent collaborating plan of care with physician.   5 minutes spent documenting all relevant clinical informationin the electronic health record.     Total Time Spent: 50 minutes       CHET Srivastava    Disclaimer:  This note is prepared using voice recognition software and as such is likely to have errors despite attempts at proofreading. Please contact me for questions.

## 2025-02-03 DIAGNOSIS — J44.9 CHRONIC OBSTRUCTIVE PULMONARY DISEASE, UNSPECIFIED COPD TYPE: ICD-10-CM

## 2025-02-03 RX ORDER — ALBUTEROL SULFATE 2.5 MG/.5ML
2.5 SOLUTION RESPIRATORY (INHALATION) EVERY 4 HOURS PRN
Qty: 30 EACH | Refills: 1 | Status: SHIPPED | OUTPATIENT
Start: 2025-02-03 | End: 2026-02-03

## 2025-02-05 ENCOUNTER — TELEPHONE (OUTPATIENT)
Dept: NEUROSURGERY | Facility: CLINIC | Age: 65
End: 2025-02-05
Payer: MEDICARE

## 2025-02-05 NOTE — TELEPHONE ENCOUNTER
Spoke with patient - she is unable to come in today but will come in tomorrow at 9:00 am to see ITZ

## 2025-02-05 NOTE — TELEPHONE ENCOUNTER
Called patient to inquire if she can come in today at 11:00 am to see JK instead of tomorrow. Patient is going to check and call me back within a half hour.

## 2025-02-06 ENCOUNTER — OFFICE VISIT (OUTPATIENT)
Dept: NEUROSURGERY | Facility: CLINIC | Age: 65
End: 2025-02-06
Payer: MEDICARE

## 2025-02-06 VITALS
WEIGHT: 117 LBS | DIASTOLIC BLOOD PRESSURE: 80 MMHG | RESPIRATION RATE: 16 BRPM | BODY MASS INDEX: 20.73 KG/M2 | SYSTOLIC BLOOD PRESSURE: 138 MMHG | HEART RATE: 92 BPM | HEIGHT: 63 IN

## 2025-02-06 DIAGNOSIS — M54.42 CHRONIC BILATERAL LOW BACK PAIN WITH LEFT-SIDED SCIATICA: ICD-10-CM

## 2025-02-06 DIAGNOSIS — G89.29 CHRONIC BILATERAL LOW BACK PAIN WITH LEFT-SIDED SCIATICA: ICD-10-CM

## 2025-02-06 DIAGNOSIS — M80.00XA OSTEOPOROSIS WITH CURRENT PATHOLOGICAL FRACTURE, UNSPECIFIED OSTEOPOROSIS TYPE, INITIAL ENCOUNTER: ICD-10-CM

## 2025-02-06 DIAGNOSIS — S22.080A CLOSED WEDGE COMPRESSION FRACTURE OF T11 VERTEBRA, INITIAL ENCOUNTER: Primary | ICD-10-CM

## 2025-02-06 PROCEDURE — 3075F SYST BP GE 130 - 139MM HG: CPT | Mod: CPTII,,, | Performed by: NURSE PRACTITIONER

## 2025-02-06 PROCEDURE — 1159F MED LIST DOCD IN RCRD: CPT | Mod: CPTII,,, | Performed by: NURSE PRACTITIONER

## 2025-02-06 PROCEDURE — 1100F PTFALLS ASSESS-DOCD GE2>/YR: CPT | Mod: CPTII,,, | Performed by: NURSE PRACTITIONER

## 2025-02-06 PROCEDURE — 1125F AMNT PAIN NOTED PAIN PRSNT: CPT | Mod: CPTII,,, | Performed by: NURSE PRACTITIONER

## 2025-02-06 PROCEDURE — 3079F DIAST BP 80-89 MM HG: CPT | Mod: CPTII,,, | Performed by: NURSE PRACTITIONER

## 2025-02-06 PROCEDURE — 3008F BODY MASS INDEX DOCD: CPT | Mod: CPTII,,, | Performed by: NURSE PRACTITIONER

## 2025-02-06 PROCEDURE — 1160F RVW MEDS BY RX/DR IN RCRD: CPT | Mod: CPTII,,, | Performed by: NURSE PRACTITIONER

## 2025-02-06 PROCEDURE — 3288F FALL RISK ASSESSMENT DOCD: CPT | Mod: CPTII,,, | Performed by: NURSE PRACTITIONER

## 2025-02-06 PROCEDURE — 99204 OFFICE O/P NEW MOD 45 MIN: CPT | Mod: ,,, | Performed by: NURSE PRACTITIONER

## 2025-02-06 RX ORDER — METHOCARBAMOL 750 MG/1
750 TABLET, FILM COATED ORAL 4 TIMES DAILY PRN
Qty: 40 TABLET | Refills: 1 | Status: SHIPPED | OUTPATIENT
Start: 2025-02-06 | End: 2025-02-16

## 2025-02-06 RX ORDER — HYDROCODONE BITARTRATE AND ACETAMINOPHEN 5; 325 MG/1; MG/1
1 TABLET ORAL EVERY 8 HOURS PRN
Qty: 30 TABLET | Refills: 0 | Status: SHIPPED | OUTPATIENT
Start: 2025-02-06

## 2025-02-06 RX ORDER — ONDANSETRON 4 MG/1
4 TABLET, ORALLY DISINTEGRATING ORAL ONCE
Qty: 1 TABLET | Refills: 0 | Status: SHIPPED | OUTPATIENT
Start: 2025-02-06 | End: 2025-02-06

## 2025-02-07 ENCOUNTER — HOSPITAL ENCOUNTER (OUTPATIENT)
Dept: RADIOLOGY | Facility: HOSPITAL | Age: 65
Discharge: HOME | End: 2025-02-07
Attending: FAMILY MEDICINE
Payer: MEDICARE

## 2025-02-07 DIAGNOSIS — Z87.891 PERSONAL HISTORY OF NICOTINE DEPENDENCE: ICD-10-CM

## 2025-02-07 PROCEDURE — 71271 CT THORAX LUNG CANCER SCR C-: CPT | Mod: TC

## 2025-02-10 ENCOUNTER — TELEPHONE (OUTPATIENT)
Dept: FAMILY MEDICINE | Facility: CLINIC | Age: 65
End: 2025-02-10
Payer: MEDICARE

## 2025-02-10 NOTE — TELEPHONE ENCOUNTER
----- Message from Zachery Ling DO sent at 2/10/2025  2:57 PM CST -----  I have reviewed the imaging results. There are no significant abnormalities noted. Continue annual lung screenings.

## 2025-02-12 ENCOUNTER — HOSPITAL ENCOUNTER (OUTPATIENT)
Dept: RADIOLOGY | Facility: HOSPITAL | Age: 65
Discharge: HOME | End: 2025-02-12
Attending: NURSE PRACTITIONER
Payer: MEDICARE

## 2025-02-12 ENCOUNTER — HOSPITAL ENCOUNTER (OUTPATIENT)
Dept: RADIOLOGY | Facility: HOSPITAL | Age: 65
Discharge: HOME | End: 2025-02-12
Attending: FAMILY MEDICINE
Payer: MEDICARE

## 2025-02-12 DIAGNOSIS — S22.080A CLOSED WEDGE COMPRESSION FRACTURE OF T11 VERTEBRA, INITIAL ENCOUNTER: ICD-10-CM

## 2025-02-12 DIAGNOSIS — Z13.820 ENCOUNTER FOR OSTEOPOROSIS SCREENING IN ASYMPTOMATIC POSTMENOPAUSAL PATIENT: ICD-10-CM

## 2025-02-12 DIAGNOSIS — Z78.0 ENCOUNTER FOR OSTEOPOROSIS SCREENING IN ASYMPTOMATIC POSTMENOPAUSAL PATIENT: ICD-10-CM

## 2025-02-12 PROCEDURE — 72080 X-RAY EXAM THORACOLMB 2/> VW: CPT | Mod: TC

## 2025-02-12 PROCEDURE — 72146 MRI CHEST SPINE W/O DYE: CPT | Mod: TC

## 2025-02-12 PROCEDURE — 77080 DXA BONE DENSITY AXIAL: CPT | Mod: TC

## 2025-02-13 ENCOUNTER — TELEPHONE (OUTPATIENT)
Dept: NEUROSURGERY | Facility: CLINIC | Age: 65
End: 2025-02-13
Payer: MEDICARE

## 2025-02-13 DIAGNOSIS — S22.080A CLOSED WEDGE COMPRESSION FRACTURE OF T11 VERTEBRA, INITIAL ENCOUNTER: ICD-10-CM

## 2025-02-13 DIAGNOSIS — M80.08XA AGE-RELATED OSTEOPOROSIS WITH CURRENT PATHOLOGICAL FRACTURE, VERTEBRA(E), INITIAL ENCOUNTER FOR FRACTURE: ICD-10-CM

## 2025-02-13 DIAGNOSIS — S22.060A CLOSED WEDGE COMPRESSION FRACTURE OF T7 VERTEBRA, INITIAL ENCOUNTER: Primary | ICD-10-CM

## 2025-02-13 DIAGNOSIS — S22.070A CLOSED WEDGE COMPRESSION FRACTURE OF T10 VERTEBRA, INITIAL ENCOUNTER: ICD-10-CM

## 2025-02-13 NOTE — TELEPHONE ENCOUNTER
Spoke with the patient regarding her recent MRI and x-rays which reveal multiple acute fractures in the thoracic spine.  We will refer her on for kyphoplasty at this time.  I have asked her to contact our office once this procedure scheduled so that we can schedule her to follow up in clinic 2 weeks following this procedure.  She verbalizes understanding at this time.

## 2025-02-18 ENCOUNTER — TELEPHONE (OUTPATIENT)
Dept: NEUROSURGERY | Facility: CLINIC | Age: 65
End: 2025-02-18
Payer: MEDICARE

## 2025-02-18 NOTE — TELEPHONE ENCOUNTER
The patient called the office this afternoon to let us know that she will be able to come for her scheduled appointment on 3/17/25 at 11:00.

## 2025-02-18 NOTE — TELEPHONE ENCOUNTER
The patient called to notify us at her kyphoplasty is scheduled for 3/5/2025.  Would you mind cancelling her follow up visit with me on 3/10/2025 and postponing this to the following week instead?  Thanks

## 2025-02-22 ENCOUNTER — RESULTS FOLLOW-UP (OUTPATIENT)
Dept: URGENT CARE | Facility: CLINIC | Age: 65
End: 2025-02-22
Payer: MEDICARE

## 2025-02-22 DIAGNOSIS — I50.20 SYSTOLIC CONGESTIVE HEART FAILURE, UNSPECIFIED HF CHRONICITY: Primary | ICD-10-CM

## 2025-02-24 NOTE — TELEPHONE ENCOUNTER
----- Message from Sherine Spann NP sent at 2/22/2025  2:10 PM CST -----  Please inform patient of lab results.     1.DXA scan shows shows osteoporosis of the right hip which increases the patient at fracture risk.     Would recommend Prolia 60 mg injection every 6 months.     Side effects of Prolia are low calcium levels in your blood (spasms, twitches, muscle cramps, numbness or tingling in fingers, toes, around mouth), osteonecrosis, unusual thigh fractures, dermatitis,   or infections.     If the patient agrees, please place the order.    Thanks for all you do,   Sherine   ----- Message -----  From: Fabiola, Rad Results In  Sent: 2/12/2025   2:50 PM CST  To: Zachery Ling DO

## 2025-02-26 NOTE — PROGRESS NOTES
Patient informed of risk of prolia with digoxin and instructed to test her digoxin level in one month per Sherine

## 2025-02-27 NOTE — PROGRESS NOTES
[facility]  History & Physical  Neurosurgery      Antonina Jaimes   [id number]   1960       SUBJECTIVE:     CHIEF COMPLAINT:  Lower back pain, lower extremity pain      HPI:  Ms. Sarkar returns to our clinic today once again accompanied by her daughter, Bea.  The patient states she has seen some improvement of her sharp stabbing mid back pain following her T7 and T10 kyphoplasties with Dr. Argueta on 3/10/2025.  She states she unfortunately does continue to have some pain radiating into the left ribcage under the breast as well as down the left leg and into the left foot and toes.  She also describes numbness and tingling into the left lower extremity.  She is rating her pain a 6/10 today.  She continues to find relief with utilization of Norco and Robaxin.  She continues to ambulate with a single-point cane secondary to pain.  She states she was contacted by her primary care provider regarding her recent DEXA scan and Prolia was recommended although she believes she was told she is unable to initiate Prolia secondary to her digoxin requirements.  She also has a scheduled consultation with endocrinology although is unable to be seen until January of 2026.  She is denying any changes in bowel or bladder function.  She does continue to occasionally have difficulty with ambulation secondary to her pain.  Unfortunately her symptoms are affecting her sleep at night.    Previous HPI:  Antonina Jaimes is a 64 y.o. female who presents for neurosurgical evaluation at the recommendation of Zachery Ling. The patient presents today describing chronic lower back pain with this pain intermittently shooting into the left posterior leg, heel and great toe.  The patient states these symptoms have been ongoing for 10-15 years without any injury or trauma to account for it.  The patient states she fractured her hip last January which required surgical intervention which did exacerbate her symptoms.  In  September she began outpatient physical therapy for her hip and noticed severe worsening of mid back pain at that time causing her to have to discontinue physical therapy for her hip.  The patient states she is constantly in pain at this time.  She continues to have burning aching tightness into the lower back with intermittent shooting symptoms into the left-greater-than-right lower extremities.  She is ambulating with a single-point cane today secondary to pain.  She states with any type of movement such as housework, bending, sitting and lying down she will have intense grabbing and stabbing pain to the lower mid back with the occasional radiation into the right-greater-than-left ribs.  She is denying any changes in bowel or bladder function.  She is rating her pain a 10/10 at this time.  She was provided some Flexeril and tramadol by her primary care provider which she states did provide a slight relief of symptoms.  The patient reports she has a history of hysterectomy in her 30s and was diagnosed with osteoporosis not long after.  She denies any type of treatment for osteoporosis over the years.      Past Medical History:   Diagnosis Date    CHF (congestive heart failure)     COPD (chronic obstructive pulmonary disease)     GERD (gastroesophageal reflux disease)     Hyperlipidemia     Hypertension     Hypokalemia     Insomnia     Nicotine dependence     Osteoporosis        Past Surgical History:   Procedure Laterality Date    ADENOIDECTOMY      COLON SURGERY  10/23    COLONOSCOPY N/A 08/08/2023    Procedure: COLONOSCOPY;  Surgeon: Mikhail Marcano MD;  Location: [facility];  Service: Endoscopy;  Laterality: N/A;  Scant ascending colon diverticular disease   sigmoid diverticular     COLONOSCOPY, WITH POLYPECTOMY USING HOT BIOPSY FORCEPS N/A 08/08/2023    Procedure: COLONOSCOPY, WITH POLYPECTOMY USING HOT BIOPSY FORCEPS;  Surgeon: Mikhail Marcano MD;  Location: [facility];  Service: Endoscopy;   Laterality: N/A;  B. Polyp at 10-15cm; no heat     COLONOSCOPY, WITH POLYPECTOMY USING SNARE N/A 08/08/2023    Procedure: COLONOSCOPY, WITH POLYPECTOMY USING SNARE;  Surgeon: Mikhail Marcano MD;  Location: [facility];  Service: Endoscopy;  Laterality: N/A;  A. appendicle polyp       EGD, WITH CLOSED BIOPSY  07/15/2023    Procedure: EGD, WITH CLOSED BIOPSY;  Surgeon: Tyree Hodges MD;  Location: [facility] OR;  Service: Gastroenterology;;    EGD, WITH HEMORRHAGE CONTROL  07/15/2023    Procedure: EGD,WITH HEMORRHAGE CONTROL;  Surgeon: Tyree Hodges MD;  Location: [facility] OR;  Service: Gastroenterology;;  gold probe 7FR    ESOPHAGOGASTRODUODENOSCOPY N/A 07/15/2023    Procedure: EGD (ESOPHAGOGASTRODUODENOSCOPY);  Surgeon: Tyree Hodges MD;  Location: [facility];  Service: Gastroenterology;  Laterality: N/A;    FRACTURE SURGERY  01/22/2024    HEMORRHOID SURGERY      HIP REPLACEMENT ARTHROPLASTY Left 01/22/2024    Procedure: ARTHROPLASTY, HIP REPLACEMENT;  Surgeon: Oleg Diaz MD;  Location: [facility];  Service: Orthopedics;  Laterality: Left;    HYSTERECTOMY      JOINT REPLACEMENT  1/22/2024    hip replaxement    SMALL INTESTINE SURGERY  10/2023    TONSILLECTOMY         Family History   Problem Relation Name Age of Onset    Hypertension Mother tootie lyles     Diabetes Mother tootie lyles     Cancer Mother tootie lyles     Hypertension Father malik rafal     Alcohol abuse Father malik rafal        Social History     Socioeconomic History    Marital status: Single   Tobacco Use    Smoking status: Every Day     Current packs/day: 0.50     Average packs/day: 1 pack/day for 40.4 years (40.1 ttl pk-yrs)     Types: Cigarettes     Start date: 10/7/1984    Smokeless tobacco: Never   Substance and Sexual Activity    Alcohol use: Yes     Alcohol/week: 2.0 standard drinks of alcohol     Types: 2 Cans of beer per week    Drug use: Never    Sexual activity: Not Currently     Birth control/protection: Abstinence     Social  Drivers of Health     Financial Resource Strain: Low Risk  (2/1/2024)    Overall Financial Resource Strain (CARDIA)     Difficulty of Paying Living Expenses: Not very hard   Food Insecurity: No Food Insecurity (2/1/2024)    Hunger Vital Sign     Worried About Running Out of Food in the Last Year: Never true     Ran Out of Food in the Last Year: Never true   Transportation Needs: No Transportation Needs (2/1/2024)    PRAPARE - Transportation     Lack of Transportation (Medical): No     Lack of Transportation (Non-Medical): No   Physical Activity: Sufficiently Active (2/1/2024)    Exercise Vital Sign     Days of Exercise per Week: 7 days     Minutes of Exercise per Session: 90 min   Stress: No Stress Concern Present (2/1/2024)    [employer] [place] [employer] - Occupational Stress Questionnaire     Feeling of Stress : Only a little   Housing Stability: Low Risk  (2/1/2024)    Housing Stability Vital Sign     Unable to Pay for Housing in the Last Year: No     Number of Places Lived in the Last Year: 1     Unstable Housing in the Last Year: No        Review of patient's allergies indicates:  No Known Allergies     Current Outpatient Medications   Medication Instructions    albuterol (PROVENTIL) 2.5 mg, Every 4 hours PRN    albuterol (VENTOLIN HFA) 90 mcg/actuation inhaler 2 puffs, Every 6 hours PRN    aspirin (ECOTRIN) 81 mg, Daily    digoxin (LANOXIN) 0.125 mg, Oral, Daily    enalapril (VASOTEC) 2.5 mg, Oral, 2 times daily    gabapentin (NEURONTIN) 300 mg, Oral, 3 times daily    HYDROcodone-acetaminophen (NORCO) 5-325 mg per tablet 1 tablet, Oral, Every 8 hours PRN    methocarbamoL (ROBAXIN) 750-1,500 mg, Oral, 4 times daily PRN    ondansetron (ZOFRAN-ODT) 4 mg, Every 8 hours PRN    simvastatin (ZOCOR) 20 MG tablet TAKE 1 TABLET BY MOUTH EVERYDAY AT BEDTIME          Review of Systems   Constitutional:  Negative for chills, fever and weight loss.   HENT:  Negative for congestion, hearing loss, nosebleeds and  "tinnitus.    Eyes:  Negative for blurred vision, double vision and photophobia.   Respiratory:  Negative for cough, shortness of breath and wheezing.    Cardiovascular:  Negative for chest pain, palpitations and leg swelling.   Gastrointestinal:  Negative for constipation, diarrhea, nausea and vomiting.   Genitourinary:  Negative for dysuria, frequency and urgency.   Musculoskeletal:  Positive for back pain. Negative for falls and neck pain.   Skin:  Negative for itching and rash.   Neurological:  Positive for tingling (Left-greater-than-right lower extremities). Negative for dizziness, tremors, sensory change, speech change, seizures, loss of consciousness and headaches.   Psychiatric/Behavioral:  Negative for depression, hallucinations and memory loss. The patient is not nervous/anxious.        OBJECTIVE:     Visit Vitals  BP (!) 151/85 (BP Location: Left arm, Patient Position: Sitting)   Pulse 92   Resp 16   Ht 5' 3" (1.6 m)   Wt 53.1 kg (117 lb)   BMI 20.73 kg/m²   Patient was advised to check her blood pressure once she returns home in his more comfortable.  Should her blood pressure remain elevated she was advised to contact her primary care provider.       Physical Exam    General:  Pleasant, Well-nourished, Well-groomed.    Cardiovascular:  Neck is supple    Lungs:  Breathing is quiet, non-lablored    Abdomen:  Soft, non-tender, non-distended.    Neurological:  Muscle strength against resistance:   Right Left   Deltoid (C5) 5/5 5/5   Biceps (C5/6) 5/5 5/5   Wrist Flexors (C5/6) 5/5 5/5   Triceps (C7) 5/5 5/5   Wrist extension (C7) 5/5 5/5   Finger abduction (C8) 5/5 5/5    5/5 5/5        Hip abduction 5/5 5/5   Hip adduction 5/5 5/5   Hip flexion (L2) 5/5 5/5   Knee extension (L3) 5/5 5/5   Knee flexion (L4) 5/5 5/5   Dorsiflexion (L5) 5/5 5/5   EHL (L5) 5/5 5/5   Plantar flexion (S1) 5/5 5/5   Sensation is intact to primary modalities in bilateral upper and lower extremities.      Reflexes:   Right Left "   Triceps (C7) 2+ 2+   Biceps (C5) 2+ 2+   Brachioradialis (C6) 2+ 2+   Patellar (L4) 2+ 2+   Achilles (S1) 2+ 2+   Negative Tinel's, Clonus and Carcamo bilaterally.  Gait is  antalgic  Coordination is normal.  No tremor noted.    Imaging:  All pertinent neuroimaging independently reviewed. Discussed these findings in detail with the patient.    MRI of the lumbar spine dated 1/8/2025 reveals 5 non-rib-bearing vertebral bodies with chronic compression deformities throughout the lower thoracic and lumbar spine with subacute compression fracture with bone marrow edema noted at T11.  L1-2 with disc bulge and facet hypertrophy resulting in mild canal stenosis and mild bilateral foraminal stenosis.  L2-3 with disc bulging and facet hypertrophy with moderate canal stenosis and mild bilateral foraminal stenosis.  L3-4 and L4-5 with disc bulging, mild canal stenosis and mild bilateral foraminal stenosis.  L5-S1 with bilateral facet hypertrophy and no significant canal or foraminal stenosis.    DEXA scan dated 2/12/2025 reveals osteoporosis of the right hip with normal bone density of the lumbar spine.    X-rays of the thoracolumbar spine dated 2/12/2025 reveal various compression deformities throughout the thoracic spine including T4 through T12.  Bones do appear osteopenic    MRI of the thoracic spine dated 2/12/2025 reveals compression deformities throughout the thoracic spine with less prominent anterior wedging noted at T1, T2 and T3 as well as T5.  Heterogeneous bone marrow signal intensity diffusely throughout with decreased T1 signal evident at T7 and T10.  Minimal retropulsion of superior endplates into the spinal canal from T7-L1.  Advanced thoracic spondylosis is noted as well.    ASSESSMENT:       ICD-10-CM ICD-9-CM   1. Lumbar radiculopathy  M54.16 724.4   2. Thoracic radiculitis  M54.14 724.4   3. Thoracic compression fracture, with routine healing, subsequent encounter  S22.000D V54.17   4. Cigarette nicotine  dependence with nicotine-induced disorder  F17.219 292.9     PLAN:     1. Lumbar radiculopathy (Primary)  - Ambulatory referral/consult to Pain Clinic; Future  - gabapentin (NEURONTIN) 300 MG capsule; Take 1 capsule (300 mg total) by mouth 3 (three) times daily.  Dispense: 90 capsule; Refill: 2    2. Thoracic compression fracture, with routine healing, subsequent encounter  - X-Ray Thoracic Spine AP Lateral; Future    3. Thoracic radiculitis  - gabapentin (NEURONTIN) 300 MG capsule; Take 1 capsule (300 mg total) by mouth 3 (three) times daily.  Dispense: 90 capsule; Refill: 2    4. Cigarette nicotine dependence with nicotine-induced disorder  - Ambulatory referral/consult to Smoking Cessation Program; Future    Antonina Jaimes presents today reporting improvement in her mid back pain following recent kyphoplasty at T7 and T10.  I did take the time to review the patient's recent MRI and x-rays of the thoracic spine with she and her daughter today and discussed the importance of addressing her osteoporosis at this time as within the 6 week week time frame of her initial lumbar MRI and her thoracic MRI she suffered 2 additional fractures.  We also discussed her vulnerability for additional fracture given the fact that she has compression fractures from T4 all the way down to S1.  She was encouraged to reach out to her primary care provider regarding osteoporosis treatment as I am unaware of a an interaction between digoxin and Prolia.  I do think it is imperative she begin osteoporosis treatment at this time.  They verbalized understanding.  We also discuss the importance of smoking cessation.  We will refer her on for consultation with our smoking cessation program at this time.  Given her ongoing radicular symptoms we will begin gabapentin.  We did discuss proper weaning and titration of this medication as well as potential side effects.  We will refer her on for consultation regarding pain management and  medications at this time.  The patient states she is not interested in steroid injections.  I did state she would likely not be a candidate for injection therapy as she does have significant osteoporosis with compression fractures throughout the spine.  Additional refills of Norco and muscle relaxers will need to be obtained per her PCP while awaiting consultation with pain management.  She and her daughter verbalized understanding at this time.  We will plan to see the patient back in clinic in approximately 3 months to monitor her symptoms.  We did discuss potential red flag findings for which I would like her to report.  She verbalizes understanding at this time.    Follow up in about 3 months (around 6/17/2025) for PM follow-up.      E/M Level Based On Time:   15 minutes spent on reviewing chart, which includes interpreting lab results and diagnostic tests.   45 minutes spent with the patient performing a history and physical exam, counseling or educating the patient/caregiver, prescribing medications, ordering labwork/diagnostic tests, or placing referrals.   0 minutes spent collaborating plan of care with physician.   5 minutes spent documenting all relevant clinical informationin the electronic health record.     Total Time Spent: 65 minutes        CHET Srivastava    Disclaimer:  This note is prepared using voice recognition software and as such is likely to have errors despite attempts at proofreading. Please contact me for questions.

## 2025-03-05 ENCOUNTER — ANESTHESIA (OUTPATIENT)
Dept: INTERVENTIONAL RADIOLOGY/VASCULAR | Facility: HOSPITAL | Age: 65
End: 2025-03-05
Payer: MEDICARE

## 2025-03-05 ENCOUNTER — HOSPITAL ENCOUNTER (OUTPATIENT)
Dept: INTERVENTIONAL RADIOLOGY/VASCULAR | Facility: HOSPITAL | Age: 65
Discharge: HOME | End: 2025-03-05
Attending: NURSE PRACTITIONER
Payer: MEDICARE

## 2025-03-05 VITALS
TEMPERATURE: 97 F | HEIGHT: 63 IN | BODY MASS INDEX: 21.05 KG/M2 | SYSTOLIC BLOOD PRESSURE: 155 MMHG | WEIGHT: 118.81 LBS | RESPIRATION RATE: 18 BRPM | HEART RATE: 76 BPM | OXYGEN SATURATION: 98 % | DIASTOLIC BLOOD PRESSURE: 67 MMHG

## 2025-03-05 DIAGNOSIS — S22.060A CLOSED WEDGE COMPRESSION FRACTURE OF T7 VERTEBRA, INITIAL ENCOUNTER: ICD-10-CM

## 2025-03-05 DIAGNOSIS — S22.080A CLOSED WEDGE COMPRESSION FRACTURE OF T11 VERTEBRA, INITIAL ENCOUNTER: ICD-10-CM

## 2025-03-05 DIAGNOSIS — M80.08XA AGE-RELATED OSTEOPOROSIS WITH CURRENT PATHOLOGICAL FRACTURE, VERTEBRA(E), INITIAL ENCOUNTER FOR FRACTURE: ICD-10-CM

## 2025-03-05 DIAGNOSIS — S22.070A CLOSED WEDGE COMPRESSION FRACTURE OF T10 VERTEBRA, INITIAL ENCOUNTER: ICD-10-CM

## 2025-03-05 LAB
ALBUMIN SERPL-MCNC: 4 G/DL (ref 3.4–4.8)
ALBUMIN/GLOB SERPL: 1.3 RATIO (ref 1.1–2)
ALP SERPL-CCNC: 86 UNIT/L (ref 40–150)
ALT SERPL-CCNC: 10 UNIT/L (ref 0–55)
ANION GAP SERPL CALC-SCNC: 8 MEQ/L
AST SERPL-CCNC: 17 UNIT/L (ref 5–34)
BASOPHILS # BLD AUTO: 0.04 X10(3)/MCL
BASOPHILS NFR BLD AUTO: 0.5 %
BILIRUB SERPL-MCNC: 0.6 MG/DL
BUN SERPL-MCNC: 9.3 MG/DL (ref 9.8–20.1)
CALCIUM SERPL-MCNC: 9.4 MG/DL (ref 8.4–10.2)
CHLORIDE SERPL-SCNC: 102 MMOL/L (ref 98–107)
CO2 SERPL-SCNC: 27 MMOL/L (ref 23–31)
CREAT SERPL-MCNC: 0.63 MG/DL (ref 0.55–1.02)
CREAT/UREA NIT SERPL: 15
EOSINOPHIL # BLD AUTO: 0.14 X10(3)/MCL (ref 0–0.9)
EOSINOPHIL NFR BLD AUTO: 1.7 %
ERYTHROCYTE [DISTWIDTH] IN BLOOD BY AUTOMATED COUNT: 12 % (ref 11.5–17)
GFR SERPLBLD CREATININE-BSD FMLA CKD-EPI: >60 ML/MIN/1.73/M2
GLOBULIN SER-MCNC: 3.1 GM/DL (ref 2.4–3.5)
GLUCOSE SERPL-MCNC: 80 MG/DL (ref 82–115)
HCT VFR BLD AUTO: 38.7 % (ref 37–47)
HGB BLD-MCNC: 12.9 G/DL (ref 12–16)
IMM GRANULOCYTES # BLD AUTO: 0.02 X10(3)/MCL (ref 0–0.04)
IMM GRANULOCYTES NFR BLD AUTO: 0.2 %
INR PPP: 1
LYMPHOCYTES # BLD AUTO: 2.18 X10(3)/MCL (ref 0.6–4.6)
LYMPHOCYTES NFR BLD AUTO: 26.1 %
MCH RBC QN AUTO: 30.1 PG (ref 27–31)
MCHC RBC AUTO-ENTMCNC: 33.3 G/DL (ref 33–36)
MCV RBC AUTO: 90.2 FL (ref 80–94)
MONOCYTES # BLD AUTO: 0.5 X10(3)/MCL (ref 0.1–1.3)
MONOCYTES NFR BLD AUTO: 6 %
NEUTROPHILS # BLD AUTO: 5.47 X10(3)/MCL (ref 2.1–9.2)
NEUTROPHILS NFR BLD AUTO: 65.5 %
NRBC BLD AUTO-RTO: 0 %
PLATELET # BLD AUTO: 265 X10(3)/MCL (ref 130–400)
PMV BLD AUTO: 9.6 FL (ref 7.4–10.4)
POTASSIUM SERPL-SCNC: 3.7 MMOL/L (ref 3.5–5.1)
PROT SERPL-MCNC: 7.1 GM/DL (ref 5.8–7.6)
PROTHROMBIN TIME: 12.8 SECONDS (ref 12.5–14.5)
RBC # BLD AUTO: 4.29 X10(6)/MCL (ref 4.2–5.4)
SODIUM SERPL-SCNC: 137 MMOL/L (ref 136–145)
WBC # BLD AUTO: 8.35 X10(3)/MCL (ref 4.5–11.5)

## 2025-03-05 PROCEDURE — 25000003 PHARM REV CODE 250: Performed by: NURSE ANESTHETIST, CERTIFIED REGISTERED

## 2025-03-05 PROCEDURE — 63600175 PHARM REV CODE 636 W HCPCS: Performed by: NURSE ANESTHETIST, CERTIFIED REGISTERED

## 2025-03-05 PROCEDURE — 88311 DECALCIFY TISSUE: CPT

## 2025-03-05 PROCEDURE — 88305 TISSUE EXAM BY PATHOLOGIST: CPT | Performed by: RADIOLOGY

## 2025-03-05 PROCEDURE — 80053 COMPREHEN METABOLIC PANEL: CPT | Performed by: RADIOLOGY

## 2025-03-05 PROCEDURE — 85610 PROTHROMBIN TIME: CPT | Performed by: RADIOLOGY

## 2025-03-05 PROCEDURE — 85025 COMPLETE CBC W/AUTO DIFF WBC: CPT | Performed by: RADIOLOGY

## 2025-03-05 PROCEDURE — 63600175 PHARM REV CODE 636 W HCPCS: Mod: JZ,TB | Performed by: ANESTHESIOLOGY

## 2025-03-05 PROCEDURE — 22515 PERQ VERTEBRAL AUGMENTATION: CPT

## 2025-03-05 RX ORDER — HYDROMORPHONE HYDROCHLORIDE 2 MG/ML
0.5 INJECTION, SOLUTION INTRAMUSCULAR; INTRAVENOUS; SUBCUTANEOUS EVERY 5 MIN PRN
Status: DISCONTINUED | OUTPATIENT
Start: 2025-03-05 | End: 2025-03-06 | Stop reason: HOSPADM

## 2025-03-05 RX ORDER — DEXAMETHASONE SODIUM PHOSPHATE 4 MG/ML
INJECTION, SOLUTION INTRA-ARTICULAR; INTRALESIONAL; INTRAMUSCULAR; INTRAVENOUS; SOFT TISSUE
Status: DISCONTINUED | OUTPATIENT
Start: 2025-03-05 | End: 2025-03-05

## 2025-03-05 RX ORDER — ROCURONIUM BROMIDE 10 MG/ML
INJECTION, SOLUTION INTRAVENOUS
Status: DISCONTINUED | OUTPATIENT
Start: 2025-03-05 | End: 2025-03-05

## 2025-03-05 RX ORDER — ONDANSETRON HYDROCHLORIDE 2 MG/ML
4 INJECTION, SOLUTION INTRAVENOUS ONCE
Status: DISCONTINUED | OUTPATIENT
Start: 2025-03-05 | End: 2025-03-06 | Stop reason: HOSPADM

## 2025-03-05 RX ORDER — GLUCAGON 1 MG
1 KIT INJECTION
Status: DISCONTINUED | OUTPATIENT
Start: 2025-03-05 | End: 2025-03-06 | Stop reason: HOSPADM

## 2025-03-05 RX ORDER — PROPOFOL 10 MG/ML
VIAL (ML) INTRAVENOUS
Status: DISCONTINUED | OUTPATIENT
Start: 2025-03-05 | End: 2025-03-05

## 2025-03-05 RX ORDER — PHENYLEPHRINE HYDROCHLORIDE 10 MG/ML
INJECTION INTRAVENOUS
Status: DISCONTINUED | OUTPATIENT
Start: 2025-03-05 | End: 2025-03-05

## 2025-03-05 RX ORDER — FENTANYL CITRATE 50 UG/ML
INJECTION, SOLUTION INTRAMUSCULAR; INTRAVENOUS
Status: DISCONTINUED | OUTPATIENT
Start: 2025-03-05 | End: 2025-03-05

## 2025-03-05 RX ORDER — ALBUTEROL SULFATE 90 UG/1
2 INHALANT RESPIRATORY (INHALATION) EVERY 6 HOURS PRN
COMMUNITY

## 2025-03-05 RX ORDER — ONDANSETRON 4 MG/1
4 TABLET, ORALLY DISINTEGRATING ORAL EVERY 8 HOURS PRN
COMMUNITY
Start: 2025-02-06

## 2025-03-05 RX ORDER — CEFAZOLIN SODIUM 1 G/3ML
INJECTION, POWDER, FOR SOLUTION INTRAMUSCULAR; INTRAVENOUS
Status: DISCONTINUED | OUTPATIENT
Start: 2025-03-05 | End: 2025-03-05

## 2025-03-05 RX ORDER — DIPHENHYDRAMINE HYDROCHLORIDE 50 MG/ML
25 INJECTION INTRAMUSCULAR; INTRAVENOUS EVERY 6 HOURS PRN
Status: DISCONTINUED | OUTPATIENT
Start: 2025-03-05 | End: 2025-03-06 | Stop reason: HOSPADM

## 2025-03-05 RX ORDER — HYDROMORPHONE HYDROCHLORIDE 2 MG/ML
0.2 INJECTION, SOLUTION INTRAMUSCULAR; INTRAVENOUS; SUBCUTANEOUS EVERY 5 MIN PRN
Status: DISCONTINUED | OUTPATIENT
Start: 2025-03-05 | End: 2025-03-06 | Stop reason: HOSPADM

## 2025-03-05 RX ORDER — ASPIRIN 81 MG/1
81 TABLET ORAL DAILY
COMMUNITY

## 2025-03-05 RX ORDER — TRAMADOL HYDROCHLORIDE 50 MG/1
50 TABLET ORAL EVERY 6 HOURS PRN
COMMUNITY

## 2025-03-05 RX ORDER — ONDANSETRON HYDROCHLORIDE 2 MG/ML
INJECTION, SOLUTION INTRAVENOUS
Status: DISCONTINUED | OUTPATIENT
Start: 2025-03-05 | End: 2025-03-05

## 2025-03-05 RX ORDER — SUCCINYLCHOLINE CHLORIDE 20 MG/ML
INJECTION INTRAMUSCULAR; INTRAVENOUS
Status: DISCONTINUED | OUTPATIENT
Start: 2025-03-05 | End: 2025-03-05

## 2025-03-05 RX ORDER — ALBUTEROL SULFATE 0.83 MG/ML
2.5 SOLUTION RESPIRATORY (INHALATION) EVERY 4 HOURS PRN
COMMUNITY
Start: 2025-02-03

## 2025-03-05 RX ADMIN — PHENYLEPHRINE HYDROCHLORIDE 100 MCG: 10 INJECTION INTRAVENOUS at 01:03

## 2025-03-05 RX ADMIN — FENTANYL CITRATE 100 MCG: 50 INJECTION, SOLUTION INTRAMUSCULAR; INTRAVENOUS at 12:03

## 2025-03-05 RX ADMIN — DEXAMETHASONE SODIUM PHOSPHATE 4 MG: 4 INJECTION, SOLUTION INTRA-ARTICULAR; INTRALESIONAL; INTRAMUSCULAR; INTRAVENOUS; SOFT TISSUE at 01:03

## 2025-03-05 RX ADMIN — SODIUM CHLORIDE, SODIUM GLUCONATE, SODIUM ACETATE, POTASSIUM CHLORIDE AND MAGNESIUM CHLORIDE: 526; 502; 368; 37; 30 INJECTION, SOLUTION INTRAVENOUS at 12:03

## 2025-03-05 RX ADMIN — HYDROMORPHONE HYDROCHLORIDE 0.5 MG: 2 INJECTION, SOLUTION INTRAMUSCULAR; INTRAVENOUS; SUBCUTANEOUS at 02:03

## 2025-03-05 RX ADMIN — ROCURONIUM BROMIDE 5 MG: 10 SOLUTION INTRAVENOUS at 12:03

## 2025-03-05 RX ADMIN — PROPOFOL 140 MG: 10 INJECTION, EMULSION INTRAVENOUS at 12:03

## 2025-03-05 RX ADMIN — ONDANSETRON 8 MG: 2 INJECTION INTRAMUSCULAR; INTRAVENOUS at 01:03

## 2025-03-05 RX ADMIN — SUCCINYLCHOLINE CHLORIDE 140 MG: 20 INJECTION, SOLUTION INTRAMUSCULAR; INTRAVENOUS at 12:03

## 2025-03-05 RX ADMIN — CEFAZOLIN 1 G: 330 INJECTION, POWDER, FOR SOLUTION INTRAMUSCULAR; INTRAVENOUS at 01:03

## 2025-03-05 NOTE — ANESTHESIA POSTPROCEDURE EVALUATION
Anesthesia Post Evaluation    Patient: Antonina Jaimes    Procedure(s) Performed: * No procedures listed *    Final Anesthesia Type: general      Patient location during evaluation: PACU  Patient participation: Yes- Able to Participate  Level of consciousness: awake and alert  Post-procedure vital signs: reviewed and stable  Pain management: adequate  Airway patency: patent      Anesthetic complications: no      Cardiovascular status: hemodynamically stable  Respiratory status: unassisted  Hydration status: euvolemic  Follow-up not needed.              Vitals Value Taken Time   /67 03/05/25 15:01   Temp 36.3 °C (97.3 °F) 03/05/25 14:50   Pulse 72 03/05/25 15:16   Resp 16 03/05/25 14:52   SpO2 100 % 03/05/25 15:16   Vitals shown include unfiled device data.      Event Time   Out of Recovery 15:00:00         Pain/Priscila Score: Pain Rating Prior to Med Admin: 8 (3/5/2025  2:47 PM)  Priscila Score: 10 (3/5/2025  3:00 PM)

## 2025-03-05 NOTE — ANESTHESIA PROCEDURE NOTES
Intubation    Date/Time: 3/5/2025 12:56 PM    Performed by: Tenzin Guido CRNA  Authorized by: Consuelo Rocha MD    Intubation:     Induction:  Intravenous    Intubated:  Postinduction    Mask Ventilation:  Easy mask    Attempts:  1    Attempted By:  CRNA    Method of Intubation:  Direct    Blade:  Carter 2    Laryngeal View Grade: Grade I - full view of cords      Difficult Airway Encountered?: No      Complications:  None    Airway Device:  Oral endotracheal tube    Airway Device Size:  7.0    Style/Cuff Inflation:  Cuffed    Tube secured:  21    Secured at:  The lips    Placement Verified By:  Capnometry and Revisualization with laryngoscopy    Complicating Factors:  None    Findings Post-Intubation:  BS equal bilateral and atraumatic/condition of teeth unchanged

## 2025-03-05 NOTE — INTERVAL H&P NOTE
The patient has been examined and the H&P has been reviewed:    I concur with the findings and no changes have occurred since H&P was written. Antonina Jaimes is a 64 y.o. female with T7 & T10 fractures with delayed healing and uncontrolled pain who presents for T7 & T10 Kyphoplasty.           There are no hospital problems to display for this patient.

## 2025-03-05 NOTE — OP NOTE
Radiology Post-Procedure Note    Pre Op Diagnosis: T7 & T10 Fractures with Delayed Healing and Uncontrolled Pain    Post Op Diagnosis: Same    Secondary Diagnoses:   Problem List Items Addressed This Visit    None  Visit Diagnoses         Closed wedge compression fracture of T7 vertebra, initial encounter        Relevant Orders    IR Kyphoplasty Thoracic_Lumbar Ea Add      Closed wedge compression fracture of T10 vertebra, initial encounter        Relevant Orders    IR Kyphoplasty Thoracic_Lumbar Ea Add      Closed wedge compression fracture of T11 vertebra, initial encounter        Relevant Orders    IR Kyphoplasty Thoracic_Lumbar Ea Add      Age-related osteoporosis with current pathological fracture, vertebra(e), initial encounter for fracture        Relevant Orders    IR Kyphoplasty Thoracic_Lumbar Ea Add             Procedure: T7 & T10 Balloon Kyphoplasty    Procedure performed by: Roverto Argueta MD    Assistant: Krissy    Written Informed Consent Obtained: Yes    Specimen Removed: T10 Bone Tissue    Estimated Blood Loss: 10 cc    Condition: Stable    Outcome: The patient tolerated the procedure well and was without complications.    For further details please see the imaging report associated.    Disposition: Transfer back to inpatient unit.

## 2025-03-05 NOTE — ANESTHESIA PREPROCEDURE EVALUATION
03/05/2025  Antonina Jaimes is a 64 y.o., female.  Smoker  Multiple Thoracic fxs    Pre-op Assessment    I have reviewed the Patient Summary Reports.     I have reviewed the Nursing Notes. I have reviewed the NPO Status.   I have reviewed the Medications.     Review of Systems  Cardiovascular:     Hypertension       CHF                Congestive Heart Failure (CHF)                Hypertension         Pulmonary:   COPD         Chronic Obstructive Pulmonary Disease (COPD):                      Hepatic/GI:     GERD         Gerd              Physical Exam  General: Well nourished, Cooperative, Alert and Oriented    Airway:  Mallampati: II   Mouth Opening: Normal  TM Distance: Normal  Tongue: Normal  Neck ROM: Normal ROM    Dental:  Dentures        Anesthesia Plan  Type of Anesthesia, risks & benefits discussed:    Anesthesia Type: Gen ETT  Intra-op Monitoring Plan: Standard ASA Monitors  Post Op Pain Control Plan: multimodal analgesia  Induction:  IV  Airway Plan: Direct, Post-Induction  Informed Consent: Informed consent signed with the Patient and all parties understand the risks and agree with anesthesia plan.  All questions answered. Patient consented to blood products? Yes  ASA Score: 3  Day of Surgery Review of History & Physical: H&P Update referred to the surgeon/provider.  Anesthesia Plan Notes: Prone precautions    Ready For Surgery From Anesthesia Perspective.     .

## 2025-03-05 NOTE — TRANSFER OF CARE
"Anesthesia Transfer of Care Note    Patient: Antonina Jaimes    Procedure(s) Performed: * No procedures listed *    Patient location: PACU    Anesthesia Type: general    Transport from OR: Transported from OR on room air with adequate spontaneous ventilation    Post pain: adequate analgesia    Post assessment: no apparent anesthetic complications    Post vital signs: stable    Level of consciousness: sedated and responds to stimulation    Nausea/Vomiting: no nausea/vomiting    Complications: none    Transfer of care protocol was followed      Last vitals: Visit Vitals  /80 (BP Location: Right arm, Patient Position: Lying)   Pulse 76   Temp 36.5 °C (97.7 °F)   Resp 16   Ht 5' 3" (1.6 m)   Wt 53.9 kg (118 lb 13.3 oz)   SpO2 100%   BMI 21.05 kg/m²     "

## 2025-03-06 LAB — PSYCHE PATHOLOGY RESULT: NORMAL

## 2025-03-10 DIAGNOSIS — S22.080A CLOSED WEDGE COMPRESSION FRACTURE OF T11 VERTEBRA, INITIAL ENCOUNTER: ICD-10-CM

## 2025-03-10 RX ORDER — METHOCARBAMOL 750 MG/1
750-1500 TABLET, FILM COATED ORAL 4 TIMES DAILY PRN
Qty: 80 TABLET | Refills: 0 | Status: SHIPPED | OUTPATIENT
Start: 2025-03-10

## 2025-03-10 RX ORDER — METHOCARBAMOL 1000 MG/1
1000 TABLET, FILM COATED ORAL EVERY 4 HOURS PRN
Qty: 40 TABLET | Refills: 0 | Status: SHIPPED | OUTPATIENT
Start: 2025-03-10 | End: 2025-03-10

## 2025-03-10 RX ORDER — HYDROCODONE BITARTRATE AND ACETAMINOPHEN 5; 325 MG/1; MG/1
1 TABLET ORAL EVERY 8 HOURS PRN
Qty: 30 TABLET | Refills: 0 | Status: SHIPPED | OUTPATIENT
Start: 2025-03-10

## 2025-03-10 NOTE — TELEPHONE ENCOUNTER
Refills have been sent in.  I did increase the patient's Robaxin dosage to a 1000 mg at this time.  Please advise the patient additional refills will need to be obtained from her primary care provider.  Thank you

## 2025-03-10 NOTE — TELEPHONE ENCOUNTER
"I spoke with patient. She is s/p T7 & T10 Kyphoplasty with Dr. Argueta on 3/5/25. She is to follow up with you on 3/17/25.    She is c/o soreness, spasms around operative area radiating around to ribs. She feels as though this area is constantly tight. However, she does believe the kyphoplasty did help her "bone" pain, as that is no longer present. She was last given Norco 5, 1 po q8 hrs, #30, 5 day supply on 2/6/25 and is currently out. She has been taking the robaxin, but it is not helping the pain on it's own. She finds it difficult to stand straight due to the pain. She is requesting a refill of Norco, or something in addition that she can take with Robaxin. She is out of Robaxin as well. Is it ok to refill the Norco and Robaxin? I entered both meds in the same as before, in case you need to make changes.   "

## 2025-03-17 ENCOUNTER — OFFICE VISIT (OUTPATIENT)
Dept: NEUROSURGERY | Facility: CLINIC | Age: 65
End: 2025-03-17
Payer: MEDICARE

## 2025-03-17 VITALS
RESPIRATION RATE: 16 BRPM | HEART RATE: 92 BPM | WEIGHT: 117 LBS | HEIGHT: 63 IN | BODY MASS INDEX: 20.73 KG/M2 | DIASTOLIC BLOOD PRESSURE: 85 MMHG | SYSTOLIC BLOOD PRESSURE: 151 MMHG

## 2025-03-17 DIAGNOSIS — M54.14 THORACIC RADICULITIS: ICD-10-CM

## 2025-03-17 DIAGNOSIS — S22.000D THORACIC COMPRESSION FRACTURE, WITH ROUTINE HEALING, SUBSEQUENT ENCOUNTER: ICD-10-CM

## 2025-03-17 DIAGNOSIS — F17.219 CIGARETTE NICOTINE DEPENDENCE WITH NICOTINE-INDUCED DISORDER: ICD-10-CM

## 2025-03-17 DIAGNOSIS — M54.16 LUMBAR RADICULOPATHY: Primary | ICD-10-CM

## 2025-03-17 RX ORDER — GABAPENTIN 300 MG/1
300 CAPSULE ORAL 3 TIMES DAILY
Qty: 90 CAPSULE | Refills: 2 | Status: SHIPPED | OUTPATIENT
Start: 2025-03-17 | End: 2026-03-17

## 2025-03-19 ENCOUNTER — HOSPITAL ENCOUNTER (OUTPATIENT)
Dept: RADIOLOGY | Facility: HOSPITAL | Age: 65
Discharge: HOME | End: 2025-03-19
Attending: NURSE PRACTITIONER
Payer: MEDICARE

## 2025-03-19 DIAGNOSIS — S22.000D THORACIC COMPRESSION FRACTURE, WITH ROUTINE HEALING, SUBSEQUENT ENCOUNTER: ICD-10-CM

## 2025-03-19 PROCEDURE — 72070 X-RAY EXAM THORAC SPINE 2VWS: CPT | Mod: TC

## 2025-03-20 ENCOUNTER — RESULTS FOLLOW-UP (OUTPATIENT)
Dept: FAMILY MEDICINE | Facility: CLINIC | Age: 65
End: 2025-03-20
Payer: MEDICARE

## 2025-03-21 ENCOUNTER — OFFICE VISIT (OUTPATIENT)
Dept: FAMILY MEDICINE | Facility: CLINIC | Age: 65
End: 2025-03-21
Payer: MEDICARE

## 2025-03-21 VITALS
RESPIRATION RATE: 20 BRPM | HEART RATE: 110 BPM | TEMPERATURE: 97 F | OXYGEN SATURATION: 97 % | BODY MASS INDEX: 20.91 KG/M2 | SYSTOLIC BLOOD PRESSURE: 123 MMHG | DIASTOLIC BLOOD PRESSURE: 72 MMHG | HEIGHT: 63 IN | WEIGHT: 118 LBS

## 2025-03-21 DIAGNOSIS — T42.6X5A ADVERSE EFFECT OF GABAPENTIN, INITIAL ENCOUNTER: Primary | ICD-10-CM

## 2025-03-21 DIAGNOSIS — L50.9 URTICARIAL RASH: ICD-10-CM

## 2025-03-21 RX ORDER — CYPROHEPTADINE HYDROCHLORIDE 4 MG/1
4 TABLET ORAL 3 TIMES DAILY PRN
Qty: 30 TABLET | Refills: 0 | Status: SHIPPED | OUTPATIENT
Start: 2025-03-21 | End: 2025-03-31

## 2025-03-21 RX ORDER — CYPROHEPTADINE HYDROCHLORIDE 4 MG/1
4 TABLET ORAL 3 TIMES DAILY
Qty: 30 TABLET | Refills: 0 | Status: CANCELLED | OUTPATIENT
Start: 2025-03-21 | End: 2025-03-31

## 2025-03-21 NOTE — PROGRESS NOTES
Patient ID: [id number]     Chief Complaint: itching (All over body since 3/17/25/Appt with cardio EULA Nicole /Possible allergic reaction to gabapentin)      HPI:     Antonina Jaimes is a 64 year old female who presents today for itching. She is accompanied today by her daughter.     PRURITUS:  She reports persistent itching symptoms. She discontinued gabapentin after one dose due to worsening of symptoms. Benadryl has provided no relief. She recently started using topical cortisone cream for symptom management.    Past Medical History:   Diagnosis Date    CHF (congestive heart failure)     COPD (chronic obstructive pulmonary disease)     GERD (gastroesophageal reflux disease)     Hyperlipidemia     Hypertension     Hypokalemia     Insomnia     Nicotine dependence     Osteoporosis         Past Surgical History:   Procedure Laterality Date    ADENOIDECTOMY      COLON SURGERY  10/23    COLONOSCOPY N/A 08/08/2023    Procedure: COLONOSCOPY;  Surgeon: Mikhail Marcano MD;  Location: [facility];  Service: Endoscopy;  Laterality: N/A;  Scant ascending colon diverticular disease   sigmoid diverticular     COLONOSCOPY, WITH POLYPECTOMY USING HOT BIOPSY FORCEPS N/A 08/08/2023    Procedure: COLONOSCOPY, WITH POLYPECTOMY USING HOT BIOPSY FORCEPS;  Surgeon: Mikhail Marcano MD;  Location: [facility];  Service: Endoscopy;  Laterality: N/A;  B. Polyp at 10-15cm; no heat     COLONOSCOPY, WITH POLYPECTOMY USING SNARE N/A 08/08/2023    Procedure: COLONOSCOPY, WITH POLYPECTOMY USING SNARE;  Surgeon: Mikhail Marcano MD;  Location: [facility];  Service: Endoscopy;  Laterality: N/A;  A. appendicle polyp       EGD, WITH CLOSED BIOPSY  07/15/2023    Procedure: EGD, WITH CLOSED BIOPSY;  Surgeon: Tyree Hodges MD;  Location: [facility];  Service: Gastroenterology;;    EGD, WITH HEMORRHAGE CONTROL  07/15/2023    Procedure: EGD,WITH HEMORRHAGE CONTROL;  Surgeon: Tyree Hodges MD;  Location: [facility] OR;  Service: Gastroenterology;;   gold probe 7FR    ESOPHAGOGASTRODUODENOSCOPY N/A 07/15/2023    Procedure: EGD (ESOPHAGOGASTRODUODENOSCOPY);  Surgeon: Tyree Hodges MD;  Location: [facility];  Service: Gastroenterology;  Laterality: N/A;    FRACTURE SURGERY  01/22/2024    HEMORRHOID SURGERY      HIP REPLACEMENT ARTHROPLASTY Left 01/22/2024    Procedure: ARTHROPLASTY, HIP REPLACEMENT;  Surgeon: Oleg Diaz MD;  Location: [facility];  Service: Orthopedics;  Laterality: Left;    HYSTERECTOMY      JOINT REPLACEMENT  1/22/2024    hip replaxement    SMALL INTESTINE SURGERY  10/2023    TONSILLECTOMY          Social History     Socioeconomic History    Marital status: Single   Tobacco Use    Smoking status: Every Day     Current packs/day: 0.50     Average packs/day: 1 pack/day for 40.5 years (40.1 ttl pk-yrs)     Types: Cigarettes     Start date: 10/7/1984    Smokeless tobacco: Never   Substance and Sexual Activity    Alcohol use: Yes     Alcohol/week: 2.0 standard drinks of alcohol     Types: 2 Cans of beer per week    Drug use: Never    Sexual activity: Not Currently     Birth control/protection: Abstinence     Social Drivers of Health     Financial Resource Strain: Low Risk  (2/1/2024)    Overall Financial Resource Strain (CARDIA)     Difficulty of Paying Living Expenses: Not very hard   Food Insecurity: No Food Insecurity (2/1/2024)    Hunger Vital Sign     Worried About Running Out of Food in the Last Year: Never true     Ran Out of Food in the Last Year: Never true   Transportation Needs: No Transportation Needs (2/1/2024)    PRAPARE - Transportation     Lack of Transportation (Medical): No     Lack of Transportation (Non-Medical): No   Physical Activity: Sufficiently Active (2/1/2024)    Exercise Vital Sign     Days of Exercise per Week: 7 days     Minutes of Exercise per Session: 90 min   Stress: No Stress Concern Present (2/1/2024)    [employer] [place] [employer] - Occupational Stress Questionnaire     Feeling of Stress : Only a  "little   Housing Stability: Low Risk  (2/1/2024)    Housing Stability Vital Sign     Unable to Pay for Housing in the Last Year: No     Number of Places Lived in the Last Year: 1     Unstable Housing in the Last Year: No        Current Outpatient Medications   Medication Instructions    albuterol (PROVENTIL) 2.5 mg, Every 4 hours PRN    albuterol (VENTOLIN HFA) 90 mcg/actuation inhaler 2 puffs, Every 6 hours PRN    aspirin (ECOTRIN) 81 mg, Daily    cyproheptadine (PERIACTIN) 4 mg, Oral, 3 times daily PRN    digoxin (LANOXIN) 0.125 mg, Oral, Daily    enalapril (VASOTEC) 2.5 mg, Oral, 2 times daily    gabapentin (NEURONTIN) 300 mg, Oral, 3 times daily    HYDROcodone-acetaminophen (NORCO) 5-325 mg per tablet 1 tablet, Oral, Every 8 hours PRN    methocarbamoL (ROBAXIN) 750-1,500 mg, Oral, 4 times daily PRN    ondansetron (ZOFRAN-ODT) 4 mg, Every 8 hours PRN    simvastatin (ZOCOR) 20 MG tablet TAKE 1 TABLET BY MOUTH EVERYDAY AT BEDTIME       Review of patient's allergies indicates:  No Known Allergies     Patient Care Team:  Zachery Ling DO as PCP - General (Family Medicine)  Shayan Rivera FNP as Nurse Practitioner (Cardiology)     Subjective:     Review of Systems    12 point review of systems conducted, negative except as stated in the history of present illness. See HPI for details.    Objective:     Visit Vitals  /72 (BP Location: Left arm, Patient Position: Sitting)   Pulse 110   Temp 97.4 °F (36.3 °C)   Resp 20   Ht 5' 3" (1.6 m)   Wt 53.5 kg (118 lb)   SpO2 97%   BMI 20.90 kg/m²       Physical Exam  Vitals and nursing note reviewed.   Constitutional:       General: She is not in acute distress.     Appearance: She is not ill-appearing.   HENT:      Head: Normocephalic and atraumatic.      Mouth/Throat:      Mouth: Mucous membranes are moist.      Pharynx: Oropharynx is clear.   Eyes:      General: No scleral icterus.     Extraocular Movements: Extraocular movements intact.      Conjunctiva/sclera: " Conjunctivae normal.      Pupils: Pupils are equal, round, and reactive to light.   Neck:      Vascular: No carotid bruit.   Cardiovascular:      Rate and Rhythm: Normal rate and regular rhythm.      Heart sounds: No murmur heard.     No friction rub. No gallop.   Pulmonary:      Effort: Pulmonary effort is normal. No respiratory distress.      Breath sounds: Normal breath sounds. No wheezing, rhonchi or rales.   Abdominal:      General: Abdomen is flat. Bowel sounds are normal. There is no distension.      Palpations: Abdomen is soft. There is no mass.      Tenderness: There is no abdominal tenderness.   Musculoskeletal:         General: Normal range of motion.      Cervical back: Normal range of motion and neck supple.   Skin:     General: Skin is warm and dry.      Findings: Rash present. Rash is urticarial.   Neurological:      General: No focal deficit present.      Mental Status: She is alert.   Psychiatric:         Mood and Affect: Mood normal.       Labs Reviewed:     Chemistry:  Lab Results   Component Value Date     03/05/2025    K 3.7 03/05/2025    BUN 9.3 (L) 03/05/2025    CREATININE 0.63 03/05/2025    EGFRNORACEVR >60 03/05/2025    GLUCOSE 80 (L) 03/05/2025    CALCIUM 9.4 03/05/2025    ALKPHOS 86 03/05/2025    LABPROT 7.1 03/05/2025    LABPROT 12.8 03/05/2025    ALBUMIN 4.0 03/05/2025    BILIDIR 0.2 12/19/2019    IBILI 0.20 12/19/2019    AST 17 03/05/2025    ALT 10 03/05/2025    MG 1.80 01/22/2024    TSH 1.938 11/07/2019      Hematology:  Lab Results   Component Value Date    WBC 8.35 03/05/2025    HGB 12.9 03/05/2025    HCT 38.7 03/05/2025     03/05/2025       Lipid Panel:  Lab Results   Component Value Date    CHOL 166 06/11/2024    HDL 79 (H) 06/11/2024    LDL 75.00 06/11/2024    TRIG 59 06/11/2024    TOTALCHOLEST 2 06/11/2024        Urine:  Lab Results   Component Value Date    APPEARANCEUA Clear 01/21/2024    SGUA 1.010 01/21/2024    PROTEINUA Negative 01/21/2024    KETONESUA Negative  01/21/2024    LEUKOCYTESUR Negative 01/21/2024    RBCUA 0-2 01/21/2024    WBCUA 0-2 01/21/2024    BACTERIA None Seen 01/21/2024        Assessment:       ICD-10-CM ICD-9-CM   1. Adverse effect of gabapentin, initial encounter  T42.6X5A E936.3   2. Urticarial rash  L50.9 708.9        Plan:     1. Adverse effect of gabapentin, initial encounter  -     cyproheptadine (PERIACTIN) 4 mg tablet; Take 1 tablet (4 mg total) by mouth 3 (three) times daily as needed (itching).  Dispense: 30 tablet; Refill: 0  -     E-Consult to Dermatology    2. Urticarial rash  Assessment & Plan:  Suspect gabapentin as potential cause of itching.  Considered history of osteoporosis and congestive heart failure when evaluating treatment options.  Decided against steroid use due to risk of fractures (osteoporosis) and edema (heart failure).  Opted for antihistamine (periactin) as initial treatment for itching.  Explained rationale for avoiding steroids due to osteoporosis and heart failure risks    Advised the patient to wear long sleeves to protect skin.  Recommend using unscented detergents and soaps.  Suggested lukewarm oatmeal baths for itching relief.  Instructed the patient to avoid scratching.    Prescribed periactin 4 mg, up to 3 times daily for 10 days to manage itching.  Continued hydrocortisone cream application to areas that itch the most.    Noted that the patient reports ongoing itching and scratching, which started after taking gabapentin.  Observed that previous treatments including diphenhydramine and hydrocortisone cream have not been effective.  Considered the itching may be a reaction to gabapentin or other medications.  Recommend wearing cotton gloves to prevent scratching.    Orders:  -     cyproheptadine (PERIACTIN) 4 mg tablet; Take 1 tablet (4 mg total) by mouth 3 (three) times daily as needed (itching).  Dispense: 30 tablet; Refill: 0  -     E-Consult to Dermatology       FOLLOW-UP:  Initiated e-consult with dermatology  for additional management options given complex medical history.  Instructed the patient that our office will contact her when dermatology e-consult recommendations are received.       Follow up if symptoms worsen or fail to improve. In addition to their scheduled follow up, the patient has also been instructed to follow up on as needed basis.     This note was generated with the assistance of ambient listening technology. Verbal consent was obtained by the patient and accompanying visitor(s) for the recording of patient appointment to facilitate this note. I attest to having reviewed and edited the generated note for accuracy, though some syntax or spelling errors may persist. Please contact the author of this note for any clarification.      Sherine Spann, Adult-Gerontology NP

## 2025-03-21 NOTE — ASSESSMENT & PLAN NOTE
Suspect gabapentin as potential cause of itching.  Considered history of osteoporosis and congestive heart failure when evaluating treatment options.  Decided against steroid use due to risk of fractures (osteoporosis) and edema (heart failure).  Opted for antihistamine (periactin) as initial treatment for itching.  Explained rationale for avoiding steroids due to osteoporosis and heart failure risks    Advised the patient to wear long sleeves to protect skin.  Recommend using unscented detergents and soaps.  Suggested lukewarm oatmeal baths for itching relief.  Instructed the patient to avoid scratching.    Prescribed periactin 4 mg, up to 3 times daily for 10 days to manage itching.  Continued hydrocortisone cream application to areas that itch the most.    Noted that the patient reports ongoing itching and scratching, which started after taking gabapentin.  Observed that previous treatments including diphenhydramine and hydrocortisone cream have not been effective.  Considered the itching may be a reaction to gabapentin or other medications.  Recommend wearing cotton gloves to prevent scratching.

## 2025-03-25 ENCOUNTER — PATIENT MESSAGE (OUTPATIENT)
Dept: NEUROSURGERY | Facility: CLINIC | Age: 65
End: 2025-03-25
Payer: MEDICARE

## 2025-03-25 ENCOUNTER — OFFICE VISIT (OUTPATIENT)
Dept: FAMILY MEDICINE | Facility: CLINIC | Age: 65
End: 2025-03-25
Payer: MEDICARE

## 2025-03-25 VITALS
SYSTOLIC BLOOD PRESSURE: 152 MMHG | DIASTOLIC BLOOD PRESSURE: 82 MMHG | WEIGHT: 119.63 LBS | RESPIRATION RATE: 20 BRPM | OXYGEN SATURATION: 97 % | TEMPERATURE: 98 F | BODY MASS INDEX: 21.2 KG/M2 | HEART RATE: 87 BPM | HEIGHT: 63 IN

## 2025-03-25 DIAGNOSIS — L50.9 URTICARIAL RASH: ICD-10-CM

## 2025-03-25 DIAGNOSIS — T42.6X5D: ICD-10-CM

## 2025-03-25 DIAGNOSIS — M81.0 AGE-RELATED OSTEOPOROSIS WITHOUT CURRENT PATHOLOGICAL FRACTURE: ICD-10-CM

## 2025-03-25 DIAGNOSIS — Z86.79 HISTORY OF CHF (CONGESTIVE HEART FAILURE): ICD-10-CM

## 2025-03-25 DIAGNOSIS — L29.9 ITCHING: Primary | ICD-10-CM

## 2025-03-25 DIAGNOSIS — I10 HYPERTENSION, UNSPECIFIED TYPE: ICD-10-CM

## 2025-03-25 DIAGNOSIS — I34.0 NONRHEUMATIC MITRAL VALVE REGURGITATION: ICD-10-CM

## 2025-03-25 PROCEDURE — 1159F MED LIST DOCD IN RCRD: CPT | Mod: CPTII,,,

## 2025-03-25 PROCEDURE — 3288F FALL RISK ASSESSMENT DOCD: CPT | Mod: CPTII,,,

## 2025-03-25 PROCEDURE — 3079F DIAST BP 80-89 MM HG: CPT | Mod: CPTII,,,

## 2025-03-25 PROCEDURE — 1101F PT FALLS ASSESS-DOCD LE1/YR: CPT | Mod: CPTII,,,

## 2025-03-25 PROCEDURE — 1160F RVW MEDS BY RX/DR IN RCRD: CPT | Mod: CPTII,,,

## 2025-03-25 PROCEDURE — G2211 COMPLEX E/M VISIT ADD ON: HCPCS | Mod: ,,,

## 2025-03-25 PROCEDURE — 3077F SYST BP >= 140 MM HG: CPT | Mod: CPTII,,,

## 2025-03-25 PROCEDURE — 99214 OFFICE O/P EST MOD 30 MIN: CPT | Mod: ,,,

## 2025-03-25 PROCEDURE — 1125F AMNT PAIN NOTED PAIN PRSNT: CPT | Mod: CPTII,,,

## 2025-03-25 PROCEDURE — 4010F ACE/ARB THERAPY RXD/TAKEN: CPT | Mod: CPTII,,,

## 2025-03-25 PROCEDURE — 3008F BODY MASS INDEX DOCD: CPT | Mod: CPTII,,,

## 2025-03-25 RX ORDER — FAMOTIDINE 20 MG/1
20 TABLET, FILM COATED ORAL 2 TIMES DAILY
Qty: 60 TABLET | Refills: 11 | Status: SHIPPED | OUTPATIENT
Start: 2025-03-25 | End: 2026-03-25

## 2025-03-25 RX ORDER — HYDROCORTISONE 25 MG/G
CREAM TOPICAL 2 TIMES DAILY
Qty: 28 G | Refills: 2 | Status: SHIPPED | OUTPATIENT
Start: 2025-03-25

## 2025-03-25 RX ORDER — PSYLLIUM HUSK 0.4 G
1 CAPSULE ORAL 2 TIMES DAILY WITH MEALS
Qty: 60 TABLET | Refills: 11 | Status: SHIPPED | OUTPATIENT
Start: 2025-03-25 | End: 2026-03-25

## 2025-03-25 RX ORDER — CYPROHEPTADINE HYDROCHLORIDE 4 MG/1
4 TABLET ORAL 3 TIMES DAILY PRN
Qty: 30 TABLET | Refills: 0 | Status: SHIPPED | OUTPATIENT
Start: 2025-03-25 | End: 2025-04-04

## 2025-03-25 RX ORDER — ENALAPRIL MALEATE 2.5 MG/1
2.5 TABLET ORAL 2 TIMES DAILY
Qty: 60 TABLET | Refills: 3 | Status: SHIPPED | OUTPATIENT
Start: 2025-03-25

## 2025-03-25 NOTE — LETTER
AUTHORIZATION FOR RELEASE OF   CONFIDENTIAL INFORMATION    Dear EULA Rivera,    We are seeing Antonina Jaimes, date of birth 1960, in the clinic at [facility]. BEATRICE Phillips is the patient's provider. Antonina Jaimes has an outstanding lab/procedure at the time we reviewed her chart. In order to help keep her health information updated, she has authorized us to request the following medical record(s):        (  )  MAMMOGRAM                                      (  )  COLONOSCOPY      (  )  PAP SMEAR                                          (  )  OUTSIDE LAB RESULTS     (  )  DEXA SCAN                                          (  )  EYE EXAM            (  )  FOOT EXAM                                          (  )  ENTIRE RECORD     (  )  OUTSIDE IMMUNIZATIONS                 (  x)  ___office notes____________         Please fax records to [facility], Ka'Ryn Franklin, AGNP-C, [phone]     If you have any questions, please contact DEVAUGHN Coles at [phone]          Patient Name: Antonina Jaimes  : 1960  Patient Phone #: [phone]

## 2025-03-25 NOTE — ASSESSMENT & PLAN NOTE
Explained that famotidine, typically used for gastroesophageal reflux, can improve antihistamine effects when used in combination to relieve pruritus.  Clarified that cyproheptadine (Periactin) is an antihistamine, which is why other antihistamines were not recommended.  Instructed the patient to use lukewarm water and Aveeno soap or body wash for bathing.  Prescribed famotidine 20 mg, twice daily, to be taken with cyproheptadine.  Prescribed hydrocortisone cream, to be applied twice daily as needed.  Continued cyproheptadine 4 mg, up to 3 times daily.  Noted that the patient's pruritus is starting to improve with medication.  Observed that the patient's rashes are improving.  Acknowledged the patient's limited treatment options due to other health conditions.  Consulted with Dr. Ling to develop a comprehensive treatment plan.

## 2025-03-25 NOTE — ASSESSMENT & PLAN NOTE
Prescribed calcium and vitamin D3 supplements, to be taken twice daily.  Expressed concern about osteoporosis due to rapid bone fragility.  Planned to discuss starting osteoporosis treatment at the next appointment with Dr. Ling.

## 2025-03-25 NOTE — PROGRESS NOTES
Patient ID: [id number]     Chief Complaint: itching      HPI:     Antonina Jaimes is a 64 year old female who presents today for follow up of itching. She is accompanied today by her daughter.     PRURITUS:  She reports improvement in itching symptoms with periactin, though initially did not notice a difference. She has not been fully compliant with the medication, missing three doses particularly when not eating. She uses hydrocortisone cream for topical relief.    CARDIOVASCULAR:  She has a history of heart failure with chronic shortness of breath. Her last heart catheterization was performed in July 2014.   ECHO from December 2024 showed EF of 60% with mild mitral regurgitation. Carotid ultrasound from December 2024 showed no significant stenosis.   She recently discontinued digoxin per medical recommendation.    Past Medical History:   Diagnosis Date    CHF (congestive heart failure)     COPD (chronic obstructive pulmonary disease)     GERD (gastroesophageal reflux disease)     Hyperlipidemia     Hypertension     Hypokalemia     Insomnia     Nicotine dependence     Osteoporosis         Past Surgical History:   Procedure Laterality Date    ADENOIDECTOMY      COLON SURGERY  10/23    COLONOSCOPY N/A 08/08/2023    Procedure: COLONOSCOPY;  Surgeon: Mikhail Marcano MD;  Location: [facility];  Service: Endoscopy;  Laterality: N/A;  Scant ascending colon diverticular disease   sigmoid diverticular     COLONOSCOPY, WITH POLYPECTOMY USING HOT BIOPSY FORCEPS N/A 08/08/2023    Procedure: COLONOSCOPY, WITH POLYPECTOMY USING HOT BIOPSY FORCEPS;  Surgeon: Mikhail Marcano MD;  Location: [facility];  Service: Endoscopy;  Laterality: N/A;  B. Polyp at 10-15cm; no heat     COLONOSCOPY, WITH POLYPECTOMY USING SNARE N/A 08/08/2023    Procedure: COLONOSCOPY, WITH POLYPECTOMY USING SNARE;  Surgeon: Mikhail Marcano MD;  Location: [facility];  Service: Endoscopy;  Laterality: N/A;  A. appendicle polyp       EGD, WITH  CLOSED BIOPSY  07/15/2023    Procedure: EGD, WITH CLOSED BIOPSY;  Surgeon: Tyree Hodges MD;  Location: [facility];  Service: Gastroenterology;;    EGD, WITH HEMORRHAGE CONTROL  07/15/2023    Procedure: EGD,WITH HEMORRHAGE CONTROL;  Surgeon: Tyree Hodges MD;  Location: [facility] OR;  Service: Gastroenterology;;  gold probe 7FR    ESOPHAGOGASTRODUODENOSCOPY N/A 07/15/2023    Procedure: EGD (ESOPHAGOGASTRODUODENOSCOPY);  Surgeon: Tyree Hodges MD;  Location: [facility] OR;  Service: Gastroenterology;  Laterality: N/A;    FRACTURE SURGERY  01/22/2024    HEMORRHOID SURGERY      HIP REPLACEMENT ARTHROPLASTY Left 01/22/2024    Procedure: ARTHROPLASTY, HIP REPLACEMENT;  Surgeon: Oleg Diaz MD;  Location: [facility];  Service: Orthopedics;  Laterality: Left;    HYSTERECTOMY      JOINT REPLACEMENT  1/22/2024    hip replaxement    SMALL INTESTINE SURGERY  10/2023    TONSILLECTOMY          Social History     Socioeconomic History    Marital status: Single   Tobacco Use    Smoking status: Every Day     Current packs/day: 0.50     Average packs/day: 1 pack/day for 40.5 years (40.1 ttl pk-yrs)     Types: Cigarettes     Start date: 10/7/1984    Smokeless tobacco: Never   Substance and Sexual Activity    Alcohol use: Yes     Alcohol/week: 2.0 standard drinks of alcohol     Types: 2 Cans of beer per week    Drug use: Never    Sexual activity: Not Currently     Birth control/protection: Abstinence     Social Drivers of Health     Financial Resource Strain: Low Risk  (2/1/2024)    Overall Financial Resource Strain (CARDIA)     Difficulty of Paying Living Expenses: Not very hard   Food Insecurity: No Food Insecurity (2/1/2024)    Hunger Vital Sign     Worried About Running Out of Food in the Last Year: Never true     Ran Out of Food in the Last Year: Never true   Transportation Needs: No Transportation Needs (2/1/2024)    PRAPARE - Transportation     Lack of Transportation (Medical): No     Lack of Transportation (Non-Medical): No    Physical Activity: Sufficiently Active (2/1/2024)    Exercise Vital Sign     Days of Exercise per Week: 7 days     Minutes of Exercise per Session: 90 min   Stress: No Stress Concern Present (2/1/2024)    Citizen of [place] [employer] - Occupational Stress Questionnaire     Feeling of Stress : Only a little   Housing Stability: Low Risk  (2/1/2024)    Housing Stability Vital Sign     Unable to Pay for Housing in the Last Year: No     Number of Places Lived in the Last Year: 1     Unstable Housing in the Last Year: No        Current Outpatient Medications   Medication Instructions    albuterol (PROVENTIL) 2.5 mg, Every 4 hours PRN    albuterol (VENTOLIN HFA) 90 mcg/actuation inhaler 2 puffs, Every 6 hours PRN    aspirin (ECOTRIN) 81 mg, Daily    calcium-vitamin D3 (OYSTER SHELL CALCIUM-VIT D3) 500 mg-5 mcg (200 unit) per tablet 1 tablet, Oral, 2 times daily with meals    cyproheptadine (PERIACTIN) 4 mg, Oral, 3 times daily PRN    enalapril (VASOTEC) 2.5 mg, Oral, 2 times daily    famotidine (PEPCID) 20 mg, Oral, 2 times daily    gabapentin (NEURONTIN) 300 mg, Oral, 3 times daily    HYDROcodone-acetaminophen (NORCO) 5-325 mg per tablet 1 tablet, Oral, Every 8 hours PRN    hydrocortisone 2.5 % cream Topical (Top), 2 times daily    methocarbamoL (ROBAXIN) 750-1,500 mg, Oral, 4 times daily PRN    ondansetron (ZOFRAN-ODT) 4 mg, Every 8 hours PRN    simvastatin (ZOCOR) 20 MG tablet TAKE 1 TABLET BY MOUTH EVERYDAY AT BEDTIME       Review of patient's allergies indicates:  No Known Allergies     Patient Care Team:  Zachery Ling DO as PCP - General (Family Medicine)  Shayan Rivera FNP as Nurse Practitioner (Cardiology)     Subjective:     Review of Systems    12 point review of systems conducted, negative except as stated in the history of present illness. See HPI for details.    Objective:     Visit Vitals  BP (!) 152/82 (BP Location: Left arm, Patient Position: Sitting)   Pulse 87   Temp 97.8 °F  "(36.6 °C)   Resp 20   Ht 5' 3" (1.6 m)   Wt 54.3 kg (119 lb 9.6 oz)   SpO2 97%   BMI 21.19 kg/m²       Physical Exam    General: No acute distress. Well-developed. Well-nourished.  Eyes: EOMI. Sclerae anicteric.  HENT: Normocephalic. Atraumatic. Nares patent. Moist oral mucosa.  Ears: Bilateral TMs clear. Bilateral EACs clear.  Cardiovascular: Regular rate. Regular rhythm. No murmurs. No rubs. No gallops. Normal S1, S2.  Respiratory: Normal respiratory effort. Clear to auscultation bilaterally. No rales. No rhonchi. No wheezing.  Abdomen: Soft. Non-tender. Non-distended. Normoactive bowel sounds.  Musculoskeletal: No  obvious deformity.  Extremities: No lower extremity edema.  Neurological: Alert & oriented x3. No slurred speech. Normal gait.  Psychiatric: Normal mood. Normal affect. Good insight. Good judgment.  Skin: Warm. Dry.  Urticarial rash.       Labs Reviewed:     Chemistry:  Lab Results   Component Value Date     03/05/2025    K 3.7 03/05/2025    BUN 9.3 (L) 03/05/2025    CREATININE 0.63 03/05/2025    EGFRNORACEVR >60 03/05/2025    GLUCOSE 80 (L) 03/05/2025    CALCIUM 9.4 03/05/2025    ALKPHOS 86 03/05/2025    LABPROT 7.1 03/05/2025    LABPROT 12.8 03/05/2025    ALBUMIN 4.0 03/05/2025    BILIDIR 0.2 12/19/2019    IBILI 0.20 12/19/2019    AST 17 03/05/2025    ALT 10 03/05/2025    MG 1.80 01/22/2024    TSH 1.938 11/07/2019      Hematology:  Lab Results   Component Value Date    WBC 8.35 03/05/2025    HGB 12.9 03/05/2025    HCT 38.7 03/05/2025     03/05/2025       Lipid Panel:  Lab Results   Component Value Date    CHOL 166 06/11/2024    HDL 79 (H) 06/11/2024    LDL 75.00 06/11/2024    TRIG 59 06/11/2024    TOTALCHOLEST 2 06/11/2024        Urine:  Lab Results   Component Value Date    APPEARANCEUA Clear 01/21/2024    SGUA 1.010 01/21/2024    PROTEINUA Negative 01/21/2024    KETONESUA Negative 01/21/2024    LEUKOCYTESUR Negative 01/21/2024    RBCUA 0-2 01/21/2024    WBCUA 0-2 01/21/2024    BACTERIA " None Seen 01/21/2024     Assessment:       ICD-10-CM ICD-9-CM   1. Itching  L29.9 698.9   2. Urticarial rash  L50.9 708.9   3. Adverse effect of gabapentin, subsequent encounter  T42.6X5D V58.89   4. Age-related osteoporosis without current pathological fracture  M81.0 733.01   5. History of CHF (congestive heart failure)  Z86.79 V12.59   6. Nonrheumatic mitral valve regurgitation  I34.0 424.0     Plan:     1. Itching  Assessment & Plan:  Explained that famotidine, typically used for gastroesophageal reflux, can improve antihistamine effects when used in combination to relieve pruritus.  Clarified that cyproheptadine (Periactin) is an antihistamine, which is why other antihistamines were not recommended.  Instructed the patient to use lukewarm water and Aveeno soap or body wash for bathing.  Prescribed famotidine 20 mg, twice daily, to be taken with cyproheptadine.  Prescribed hydrocortisone cream, to be applied twice daily as needed.  Continued cyproheptadine 4 mg, up to 3 times daily.  Noted that the patient's pruritus is starting to improve with medication.  Observed that the patient's rashes are improving.  Acknowledged the patient's limited treatment options due to other health conditions.  Consulted with Dr. Ling to develop a comprehensive treatment plan.    Orders:  -     famotidine (PEPCID) 20 MG tablet; Take 1 tablet (20 mg total) by mouth 2 (two) times daily.  Dispense: 60 tablet; Refill: 11  -     hydrocortisone 2.5 % cream; Apply topically 2 (two) times daily.  Dispense: 28 g; Refill: 2  -     cyproheptadine (PERIACTIN) 4 mg tablet; Take 1 tablet (4 mg total) by mouth 3 (three) times daily as needed (itching).  Dispense: 30 tablet; Refill: 0    2. Urticarial rash  -     cyproheptadine (PERIACTIN) 4 mg tablet; Take 1 tablet (4 mg total) by mouth 3 (three) times daily as needed (itching).  Dispense: 30 tablet; Refill: 0    3. Adverse effect of gabapentin, subsequent encounter  -     cyproheptadine  (PERIACTIN) 4 mg tablet; Take 1 tablet (4 mg total) by mouth 3 (three) times daily as needed (itching).  Dispense: 30 tablet; Refill: 0    4. Age-related osteoporosis without current pathological fracture  Assessment & Plan:  Prescribed calcium and vitamin D3 supplements, to be taken twice daily.  Expressed concern about osteoporosis due to rapid bone fragility.  Planned to discuss starting osteoporosis treatment at the next appointment with Dr. Ling.    Orders:  -     calcium-vitamin D3 (OYSTER SHELL CALCIUM-VIT D3) 500 mg-5 mcg (200 unit) per tablet; Take 1 tablet by mouth 2 (two) times daily with meals.  Dispense: 60 tablet; Refill: 11    5. History of CHF (congestive heart failure)  Assessment & Plan:  Digoxin has been discontinued by Cardiology (EULA Rivera NP).   Instructed the patient to follow up with cardiologist in 1 year.  Noted the patient's history of heart failure and chronic dyspnea.  Reviewed last heart catheterization from July 2014.  Evaluated ECHO from December 2024 showing EF of 60% with mild mitral regurgitation.  Assessed CUS from December 2024 showing no significant stenosis.  Advised the patient to stop smoking.      6. Nonrheumatic mitral valve regurgitation  Assessment & Plan:  Evaluated ECHO from December 2024 showing mild mitral regurgitation.        Follow up for Keep Scheduled Appointment. Print AVS. . In addition to their scheduled follow up, the patient has also been instructed to follow up on as needed basis.     This note was generated with the assistance of ambient listening technology. Verbal consent was obtained by the patient and accompanying visitor(s) for the recording of patient appointment to facilitate this note. I attest to having reviewed and edited the generated note for accuracy, though some syntax or spelling errors may persist. Please contact the author of this note for any clarification.      Sherine Spann, Adult-Gerontology NP

## 2025-03-25 NOTE — ASSESSMENT & PLAN NOTE
Digoxin has been discontinued by Cardiology (EULA Rivera, INAG).   Instructed the patient to follow up with cardiologist in 1 year.  Noted the patient's history of heart failure and chronic dyspnea.  Reviewed last heart catheterization from July 2014.  Evaluated ECHO from December 2024 showing EF of 60% with mild mitral regurgitation.  Assessed CUS from December 2024 showing no significant stenosis.  Advised the patient to stop smoking.

## 2025-03-26 ENCOUNTER — TELEPHONE (OUTPATIENT)
Dept: NEUROSURGERY | Facility: CLINIC | Age: 65
End: 2025-03-26
Payer: MEDICARE

## 2025-03-27 NOTE — TELEPHONE ENCOUNTER
Spoke with patient regarding possible allergic reaction to gabapentin. The patient reports taking gabapentin on the evening of 3/16/2025 then waking the next morning with itching and hives.  She presented to her primary care provider who advised she discontinue her gabapentin and prescribed antihistamines.  The patient states she has continued with itching since that time despite oral and topical antihistamines as well as topical steroids.  I advised the patient I am unsure if the gabapentin was the cause of this rash as she has continued with symptoms for more than a week and the gabapentin is likely out of her system at this time.  She was advised to continue on with recommendations per her primary care provider and we could potentially initiate Lyrica once the cause of her rash has been discovered and treated.  She verbalizes understanding at this time.  She also was advised to reach out to her primary care provider regarding Prolia injections.  She verbalizes understanding at this time.  She was advised to notify us with any questions or concerns prior to her follow up in June.

## 2025-03-29 ENCOUNTER — E-CONSULT (OUTPATIENT)
Dept: DERMATOLOGY | Facility: CLINIC | Age: 65
End: 2025-03-29
Payer: MEDICARE

## 2025-03-29 DIAGNOSIS — L30.9 DERMATITIS, UNSPECIFIED: Primary | ICD-10-CM

## 2025-03-29 PROCEDURE — 99499 UNLISTED E&M SERVICE: CPT | Mod: ,,, | Performed by: STUDENT IN AN ORGANIZED HEALTH CARE EDUCATION/TRAINING PROGRAM

## 2025-03-30 NOTE — CONSULTS
[facility]  Response for E-Consult     Patient Name: Antonina Jaimes  MRN: [id number]  Primary Care Provider: Zachery Ling DO   Requesting Provider: Sherine Spann NP  Consults    Unfortunately, this eConsult has been declined due to: Other Please attach photos and reconsult     Other:  This eConsult submission cannot be completed at this time due to no photos attached. Please add photos and reconsult.      Thank you for this eConsult referral.     Meredith Menendez MD  [facility]

## 2025-03-30 NOTE — CONSULTS
[facility]  Response for E-Consult     Patient Name: Antonina Jaimes  MRN: [id number]  Primary Care Provider: Zachery Ling DO   Requesting Provider: Sherine Spann NP  Consults    Unfortunately, this eConsult has been declined due to: Other    Other:  This eConsult submission cannot be completed at this time due to please attach photos.      Thank you for this eConsult referral.     Meredith Menendez MD  [facility]

## 2025-04-14 ENCOUNTER — OFFICE VISIT (OUTPATIENT)
Dept: ENDOCRINOLOGY | Facility: CLINIC | Age: 65
End: 2025-04-14
Payer: MEDICARE

## 2025-04-14 ENCOUNTER — LAB VISIT (OUTPATIENT)
Dept: LAB | Facility: HOSPITAL | Age: 65
End: 2025-04-14
Attending: NURSE PRACTITIONER
Payer: MEDICARE

## 2025-04-14 VITALS
SYSTOLIC BLOOD PRESSURE: 130 MMHG | BODY MASS INDEX: 21.26 KG/M2 | HEIGHT: 63 IN | TEMPERATURE: 98 F | RESPIRATION RATE: 12 BRPM | DIASTOLIC BLOOD PRESSURE: 85 MMHG | HEART RATE: 85 BPM | WEIGHT: 120 LBS

## 2025-04-14 DIAGNOSIS — E55.9 VITAMIN D DEFICIENCY: ICD-10-CM

## 2025-04-14 DIAGNOSIS — F17.210 CIGARETTE NICOTINE DEPENDENCE WITHOUT COMPLICATION: ICD-10-CM

## 2025-04-14 DIAGNOSIS — R30.0 DYSURIA: ICD-10-CM

## 2025-04-14 DIAGNOSIS — M80.00XA OSTEOPOROSIS WITH CURRENT PATHOLOGICAL FRACTURE, UNSPECIFIED OSTEOPOROSIS TYPE, INITIAL ENCOUNTER: ICD-10-CM

## 2025-04-14 DIAGNOSIS — M80.00XA OSTEOPOROSIS WITH CURRENT PATHOLOGICAL FRACTURE, UNSPECIFIED OSTEOPOROSIS TYPE, INITIAL ENCOUNTER: Primary | ICD-10-CM

## 2025-04-14 LAB
25(OH)D3+25(OH)D2 SERPL-MCNC: 9 NG/ML (ref 30–80)
BACTERIA #/AREA URNS AUTO: ABNORMAL /HPF
BILIRUB UR QL STRIP.AUTO: NEGATIVE
CLARITY UR: CLEAR
COLOR UR AUTO: COLORLESS
GLUCOSE UR QL STRIP: NORMAL
HGB UR QL STRIP: NEGATIVE
HYALINE CASTS #/AREA URNS LPF: ABNORMAL /LPF
KETONES UR QL STRIP: NEGATIVE
LEUKOCYTE ESTERASE UR QL STRIP: NEGATIVE
MUCOUS THREADS URNS QL MICRO: ABNORMAL /LPF
NITRITE UR QL STRIP: NEGATIVE
PH UR STRIP: 6.5 [PH]
PROT UR QL STRIP: NEGATIVE
RBC #/AREA URNS AUTO: ABNORMAL /HPF
SP GR UR STRIP.AUTO: 1 (ref 1–1.03)
SQUAMOUS #/AREA URNS LPF: ABNORMAL /HPF
UROBILINOGEN UR STRIP-ACNC: NORMAL
WBC #/AREA URNS AUTO: ABNORMAL /HPF

## 2025-04-14 PROCEDURE — 82306 VITAMIN D 25 HYDROXY: CPT

## 2025-04-14 PROCEDURE — 96372 THER/PROPH/DIAG INJ SC/IM: CPT | Mod: PBBFAC

## 2025-04-14 PROCEDURE — 36415 COLL VENOUS BLD VENIPUNCTURE: CPT

## 2025-04-14 PROCEDURE — 99215 OFFICE O/P EST HI 40 MIN: CPT | Mod: PBBFAC | Performed by: NURSE PRACTITIONER

## 2025-04-14 PROCEDURE — 81001 URINALYSIS AUTO W/SCOPE: CPT

## 2025-04-14 RX ADMIN — DENOSUMAB 60 MG: 60 INJECTION SUBCUTANEOUS at 11:04

## 2025-04-14 NOTE — PROGRESS NOTES
Patient Name: Antonina Jaimes   : 1960  MRN: [id number]     SUBJECTIVE DATA:    CHIEF COMPLAINT:   Antonina Jaimes is a 64 y.o. female who presents to clinic today with Osteoporosis        HPI 54-year-old female presents to the endocrine clinic accompanied by her daughter to establish care and to start Prolia shot.  Patient was referred by her PCP.  Patient's daughter report history of fractures.    DEXA bone scan results reviewed at bedside that was completed on 2025  EXAMINATION:  DEXA bone density DXA BONE DENSITY AXIAL SKELETON 1 OR MORE SITES     CLINICAL HISTORY:  , Encounter for screening for osteoporosis.     TECHNIQUE:  Dual energy x-ray absorptiometry was performed on the lumbar spine and femoral region to evaluate bone mineral density using GE lunar Prodigy.     COMPARISON:  2020     FINDINGS:  AP LUMBAR SPINE     BMD: 0.678 g/cm2  T-SCORE: 4.2 Z-SCORE: -2.6     Right hip     BMD: 0.499 g/cm2  T-SCORE: -4.0 Z-SCORE: -2.8     World Health Organization (WHO) criteria for post-menopausal,  women:     Normal:   T-score at or above -1 SD     Osteopenia:            T-score between -1 and -2.5 SD     Osteoporosis:  T-score at or below -2.5 SD     Impression:     1. Normal bone density of the lumbar spine.  2. Osteoporosis of the right hip  FOLLOW-UP: People with diagnosed cases of osteoporosis are at high risk for fracture should have regular bone mineral density tests. For patients eligible for Medicare, routine testing is allowed once for every two years. The testing frequency can be increased to one year for patients who have rapidly progressing disease, those who are receiving or discontinuing medical therapy to restore bone mass or have additional risk factors.        Electronically signed by:Zane Macias  Date:                                            2025  Time:                                           14:47    Labs: 2025 sodium 137, potassium 3.7,  "glucose 80, BUN 9.3, creatinine 0.63, calcium 9.4, albumin 4, eGFR >60  Patient currently not on any calcium supplementation.  Vitamin-D pending.  Stop smoking cigarettes.  Offered smoke cessation, patient declined at this time because she had decrease her cigarette consumption from 1 pack to 5 cigarettes a day.  Discussed with patient to decrease drinking.  You can drink 12 oz of beer once a day or half a cup of wine once a day or 1 oz of hard liquor once a day.  Patient usually drinks 4-6 packs a day.  Patient verbalized understanding  Patient denies the need for dental work.  Patient agreed to receive Prolia injection.  Pre-injection given in clinic.  Patient to return to clinic in 6 months for repeat.  Questions solicited and answered, patient verbalized understanding and agreed to plan of care    Addendum:  Vitamin-D 9 ng/ml.  Rx vitamin D 53071 units p.o. once every 7 days sent to preferred pharmacy, take with food and stay hydrated with water      Patient denies chest pain, shortness of breath, dyspnea on exertion, palpitations, peripheral edema, abdominal pain, nausea, vomiting, diarrhea, constipation, fatigue, fever, chills, dysuria,  hematuria, melena, or hematochezia.        ALLERGIES: Review of patient's allergies indicates:  No Known Allergies      ROS:  Review of Systems   All other systems reviewed and are negative.        OBJECTIVE DATA:  Vital signs  Vitals:    04/14/25 1102   BP: 130/85   BP Location: Left arm   Patient Position: Sitting   Pulse: 85   Resp: 12   Temp: 97.5 °F (36.4 °C)   TempSrc: Oral   Weight: 54.4 kg (120 lb)   Height: 5' 3" (1.6 m)      Body mass index is 21.26 kg/m².    PHYSICAL EXAM:   Physical Exam  Vitals and nursing note reviewed.   Constitutional:       General: She is awake. She is not in acute distress.     Appearance: Normal appearance. She is well-developed, well-groomed and normal weight. She is not ill-appearing, toxic-appearing or diaphoretic.   HENT:      Head: " Normocephalic and atraumatic.      Right Ear: External ear normal.      Left Ear: External ear normal.      Nose: Nose normal.      Mouth/Throat:      Mouth: Mucous membranes are moist.      Pharynx: Oropharynx is clear.   Eyes:      General: Lids are normal. Gaze aligned appropriately.      Extraocular Movements: Extraocular movements intact.      Pupils: Pupils are equal, round, and reactive to light.   Neck:      Thyroid: No thyroid mass, thyromegaly or thyroid tenderness.      Trachea: Trachea and phonation normal.   Cardiovascular:      Rate and Rhythm: Normal rate and regular rhythm.      Pulses: Normal pulses.           Radial pulses are 2+ on the right side and 2+ on the left side.      Heart sounds: Normal heart sounds. No murmur heard.  Pulmonary:      Effort: Pulmonary effort is normal.      Breath sounds: Normal breath sounds and air entry. No wheezing or rhonchi.   Abdominal:      Palpations: Abdomen is soft.   Musculoskeletal:         General: Normal range of motion.      Cervical back: Normal range of motion and neck supple.   Lymphadenopathy:      Cervical: No cervical adenopathy.   Skin:     General: Skin is warm and dry.      Capillary Refill: Capillary refill takes less than 2 seconds.   Neurological:      General: No focal deficit present.      Mental Status: She is alert and oriented to person, place, and time. Mental status is at baseline.      GCS: GCS eye subscore is 4. GCS verbal subscore is 5. GCS motor subscore is 6.      Cranial Nerves: No cranial nerve deficit.      Sensory: No sensory deficit.      Motor: No weakness.      Coordination: Coordination normal.      Gait: Gait abnormal (Utilizing a cane to ambulate).   Psychiatric:         Attention and Perception: Attention and perception normal.         Mood and Affect: Mood and affect normal.         Speech: Speech normal.         Behavior: Behavior normal. Behavior is cooperative.         Thought Content: Thought content normal.          Cognition and Memory: Cognition and memory normal.         Judgment: Judgment normal.          ASSESSMENT/PLAN:  1. Osteoporosis with current pathological fracture, unspecified osteoporosis type, initial encounter  Assessment & Plan:  03/05/2025 sodium 137, potassium 3.7, glucose 80, BUN 9.3, creatinine 0.63, calcium 9.4, albumin 4, eGFR >60  Patient currently not on any calcium supplementation.  Vitamin-D pending.  Stop smoking cigarettes.  Offered smoke cessation, patient declined at this time because she had decrease her cigarette consumption from 1 pack to 5 cigarettes a day.  Discussed with patient to decrease drinking.  You can drink 12 oz of beer once a day or half a cup of wine once a day or 1 oz of hard liquor once a day.  Patient usually drinks 4-6 packs a day.  Patient verbalized understanding  Patient denies the need for dental work.  Patient agreed to receive Prolia injection.  Pre-injection given in clinic.  Patient to return to clinic in 6 months for repeat.  Questions solicited and answered, patient verbalized understanding and agreed to plan of care    Orders:  -     Ambulatory referral/consult to Endocrinology  -     Discontinue: denosumab (PROLIA) injection 60 mg  -     Vitamin D; Future; Expected date: 04/14/2025  -     denosumab (PROLIA) injection 60 mg  -     ergocalciferol (VITAMIN D2) 50,000 unit Cap; Take 1 capsule (50,000 Units total) by mouth every 7 days.  Dispense: 12 capsule; Refill: 3    2. Vitamin D deficiency  Assessment & Plan:  Vitamin-D is  9 ng/ml.  Rx vitamin D 79367 units p.o. once every 7 days sent to preferred pharmacy, take with food and stay hydrated with water    Orders:  -     ergocalciferol (VITAMIN D2) 50,000 unit Cap; Take 1 capsule (50,000 Units total) by mouth every 7 days.  Dispense: 12 capsule; Refill: 3           RESULTS:  Recent Results (from the past 6 weeks)   Comprehensive Metabolic Panel    Collection Time: 03/05/25 10:25 AM   Result Value Ref Range    Sodium  137 136 - 145 mmol/L    Potassium 3.7 3.5 - 5.1 mmol/L    Chloride 102 98 - 107 mmol/L    CO2 27 23 - 31 mmol/L    Glucose 80 (L) 82 - 115 mg/dL    Blood Urea Nitrogen 9.3 (L) 9.8 - 20.1 mg/dL    Creatinine 0.63 0.55 - 1.02 mg/dL    Calcium 9.4 8.4 - 10.2 mg/dL    Protein Total 7.1 5.8 - 7.6 gm/dL    Albumin 4.0 3.4 - 4.8 g/dL    Globulin 3.1 2.4 - 3.5 gm/dL    Albumin/Globulin Ratio 1.3 1.1 - 2.0 ratio    Bilirubin Total 0.6 <=1.5 mg/dL    ALP 86 40 - 150 unit/L    ALT 10 0 - 55 unit/L    AST 17 5 - 34 unit/L    eGFR >60 mL/min/1.73/m2    Anion Gap 8.0 mEq/L    BUN/Creatinine Ratio 15    Protime-INR    Collection Time: 03/05/25 10:25 AM   Result Value Ref Range    PT 12.8 12.5 - 14.5 seconds    INR 1.0 <=1.3   CBC with Differential    Collection Time: 03/05/25 10:25 AM   Result Value Ref Range    WBC 8.35 4.50 - 11.50 x10(3)/mcL    RBC 4.29 4.20 - 5.40 x10(6)/mcL    Hgb 12.9 12.0 - 16.0 g/dL    Hct 38.7 37.0 - 47.0 %    MCV 90.2 80.0 - 94.0 fL    MCH 30.1 27.0 - 31.0 pg    MCHC 33.3 33.0 - 36.0 g/dL    RDW 12.0 11.5 - 17.0 %    Platelet 265 130 - 400 x10(3)/mcL    MPV 9.6 7.4 - 10.4 fL    Neut % 65.5 %    Lymph % 26.1 %    Mono % 6.0 %    Eos % 1.7 %    Basophil % 0.5 %    Imm Grans % 0.2 %    Neut # 5.47 2.1 - 9.2 x10(3)/mcL    Lymph # 2.18 0.6 - 4.6 x10(3)/mcL    Mono # 0.50 0.1 - 1.3 x10(3)/mcL    Eos # 0.14 0 - 0.9 x10(3)/mcL    Baso # 0.04 <=0.2 x10(3)/mcL    Imm Gran # 0.02 0.00 - 0.04 x10(3)/mcL    NRBC% 0.0 %   Specimen to Pathology Other    Collection Time: 03/05/25  1:29 PM   Result Value Ref Range    Pathology Result     Digoxin Level    Collection Time: 03/19/25  1:06 PM   Result Value Ref Range    Digoxin Level 0.3 (L) 0.8 - 2.0 ng/mL   Vitamin D    Collection Time: 04/14/25 12:09 PM   Result Value Ref Range    Vitamin D 9 (L) 30 - 80 ng/mL   Urinalysis, Reflex to Urine Culture    Collection Time: 04/14/25 12:16 PM    Specimen: Urine   Result Value Ref Range    Color, UA Colorless (A) Yellow,  Light-Yellow, Dark Yellow, Faustina, Straw    Appearance, UA Clear Clear    Specific Gravity, UA 1.005 1.005 - 1.030    pH, UA 6.5 5.0 - 8.5    Protein, UA Negative Negative    Glucose, UA Normal Negative, Normal    Ketones, UA Negative Negative    Blood, UA Negative Negative    Bilirubin, UA Negative Negative    Urobilinogen, UA Normal 0.2, 1.0, Normal    Nitrites, UA Negative Negative    Leukocyte Esterase, UA Negative Negative    RBC, UA 0-5 None Seen, 0-2, 3-5, 0-5 /HPF    WBC, UA 0-5 None Seen, 0-2, 3-5, 0-5 /HPF    Bacteria, UA None Seen None Seen /HPF    Squamous Epithelial Cells, UA None Seen None Seen /HPF    Mucous, UA Trace (A) None Seen /LPF    Hyaline Casts, UA None Seen None Seen /lpf         Follow Up:  Follow up in about 6 months (around 10/15/2025).      Previous medical history/lab work/radiology reviewed and considered during medical management decisions.   Medication list reviewed and medication reconciliation performed.  Patient was provided  and care about his/her current diagnosis (es) and medications including risk/benefit and side effects/adverse events, over the counter medication uses/doses, home self-care and contact precautions,  and red flags and indications for when to seek immediate medical attention.   Patient was advised to continue compliance with current medication list and medical recommendations.  Patient dvised continued compliance with recommended eating habits/ diets for medical conditions and exercise 150 minutes/ week (if possible) for medical condition (s).  Educational handouts and instructions on selected disease management in AVS (After Visit Summary).    All of the patient's questions were answered to patient's satisfaction.   The patient was receptive, expressed verbal understanding and agreement the above plan.        This note was created with the assistance of a voice recognition software or phone dictation. There may be transcription errors as a result of  using this technology however minimal. Effort has been made to assure accuracy of transcription but any obvious errors or omissions should be clarified with the author of the document

## 2025-04-14 NOTE — ASSESSMENT & PLAN NOTE
03/05/2025 sodium 137, potassium 3.7, glucose 80, BUN 9.3, creatinine 0.63, calcium 9.4, albumin 4, eGFR >60  Patient currently not on any calcium supplementation.  Vitamin-D pending.  Stop smoking cigarettes.  Offered smoke cessation, patient declined at this time because she had decrease her cigarette consumption from 1 pack to 5 cigarettes a day.  Discussed with patient to decrease drinking.  You can drink 12 oz of beer once a day or half a cup of wine once a day or 1 oz of hard liquor once a day.  Patient usually drinks 4-6 packs a day.  Patient verbalized understanding  Patient denies the need for dental work.  Patient agreed to receive Prolia injection.  Pre-injection given in clinic.  Patient to return to clinic in 6 months for repeat.  Questions solicited and answered, patient verbalized understanding and agreed to plan of care

## 2025-04-15 ENCOUNTER — RESULTS FOLLOW-UP (OUTPATIENT)
Dept: ENDOCRINOLOGY | Facility: CLINIC | Age: 65
End: 2025-04-15

## 2025-04-15 PROBLEM — E55.9 VITAMIN D DEFICIENCY: Status: ACTIVE | Noted: 2025-04-15

## 2025-04-15 PROBLEM — F17.210 CIGARETTE NICOTINE DEPENDENCE WITHOUT COMPLICATION: Status: ACTIVE | Noted: 2025-04-15

## 2025-04-15 RX ORDER — ERGOCALCIFEROL 1.25 MG/1
50000 CAPSULE ORAL
Qty: 12 CAPSULE | Refills: 3 | Status: SHIPPED | OUTPATIENT
Start: 2025-04-15

## 2025-04-15 NOTE — ASSESSMENT & PLAN NOTE
Smoking Cessation:  Smoker, assistance with smoking cessation was offered including: counseling and printed information on smoking cessation. The patient was counseled regarding smoking and smoking cessation for 3 minutes.   Patient declined smoke cessation at this time.  Patient to notify the clinic when ready to quit smoking.

## 2025-04-15 NOTE — PROGRESS NOTES
PLEASE CALL PATIENTS WITH RESULTS,   Vitamin-D very low.  Rx vitamin-D 63164 units p.o. once every 7 days, take with food and stay hydrated with water has been sent to preferred pharmacy.  Please update the clinic if you having issues getting medication.

## 2025-04-15 NOTE — ASSESSMENT & PLAN NOTE
Vitamin-D is  9 ng/ml.  Rx vitamin D 21798 units p.o. once every 7 days sent to preferred pharmacy, take with food and stay hydrated with water

## 2025-05-05 ENCOUNTER — HOSPITAL ENCOUNTER (OUTPATIENT)
Dept: RADIOLOGY | Facility: HOSPITAL | Age: 65
Discharge: HOME | End: 2025-05-05
Attending: FAMILY MEDICINE
Payer: MEDICARE

## 2025-05-05 DIAGNOSIS — R92.8 ABNORMAL MAMMOGRAM OF RIGHT BREAST: ICD-10-CM

## 2025-05-05 PROCEDURE — 77066 DX MAMMO INCL CAD BI: CPT | Mod: 26,,, | Performed by: RADIOLOGY

## 2025-05-05 PROCEDURE — 77066 DX MAMMO INCL CAD BI: CPT | Mod: TC

## 2025-05-05 PROCEDURE — 76642 ULTRASOUND BREAST LIMITED: CPT | Mod: 26,RT,, | Performed by: RADIOLOGY

## 2025-05-05 PROCEDURE — 76642 ULTRASOUND BREAST LIMITED: CPT | Mod: TC,RT

## 2025-05-05 PROCEDURE — 77062 BREAST TOMOSYNTHESIS BI: CPT | Mod: 26,,, | Performed by: RADIOLOGY

## 2025-05-15 DIAGNOSIS — E78.5 HYPERLIPIDEMIA, UNSPECIFIED HYPERLIPIDEMIA TYPE: ICD-10-CM

## 2025-05-15 DIAGNOSIS — Z11.4 ENCOUNTER FOR SCREENING FOR HIV: ICD-10-CM

## 2025-05-15 DIAGNOSIS — Z11.59 NEED FOR HEPATITIS C SCREENING TEST: ICD-10-CM

## 2025-05-15 DIAGNOSIS — I10 HYPERTENSION, UNSPECIFIED TYPE: ICD-10-CM

## 2025-05-15 DIAGNOSIS — Z00.00 WELLNESS EXAMINATION: Primary | ICD-10-CM

## 2025-05-27 ENCOUNTER — TELEPHONE (OUTPATIENT)
Dept: NEUROSURGERY | Facility: CLINIC | Age: 65
End: 2025-05-27
Payer: MEDICARE

## 2025-05-27 NOTE — TELEPHONE ENCOUNTER
Please send a referral to Dr. Jim Arvizu and postpone the patient's visit until she can see this provider.  Thank you

## 2025-05-27 NOTE — TELEPHONE ENCOUNTER
I spoke with the patient to see if she has been seeing Dr. Chavez for PM since this has been faxed since March. She has not heard anything. I did tell her that Dr. Chavez does not accept her insurance so that could be why. She is wanting to be referred to a PM doctor in [place]. Mary, I'm thinking we can try Dr. Jim Arvizu. Would you like to push her appointment back that is scheduled for 6/11/25 until she can see PM?

## 2025-05-28 NOTE — TELEPHONE ENCOUNTER
I spoke with the patient to let her know that I am faxing the referral to Dr. Jim Arvizu in [place]. She verbalized understanding. I told her I will cancel her upcoming appointment with Mary and will F/U with her in about a week to ensure she is scheduled. At that time, I will reschedule her appointment with Mary.

## 2025-07-01 ENCOUNTER — LAB VISIT (OUTPATIENT)
Dept: LAB | Facility: HOSPITAL | Age: 65
End: 2025-07-01
Attending: FAMILY MEDICINE
Payer: MEDICARE

## 2025-07-01 DIAGNOSIS — I10 HYPERTENSION, UNSPECIFIED TYPE: ICD-10-CM

## 2025-07-01 DIAGNOSIS — E78.5 HYPERLIPIDEMIA, UNSPECIFIED HYPERLIPIDEMIA TYPE: ICD-10-CM

## 2025-07-01 DIAGNOSIS — Z00.00 WELLNESS EXAMINATION: ICD-10-CM

## 2025-07-01 DIAGNOSIS — Z11.59 NEED FOR HEPATITIS C SCREENING TEST: ICD-10-CM

## 2025-07-01 LAB
ALBUMIN SERPL-MCNC: 4.3 G/DL (ref 3.4–4.8)
ALBUMIN/GLOB SERPL: 1.3 RATIO (ref 1.1–2)
ALP SERPL-CCNC: 50 UNIT/L (ref 40–150)
ALT SERPL-CCNC: 12 UNIT/L (ref 0–55)
ANION GAP SERPL CALC-SCNC: 7 MEQ/L
AST SERPL-CCNC: 19 UNIT/L (ref 11–45)
BASOPHILS # BLD AUTO: 0.04 X10(3)/MCL
BASOPHILS NFR BLD AUTO: 0.5 %
BILIRUB SERPL-MCNC: 0.6 MG/DL
BUN SERPL-MCNC: 6 MG/DL (ref 9.8–20.1)
CALCIUM SERPL-MCNC: 9.5 MG/DL (ref 8.4–10.2)
CHLORIDE SERPL-SCNC: 102 MMOL/L (ref 98–107)
CHOLEST SERPL-MCNC: 181 MG/DL
CHOLEST/HDLC SERPL: 2 {RATIO} (ref 0–5)
CO2 SERPL-SCNC: 31 MMOL/L (ref 23–31)
CREAT SERPL-MCNC: 0.65 MG/DL (ref 0.55–1.02)
CREAT/UREA NIT SERPL: 9
EOSINOPHIL # BLD AUTO: 0.27 X10(3)/MCL (ref 0–0.9)
EOSINOPHIL NFR BLD AUTO: 3.3 %
ERYTHROCYTE [DISTWIDTH] IN BLOOD BY AUTOMATED COUNT: 12.4 % (ref 11.5–17)
GFR SERPLBLD CREATININE-BSD FMLA CKD-EPI: >60 ML/MIN/1.73/M2
GLOBULIN SER-MCNC: 3.4 GM/DL (ref 2.4–3.5)
GLUCOSE SERPL-MCNC: 88 MG/DL (ref 82–115)
HCT VFR BLD AUTO: 42.4 % (ref 37–47)
HCV AB SERPL QL IA: NONREACTIVE
HDLC SERPL-MCNC: 82 MG/DL (ref 35–60)
HGB BLD-MCNC: 13.8 G/DL (ref 12–16)
IMM GRANULOCYTES # BLD AUTO: 0.01 X10(3)/MCL (ref 0–0.04)
IMM GRANULOCYTES NFR BLD AUTO: 0.1 %
LDLC SERPL CALC-MCNC: 85 MG/DL (ref 50–140)
LYMPHOCYTES # BLD AUTO: 3.1 X10(3)/MCL (ref 0.6–4.6)
LYMPHOCYTES NFR BLD AUTO: 37.7 %
MCH RBC QN AUTO: 29 PG (ref 27–31)
MCHC RBC AUTO-ENTMCNC: 32.5 G/DL (ref 33–36)
MCV RBC AUTO: 89.1 FL (ref 80–94)
MONOCYTES # BLD AUTO: 0.57 X10(3)/MCL (ref 0.1–1.3)
MONOCYTES NFR BLD AUTO: 6.9 %
NEUTROPHILS # BLD AUTO: 4.23 X10(3)/MCL (ref 2.1–9.2)
NEUTROPHILS NFR BLD AUTO: 51.5 %
NRBC BLD AUTO-RTO: 0 %
PLATELET # BLD AUTO: 292 X10(3)/MCL (ref 130–400)
PMV BLD AUTO: 9.8 FL (ref 7.4–10.4)
POTASSIUM SERPL-SCNC: 4 MMOL/L (ref 3.5–5.1)
PROT SERPL-MCNC: 7.7 GM/DL (ref 5.8–7.6)
RBC # BLD AUTO: 4.76 X10(6)/MCL (ref 4.2–5.4)
SODIUM SERPL-SCNC: 140 MMOL/L (ref 136–145)
TRIGL SERPL-MCNC: 72 MG/DL (ref 37–140)
VLDLC SERPL CALC-MCNC: 14 MG/DL
WBC # BLD AUTO: 8.22 X10(3)/MCL (ref 4.5–11.5)

## 2025-07-01 PROCEDURE — 36415 COLL VENOUS BLD VENIPUNCTURE: CPT

## 2025-07-01 PROCEDURE — 80061 LIPID PANEL: CPT

## 2025-07-01 PROCEDURE — 80053 COMPREHEN METABOLIC PANEL: CPT

## 2025-07-01 PROCEDURE — 86803 HEPATITIS C AB TEST: CPT

## 2025-07-01 PROCEDURE — 85025 COMPLETE CBC W/AUTO DIFF WBC: CPT

## 2025-07-02 ENCOUNTER — OFFICE VISIT (OUTPATIENT)
Dept: FAMILY MEDICINE | Facility: CLINIC | Age: 65
End: 2025-07-02
Payer: MEDICARE

## 2025-07-02 VITALS
WEIGHT: 122 LBS | RESPIRATION RATE: 18 BRPM | OXYGEN SATURATION: 97 % | DIASTOLIC BLOOD PRESSURE: 72 MMHG | HEIGHT: 63 IN | SYSTOLIC BLOOD PRESSURE: 132 MMHG | TEMPERATURE: 98 F | BODY MASS INDEX: 21.62 KG/M2 | HEART RATE: 97 BPM

## 2025-07-02 DIAGNOSIS — Z00.00 ENCOUNTER FOR PREVENTIVE HEALTH EXAMINATION: Primary | ICD-10-CM

## 2025-07-02 DIAGNOSIS — M54.16 LUMBAR RADICULOPATHY, CHRONIC: ICD-10-CM

## 2025-07-02 PROBLEM — I50.22 CHRONIC SYSTOLIC HEART FAILURE: Status: ACTIVE | Noted: 2024-11-25

## 2025-07-02 RX ORDER — PREGABALIN 50 MG/1
50 CAPSULE ORAL 3 TIMES DAILY
Qty: 90 CAPSULE | Refills: 5 | Status: SHIPPED | OUTPATIENT
Start: 2025-07-02 | End: 2025-12-29

## 2025-07-02 NOTE — PROGRESS NOTES
"   Internal Medicine    Antonina Jaimes is a 64 y.o. female here today for a Medicare Annual Wellness visit and comprehensive Health Risk Assessment. Reviewed labs with patient.     Subjective   The following components were reviewed and updated:  Medical history  Family History  Social history  Allergies  Current Medications  Immunizations  Health Maintenance  Patient Care Team    Review of Systems  A comprehensive review of systems was conducted and is negative except as noted above.     Objective   Visit Vitals  /72 (BP Location: Left arm, Patient Position: Sitting)   Pulse 97   Temp 98.4 °F (36.9 °C) (Temporal)   Resp 18   Ht 5' 3" (1.6 m)   Wt 55.3 kg (122 lb)   SpO2 97%   BMI 21.61 kg/m²        Physical Exam  Vitals reviewed.   Constitutional:       General: She is not in acute distress.     Appearance: Normal appearance.   Cardiovascular:      Rate and Rhythm: Normal rate and regular rhythm.      Heart sounds: No murmur heard.     No friction rub. No gallop.   Pulmonary:      Effort: No respiratory distress.      Breath sounds: No wheezing, rhonchi or rales.   Musculoskeletal:         General: No swelling, tenderness or deformity.      Right lower leg: No edema.      Left lower leg: No edema.   Skin:     General: Skin is warm and dry.      Findings: No lesion or rash.   Neurological:      General: No focal deficit present.      Mental Status: She is alert.   Psychiatric:         Mood and Affect: Mood normal.          Assessment/Plan:  1. Encounter for preventive health examination    2. Lumbar radiculopathy, chronic  -     pregabalin (LYRICA) 50 MG capsule; Take 1 capsule (50 mg total) by mouth 3 (three) times daily.  Dispense: 90 capsule; Refill: 5      A comprehensive HEALTH RISK ASSESSMENT was completed today. Results are summarized below:    The following EMOTIONAL/SOCIAL CONCERNS were identified on today's screening for Social Isolation, Depression and Anxiety:  *Patient feels UNABLE TO MANAGE " her HEALTH PROBLEMS. (Do you feel that you can control and manage most of your health problems?: (!) No)  <repeat PHQ-9>   There are NO COGNITIVE FUNCTION CONCERNS identified on today's screening.  The following FUNCTIONAL AND/OR SAFETY CONCERNS were identified on today's screening for Physical Symptoms, Nutritional, Home Safety/Living Situation, Fall Risk, Activities of Daily Living, Independent Activities of Daily Living, Physical Activity,Timed Up and Go test and Whisper test::  *Patient reports NO ROUTINE EXERCISE. (On average, how many days per week do you engage in moderate to strenuous exercise (like a brisk walk)?: (!) 0)  *Patient reports PHYSICAL ACTIVITY LIMITED BY PAIN/FATIGUE. (In the past four weeks have you your activities been limited by pain, tiredness or fatigue?: (!) Yes)  *Patient reports recently FEELING DIZZY, has a FEAR OF FALLING or HAS FALLEN. (In the past four weeks have you felt dizzy when standing up, were afraid of falling or fallen?: (!) Yes)  *Patient reports she NEEDS ASSISTANCE WITH SOME INDEPENDENT ACTIVITIES OF DAILY LIVING. (Do you need help with phone, transportation, shopping, preparing meals, housework, laundry, meds, or managing money?: (!) Yes)  *Patient reports she NEEDS AMBULATION ASSISTANCE. (Do you use an assistance device to easily get around?: (!) Yes)(Ambulation Assistance: Cane)     The patient reports NO OPIOID PRESCRIPTIONS. This was confirmed through medication reconciliation.    The patient is A CURRENT TOBACCO USER.  Tobacco Use: High Risk (7/2/2025)    Patient History     Smoking Tobacco Use: Every Day     Smokeless Tobacco Use: Never     Passive Exposure: Not on file     The patient reports NO SIGNIFICANT ALCOHOL USE.     All Questions regarding food, transportation or housing were not answered today.    I provided Antonina Jaimes with a 5-10 year written Screening Schedule per USPSTF age appropriate recommendations and a Personal Prevention Plan based on the  results of today's Health Risk Assessment. Education, counseling, and referrals were provided as documented above and can be viewed in the After Visit Summary.    Follow up in about 4 weeks (around 7/30/2025) for Follow up lumbar radiculopathy. In addition to this scheduled follow up, the patient has also been instructed to follow up on as needed basis.     none  The patient was asked and declined the use of a free .  Advance Care Planning   Today we discussed advance care planning. She is interested in learning more about how to make Advance Directives. Information was provided and I offered to discuss more at her discretion.

## 2025-07-02 NOTE — PATIENT INSTRUCTIONS
Medicare Annual Wellness Visit      Patient Name: Antonina Jaimes  Today's Date: 7/2/2025    Below you will find your 5-10 year Screening Plan Recommendations:  Health Maintenance       Date Due Completion Date    Shingles Vaccine (1 of 2) Never done ---    Pneumococcal Vaccines (Age 50+) (2 of 2 - PCV) 07/25/2015 7/25/2014    RSV Vaccine (Age 60+ and Pregnant patients) (1 - Risk 60-74 years 1-dose series) Never done ---    DEXA Scan 08/12/2025 2/12/2025    Influenza Vaccine (1) 09/01/2025 1/2/2025 (Declined)    Override on 1/2/2025: Declined    LDCT Lung Screen 02/07/2026 2/7/2025    Mammogram 05/05/2026 5/5/2025    Lipid Panel 07/01/2030 7/1/2025    TETANUS VACCINE 08/08/2030 8/8/2020    Colorectal Cancer Screening 11/24/2034 11/24/2024          Below is your summarized Personalized Prevention Plan that addresses any concerns we discussed today at your visit. Please see attached detailed information specific to your Health Concerns.  No orders of the defined types were placed in this encounter.        The following information is provided to all patients.  This information is to help you find additional resources for any problems that may be affecting your health: Living healthy guide: www.[facility].[place].gov      Understanding Diabetes: www.diabetes.org      Eating healthy: www.cdc.gov/healthyweight      CDC home safety checklist: www.cdc.gov/steadi/patient.html      Agency on Aging: www.goea.[place].gov      Alcoholics anonymous (AA): www.aa.org      Physical Activity: www.enmanuel.nih.gov/pu3kgpt      Tobacco use: www.quitwithusla.org

## 2025-07-10 DIAGNOSIS — I10 HYPERTENSION, UNSPECIFIED TYPE: ICD-10-CM

## 2025-07-10 RX ORDER — ENALAPRIL MALEATE 2.5 MG/1
2.5 TABLET ORAL 2 TIMES DAILY
Qty: 60 TABLET | Refills: 3 | Status: SHIPPED | OUTPATIENT
Start: 2025-07-10

## 2025-09-04 DIAGNOSIS — I10 HYPERTENSION, UNSPECIFIED TYPE: ICD-10-CM

## 2025-09-04 RX ORDER — ENALAPRIL MALEATE 2.5 MG/1
2.5 TABLET ORAL 2 TIMES DAILY
Qty: 180 TABLET | Refills: 2 | Status: SHIPPED | OUTPATIENT
Start: 2025-09-04

## (undated) DEVICE — COLLECTION SPECIMEN NEPTUNE

## (undated) DEVICE — PAD ELECTROSURGICAL SPL W/CORD

## (undated) DEVICE — SPONGE LAP 18X18 PREWASHED

## (undated) DEVICE — GLOVE SENSICARE PI GRN 7.5

## (undated) DEVICE — KIT SURGICAL COLON .25 1.1OZ

## (undated) DEVICE — FORCEP BX LG CAP 2.8MMX240CM

## (undated) DEVICE — TRAP SUCTION POLYP

## (undated) DEVICE — SUPPORT ULNA NERVE PROTECTOR

## (undated) DEVICE — CONTAINER SPECIMEN SCREW 4OZ

## (undated) DEVICE — SOL NACL IRR 3000ML

## (undated) DEVICE — WRAPON POLAR PAD HIP FOR POLAR

## (undated) DEVICE — KIT IV START WITH PREVANTICS

## (undated) DEVICE — PENCIL SMOKE EVAC ROCKER 70MM

## (undated) DEVICE — PROBE 7F GOLD

## (undated) DEVICE — WRAP COBAN NL STRL 4INX5YD

## (undated) DEVICE — SOL IRRI STRL WATER 1000ML

## (undated) DEVICE — BLANKET THERMOFLECT 4X7

## (undated) DEVICE — STAPLER SKIN ROTATING HEAD

## (undated) DEVICE — GLOVE PROTEXIS LTX 6.5

## (undated) DEVICE — SUT VCRL TP 1/2 CIR CT 40MM

## (undated) DEVICE — GOWN POLY REINF BRTH SLV XL

## (undated) DEVICE — PUMP COLD THERAPY

## (undated) DEVICE — GLOVE SENSICARE PI GRN 8.5

## (undated) DEVICE — TIP SUCTION YANKAUER

## (undated) DEVICE — FORCEP ALLIGATOR 2.8MM W/NDL

## (undated) DEVICE — BAG LABGUARD BIOHAZARD 6X9IN

## (undated) DEVICE — SUT VICRYL 2-0 J589H 27IN

## (undated) DEVICE — SOL IRR SOD CHL .9% POUR

## (undated) DEVICE — SET BASIN 48X48IN 6000ML RING

## (undated) DEVICE — GLOVE PROTEXIS HYDROGEL SZ6

## (undated) DEVICE — GLOVE SIGNATURE ESSNTL LTX 6.5

## (undated) DEVICE — CATH IV INTROCAN 22G X 1

## (undated) DEVICE — PACK TOTAL JOINT TJLSB 659 SH

## (undated) DEVICE — GLOVE SENSICARE PI GRN 7

## (undated) DEVICE — SYR 50CC LL

## (undated) DEVICE — KIT CANIST SUCTION 1200CC

## (undated) DEVICE — BLADE SAW LG BONE 1.47X25X90MM

## (undated) DEVICE — DRAPE HIP PCH 112X137X89IN

## (undated) DEVICE — BLANKET SNUGGLE WARM UPPER BDY

## (undated) DEVICE — GLOVE SIGNATURE ESSNTL LTX 8

## (undated) DEVICE — FORCEP CAPTURA PRO SPK 230CM

## (undated) DEVICE — DRAPE INCISE IOBAN 2 23X33IN

## (undated) DEVICE — GLOVE PROTEXIS BLUE LATEX 7

## (undated) DEVICE — DURAPREP SURG SCRUB 26ML

## (undated) DEVICE — TUBING O2 FEMALE CONN 13FT

## (undated) DEVICE — SOCKINETTE IMPERVIOS 16X48IN

## (undated) DEVICE — GLOVE PROTEXIS HYDROGEL SZ7.5

## (undated) DEVICE — GOWN ECLIPSE REINF LV4 TWL 2XL

## (undated) DEVICE — DRAPE STERI U-SHAPED 47X51IN

## (undated) DEVICE — Device

## (undated) DEVICE — SNARE ENDO POLYP 2.3MM 240CM

## (undated) DEVICE — KIT PULSAVAC PLUS HIP

## (undated) DEVICE — ELECTRODE EXTENDED BLADE

## (undated) DEVICE — GLOVE PROTEXIS BLUE LATEX 7.5

## (undated) DEVICE — DRESSING AQUACEL AG 3.5X10IN